# Patient Record
Sex: FEMALE | Race: WHITE | NOT HISPANIC OR LATINO | Employment: OTHER | URBAN - METROPOLITAN AREA
[De-identification: names, ages, dates, MRNs, and addresses within clinical notes are randomized per-mention and may not be internally consistent; named-entity substitution may affect disease eponyms.]

---

## 2017-06-15 ENCOUNTER — TRANSCRIBE ORDERS (OUTPATIENT)
Dept: LAB | Facility: CLINIC | Age: 62
End: 2017-06-15

## 2017-06-15 ENCOUNTER — APPOINTMENT (OUTPATIENT)
Dept: LAB | Facility: CLINIC | Age: 62
End: 2017-06-15
Payer: COMMERCIAL

## 2017-06-15 DIAGNOSIS — R73.03 PREDIABETES: ICD-10-CM

## 2017-06-15 DIAGNOSIS — E78.5 HYPERLIPIDEMIA: ICD-10-CM

## 2017-06-15 LAB
ALBUMIN SERPL BCP-MCNC: 3.8 G/DL (ref 3.5–5)
ALP SERPL-CCNC: 88 U/L (ref 46–116)
ALT SERPL W P-5'-P-CCNC: 22 U/L (ref 12–78)
ANION GAP SERPL CALCULATED.3IONS-SCNC: 4 MMOL/L (ref 4–13)
AST SERPL W P-5'-P-CCNC: 19 U/L (ref 5–45)
BILIRUB SERPL-MCNC: 0.49 MG/DL (ref 0.2–1)
BUN SERPL-MCNC: 11 MG/DL (ref 5–25)
CALCIUM SERPL-MCNC: 9.1 MG/DL (ref 8.3–10.1)
CHLORIDE SERPL-SCNC: 106 MMOL/L (ref 100–108)
CHOLEST SERPL-MCNC: 214 MG/DL (ref 50–200)
CO2 SERPL-SCNC: 27 MMOL/L (ref 21–32)
CREAT SERPL-MCNC: 0.66 MG/DL (ref 0.6–1.3)
EST. AVERAGE GLUCOSE BLD GHB EST-MCNC: 103 MG/DL
GFR SERPL CREATININE-BSD FRML MDRD: >60 ML/MIN/1.73SQ M
GLUCOSE P FAST SERPL-MCNC: 94 MG/DL (ref 65–99)
HBA1C MFR BLD: 5.2 % (ref 4.2–6.3)
HDLC SERPL-MCNC: 99 MG/DL (ref 40–60)
LDLC SERPL CALC-MCNC: 105 MG/DL (ref 0–100)
POTASSIUM SERPL-SCNC: 4.4 MMOL/L (ref 3.5–5.3)
PROT SERPL-MCNC: 6.7 G/DL (ref 6.4–8.2)
SODIUM SERPL-SCNC: 137 MMOL/L (ref 136–145)
TRIGL SERPL-MCNC: 50 MG/DL

## 2017-06-15 PROCEDURE — 80061 LIPID PANEL: CPT

## 2017-06-15 PROCEDURE — 83036 HEMOGLOBIN GLYCOSYLATED A1C: CPT

## 2017-06-15 PROCEDURE — 36415 COLL VENOUS BLD VENIPUNCTURE: CPT

## 2017-06-15 PROCEDURE — 80053 COMPREHEN METABOLIC PANEL: CPT

## 2017-06-21 ENCOUNTER — ALLSCRIPTS OFFICE VISIT (OUTPATIENT)
Dept: OTHER | Facility: OTHER | Age: 62
End: 2017-06-21

## 2017-12-01 DIAGNOSIS — I10 ESSENTIAL (PRIMARY) HYPERTENSION: ICD-10-CM

## 2017-12-01 DIAGNOSIS — Z11.59 ENCOUNTER FOR SCREENING FOR OTHER VIRAL DISEASES: ICD-10-CM

## 2017-12-01 DIAGNOSIS — E78.5 HYPERLIPIDEMIA: ICD-10-CM

## 2017-12-01 DIAGNOSIS — Z12.2 ENCOUNTER FOR SCREENING FOR MALIGNANT NEOPLASM OF RESPIRATORY ORGANS: ICD-10-CM

## 2017-12-01 DIAGNOSIS — R73.03 PREDIABETES: ICD-10-CM

## 2017-12-01 DIAGNOSIS — Z12.31 ENCOUNTER FOR SCREENING MAMMOGRAM FOR MALIGNANT NEOPLASM OF BREAST: ICD-10-CM

## 2017-12-11 ENCOUNTER — ALLSCRIPTS OFFICE VISIT (OUTPATIENT)
Dept: OTHER | Facility: OTHER | Age: 62
End: 2017-12-11

## 2017-12-12 NOTE — PROGRESS NOTES
Assessment    1  Dense breast tissue (793 82) (R92 2)   2  Atrophy of vagina (627 3) (N95 2)   3  Visit for screening mammogram (V76 12) (Z12 31)   4  Encounter for routine gynecological examination (V72 31) (Z01 419)    Plan  Encounter for routine gynecological examination    · Follow-up visit in 1 year Evaluation and Treatment  Follow-up  Status: Hold For -Scheduling  Requested for: 61TVJ4529   Ordered;Encounter for routine gynecological examination; Ordered By: Rosalino Kay Performed:  Due: 21IPY0927  Visit for screening mammogram    · MAMMO SCREENING BILATERAL W 3D & CAD; Status:Hold For - Scheduling; Requestedfor:84Knd3900;    Perform:[facility]; Due:66Ydk7040; Ordered;for screening mammogram; Ordered By:Elizabeth Welsh; Discussion/Summary    1  Yearly exam-Pap smear deferred, self breast awareness reviewed, calcium/vitamin D recommendations discussed, mammogram request given, colonoscopy followup as per specialist, done November 2016 with negative results with follow-up recommended in 5 years time  History of breast cysts/dense breast tissueâpatient had recent mammogram 10/31/16 with no focal findings but extremity dense breast tissue noted  She was counseled about the [facility]'s breast screening algorithm and she could consider automated breast ultrasound or 3-D mammogram  She agrees to 3D mammogram and request was givenHypertension-the patient is followed by Dr Dodie Barney for this issue  Her blood pressure was normal today  Slight vaginal atrophy-the patient does use lubrication without significant issues  she declines any vaginal estrogen or Osphena  She states that her partner also has ED with intermittent response to Viagra  She will call or return if interested in any treatment options  History ofDecreased libido/vulvar cysts-resolvedThe patient will followup in one year or as needed  The patient has the current Goals: Reviewed  The patent has the current Barriers: None     Patient is able to Self-Care  PATIENT EDUCATION RECORD She was given the following educational materials: Calcium/vitamin-D sheet   Chief Complaint    1  Visit For: Preventive General Multisystem Exam    History of Present Illness  HPI: Patient was seen today for yearly exam  Please see assessment plan for details  Review of Systems   Constitutional: No fever, no chills, feels well, no tiredness, no recent weight gain or loss  ENT: no ear ache, no loss of hearing, no nosebleeds or nasal discharge, no sore throat or hoarseness  Cardiovascular: no complaints of slow or fast heart rate, no chest pain, no palpitations, no leg claudication or lower extremity edema  Respiratory: no complaints of shortness of breath, no wheezing, no dyspnea on exertion, no orthopnea or PND  Breasts: no complaints of breast pain, breast lump or nipple discharge  Gastrointestinal: no complaints of abdominal pain, no constipation, no nausea or diarrhea, no vomiting, no bloody stools  Genitourinary: no complaints of dysuria, no incontinence, no pelvic pain, no dysmenorrhea, no vaginal discharge or abnormal vaginal bleeding  Musculoskeletal: no complaints of arthralgia, no myalgia, no joint swelling or stiffness, no limb pain or swelling  Integumentary: no complaints of skin rash or lesion, no itching or dry skin, no skin wounds  Neurological: no complaints of headache, no confusion, no numbness or tingling, no dizziness or fainting  ROS reviewed  Active Problems    1  Allergy to bee sting (V15 06) (Z91 038)   2  Benign essential hypertension (401 1) (I10)   3  Carotid artery plaque (433 10) (I65 29)   4  Cervical cancer screening (V76 2) (Z12 4)   5  Decreased libido (799 81) (R68 82)   6  Dense breast tissue (793 82) (R92 2)   7  Encounter for routine gynecological examination with Papanicolaou smear of cervix (V72 31,V76 2) (Z01 419)   8  Fibrocystic breast disease (610 1) (N60 19)   9  Gallstones (574 20) (K80 20)   10   History of self breast exam   11  Hyperlipidemia (272 4) (E78 5)   12  Need for hepatitis C screening test (V73 89) (Z11 59)   13  Screen for colon cancer (V76 51) (Z12 11)   14  Screening for HPV (human papillomavirus) (V73 81) (Z11 51)   15  Stiffness of shoulder joint, unspecified laterality (719 51) (M25 619)   16  Visit for screening mammogram (V76 12) (Z12 31)    Past Medical History     · History of Acute sinusitis (461 9) (J01 90)   · History of Encounter for screening for malignant neoplasm of cervix (V76 2) (Z12 4)   · History of  2 (V22 2) (Z33 1)   · History of abdominal pain (V13 89) (Y51 925)   · History of carotid artery stenosis (V12 59) (Z86 79)   · History of cyst of breast (V13 3) (Z87 2)   · History of dyspareunia (V13 29)   · History of self breast exam   · History of Left sided abdominal pain (789 09) (R10 9)   · History of Need for influenza vaccination (V04 81) (Z23)   · Personal history of endometriosis (V13 29) (Z87 42)   · History of Pre-diabetes (790 29) (R73 03)   · History of Right ankle pain (719 47) (M25 571)   · History of Upper respiratory infection (465 9) (J06 9)   · History of Visit for pre-operative examination (V72 84) (Q81 823)    The active problems and past medical history were reviewed and updated today  Surgical History     · History of Colposcopy Cervix With Biopsy(S) With Endocervical Curettage   · History of Complete Colonoscopy   · History of Endometrial Biopsy By Suction   · History of Exploratory Laparoscopy   · History of Oral Surgery   · History of Thyroglossal Duct Cyst Excision   · History of Tonsillectomy With Adenoidectomy   · History of Tubal Ligation    The surgical history was reviewed and updated today         Family History  Mother    · Family history of Benign Essential Hypertension  Father    · Family history of Benign Essential Hypertension   · Family history of Skin Cancer (V16 8)  Sister    · Family history of Spina Bifida  Brother    · Family history of Benign Essential Hypertension  Maternal Grandfather    · Family history of colon cancer (V16 0) (Z80 0)  Family History    · Family history of hypercholesterolemia (V18 19) (Z83 42)   · Family history of Hypertension (V17 49)    The family history was reviewed and updated today  Social History     · Always uses seat belt   · Being A Social Drinker   · Caffeine use (V49 89) (F15 90)   · Current Every Day Smoker (305 1)   ·    · No drug use   · [employer]   · Two children  The social history was reviewed and updated today  The social history was reviewed and is unchanged  Current Meds   1  EpiPen 2-Raymond 0 3 MG/0 3ML Injection Solution Auto-injector; INJECT 0 3ML INTRAMUSCULARLY AS DIRECTED; Therapy: 11RUQ3368 to (Last Miguel Diamond)  Requested for: 34Tla1216 Ordered   2  Lisinopril 20 MG Oral Tablet; TAKE 1 TABLET TWICE DAILY; Therapy: 13GMM3804 to (Evaluate:39Qzf8506)  Requested for: 11Xwa1692; Last Rx:29Jiq1122 Ordered   3  Simvastatin 20 MG Oral Tablet; take 1 tablet by mouth every day; Therapy: 36Spl2349 to (Evaluate:16Gew1881)  Requested for: 57VYT9012; Last Rx:72Lwh8081 Ordered    Allergies  1  Percocet TABS    Vitals   Recorded: 81EIE1664 29:21RU   Systolic 906, LUE, Sitting   Diastolic 80, LUE, Sitting   Height 5 ft 2 in   Weight 124 lb 4 oz   BMI Calculated 22 73   BSA Calculated 1 56   LMP POSTMENOPAUSAL       Physical Exam   Constitutional  General appearance: No acute distress, well appearing and well nourished  Neck  Neck: Normal, supple, trachea midline, no masses  Thyroid: Normal, no thyromegaly  Genitourinary  External genitalia: Normal and no lesions appreciated  Vagina: Normal, no lesions or dryness appreciated  -- Mildly atrophic, without erythema or lesions or discharge  Urethra: Normal    Urethral meatus: Normal    Bladder: Normal, soft, non-tender and no prolapse or masses appreciated  Cervix: Normal, no palpable masses   -- Mildly atrophic, without lesions or cervicitis  No Cervical motion tenderness  Uterus: Normal, non-tender, not enlarged, and no palpable masses  -- Mid position, normal size, nontender, without mass  Adnexa/parametria: Normal, non-tender and no fullness or masses appreciated  Anus, perineum, and rectum: Normal sphincter tone, no masses, and no prolapse  Chest  Breasts: Normal and no dimpling or skin changes noted  Abdomen  Abdomen: Normal, non-tender, and no organomegaly noted  Liver and spleen: No hepatomegaly or splenomegaly  Examination for hernias: No hernias appreciated  Lymphatic  Palpation of lymph nodes in neck, axillae, groin and/or other locations: No lymphadenopathy or masses noted  Skin  Skin and subcutaneous tissue: Normal skin turgor and no rashes     Palpation of skin and subcutaneous tissue: Normal    Psychiatric  Orientation to person, place, and time: Normal    Mood and affect: Normal        Future Appointments    Date/Time Provider Specialty Site   33/04/7838 18:50 AM Luana Lr DO Family Medicine [facility]       Signatures   Electronically signed by : CATRINA Veliz ; Dec 11 2017 10:24AM EST                       (Author)

## 2017-12-15 ENCOUNTER — APPOINTMENT (OUTPATIENT)
Dept: LAB | Facility: CLINIC | Age: 62
End: 2017-12-15
Payer: COMMERCIAL

## 2017-12-15 ENCOUNTER — TRANSCRIBE ORDERS (OUTPATIENT)
Dept: LAB | Facility: CLINIC | Age: 62
End: 2017-12-15

## 2017-12-15 DIAGNOSIS — Z11.59 ENCOUNTER FOR SCREENING FOR OTHER VIRAL DISEASES: ICD-10-CM

## 2017-12-15 DIAGNOSIS — I10 ESSENTIAL (PRIMARY) HYPERTENSION: ICD-10-CM

## 2017-12-15 DIAGNOSIS — E78.5 HYPERLIPIDEMIA: ICD-10-CM

## 2017-12-15 DIAGNOSIS — R73.03 PREDIABETES: ICD-10-CM

## 2017-12-15 LAB
25(OH)D3 SERPL-MCNC: 32.3 NG/ML (ref 30–100)
ALBUMIN SERPL BCP-MCNC: 4.3 G/DL (ref 3.5–5)
ALP SERPL-CCNC: 88 U/L (ref 46–116)
ALT SERPL W P-5'-P-CCNC: 25 U/L (ref 12–78)
ANION GAP SERPL CALCULATED.3IONS-SCNC: 10 MMOL/L (ref 4–13)
AST SERPL W P-5'-P-CCNC: 21 U/L (ref 5–45)
BILIRUB SERPL-MCNC: 0.5 MG/DL (ref 0.2–1)
BUN SERPL-MCNC: 12 MG/DL (ref 5–25)
CALCIUM SERPL-MCNC: 9.7 MG/DL (ref 8.3–10.1)
CHLORIDE SERPL-SCNC: 99 MMOL/L (ref 100–108)
CHOLEST SERPL-MCNC: 217 MG/DL (ref 50–200)
CO2 SERPL-SCNC: 23 MMOL/L (ref 21–32)
CREAT SERPL-MCNC: 0.72 MG/DL (ref 0.6–1.3)
GFR SERPL CREATININE-BSD FRML MDRD: 91 ML/MIN/1.73SQ M
GLUCOSE P FAST SERPL-MCNC: 97 MG/DL (ref 65–99)
HDLC SERPL-MCNC: 126 MG/DL (ref 40–60)
LDLC SERPL CALC-MCNC: 81 MG/DL (ref 0–100)
POTASSIUM SERPL-SCNC: 3.7 MMOL/L (ref 3.5–5.3)
PROT SERPL-MCNC: 7.6 G/DL (ref 6.4–8.2)
SODIUM SERPL-SCNC: 132 MMOL/L (ref 136–145)
TRIGL SERPL-MCNC: 49 MG/DL
TSH SERPL DL<=0.05 MIU/L-ACNC: 0.9 UIU/ML (ref 0.36–3.74)

## 2017-12-15 PROCEDURE — 86803 HEPATITIS C AB TEST: CPT

## 2017-12-15 PROCEDURE — 36415 COLL VENOUS BLD VENIPUNCTURE: CPT

## 2017-12-15 PROCEDURE — 80053 COMPREHEN METABOLIC PANEL: CPT

## 2017-12-15 PROCEDURE — 80061 LIPID PANEL: CPT

## 2017-12-15 PROCEDURE — 82306 VITAMIN D 25 HYDROXY: CPT

## 2017-12-15 PROCEDURE — 84443 ASSAY THYROID STIM HORMONE: CPT

## 2017-12-16 LAB — HCV AB SER QL: NORMAL

## 2017-12-19 ENCOUNTER — ALLSCRIPTS OFFICE VISIT (OUTPATIENT)
Dept: OTHER | Facility: OTHER | Age: 62
End: 2017-12-19

## 2017-12-20 NOTE — PROGRESS NOTES
Assessment  1  Benign essential hypertension (401 1) (I10)  2  Hyperlipidemia (272 4) (E78 5)  3  Carotid artery plaque (433 10) (I65 29)  4  Cigarette smoker (305 1) (F17 210)  5  Hyponatremia (276 1) (E87 1)    Plan  Benign essential hypertension, Carotid artery plaque, Hyperlipidemia    · (1) LIPID PANEL, FASTING; Status:Active - Retrospective By Protocol Authorization; Requested RENTERIA:[id number]; Benign essential hypertension, Hyperlipidemia    · (1) BASIC METABOLIC PROFILE; Status:Active - Retrospective By Protocol Authorization; Requested NUA:[id number];   SocHx: Cigarette smoker    · Start: BuPROPion HCl ER (XL) 150 MG Oral Tablet Extended Release 24 Hour; TAKE 1TABLET DAILY    Discussion/Summary    1  Hypertension-stable 2  Hyperlipidemia-LDL and HDL are excellent  3  Carotid artery plaquing-re check duplex December 2018 4  Hyponatremia-recheck BMP in 6 weeks  5  Tobacco cessation discussed  Patient will opt to try Wellbutrin  daily  She had previous side effects to Chantix  She will call in 2-3 weeks with an update and the dose could be raise that that time recheck otherwise 6 months with labs  Flu shot given today  Gyn up-to-date  Recommend she consider the [facility]'s study which involves low-dose CT the chest as a screening tool  The patient was counseled regarding diagnostic results,-- instructions for management,-- risk factor reductions,-- patient and family education,-- impressions,-- risks and benefits of treatment options,-- importance of compliance with treatment  total time of encounter was 25 minutes-- and-- Greater than 50% minutes was spent counseling  The treatment plan was reviewed with the patient/guardian  The patient/guardian understands and agrees with the treatment plan      Chief Complaint  F/U HTN and HL  ksd,cma      History of Present Illness  The patient states her hyperlipidemia has been under good control since the last visit   Comorbid Illnesses: carotid disease,-- tobacco use-- and-- hypertension  Symptoms: The patient is currently asymptomatic  The patient presents for follow-up of essential hypertension  The patient states she has been doing well with her blood pressure control since the last visit  Comorbid Illnesses: Carotid plaquing  Symptoms: The patient is currently asymptomatic  Six-month follow-up on blood pressure and labs  Overall doing well  Working roughly 7 days a week  Interested in trying to quit smoking again  Review of Systems   Constitutional: No fever, no chills, feels well, no tiredness, no recent weight gain or weight loss  Eyes: No complaints of eye pain, no red eyes, no eyesight problems, no discharge, no dry eyes, no itching of eyes  ENT: no complaints of earache, no loss of hearing, no nose bleeds, no nasal discharge, no sore throat, no hoarseness  Cardiovascular: No complaints of slow heart rate, no fast heart rate, no chest pain, no palpitations, no leg claudication, no lower extremity edema  Respiratory: No complaints of shortness of breath, no wheezing, no cough, no SOB on exertion, no orthopnea, no PND  Gastrointestinal: No complaints of abdominal pain, no constipation, no nausea or vomiting, no diarrhea, no bloody stools  Genitourinary: no dysuria  Musculoskeletal: No complaints of arthralgias, no myalgias, no joint swelling or stiffness, no limb pain or swelling  Integumentary: no rashes  Neurological: No complaints of headache, no confusion, no convulsions, no numbness, no dizziness or fainting, no tingling, no limb weakness, no difficulty walking  Psychiatric: as noted in HPI  Hematologic/Lymphatic: no tendency for easy bleeding  Active Problems  1  Allergy to bee sting (V15 06) (Z91 038)  2  Atrophy of vagina (627 3) (N95 2)  3  Benign essential hypertension (401 1) (I10)  4  Carotid artery plaque (433 10) (I65 29)  5  Cervical cancer screening (V76 2) (Z12 4)  6  Decreased libido (799 81) (R68 82)  7   Dense breast tissue (793 82) (R92 2)  8  Encounter for routine gynecological examination (V72 31) (Z01 419)  9  Fibrocystic breast disease (610 1) (N60 19)  10  Gallstones (574 20) (K80 20)  11  Hyperlipidemia (272 4) (E78 5)  12  Need for hepatitis C screening test (V73 89) (Z11 59)  13  Screen for colon cancer (V76 51) (Z12 11)  14  Screening for HPV (human papillomavirus) (V73 81) (Z11 51)  15  Visit for screening mammogram (V76 12) (Z12 31)    Past Medical History  1  History of Acute sinusitis (461 9) (J01 90)  2  History of Encounter for routine gynecological examination with Papanicolaou smear of cervix (V72 31,V76 2) (Z01 419)  3  History of Encounter for screening for malignant neoplasm of cervix (V76 2) (Z12 4)  4  History of  2 (V22 2) (Z33 1)  5  History of abdominal pain (V13 89) (Z87 898)  6  History of carotid artery stenosis (V12 59) (Z86 79)  7  History of cyst of breast (V13 3) (Z87 2)  8  History of dyspareunia (V13 29)  9  History of influenza vaccination (V49 89) (Z92 29)  10  History of self breast exam  11  History of self breast exam  12  History of Left sided abdominal pain (789 09) (R10 9)  13  History of Need for influenza vaccination (V04 81) (Z23)  14  Personal history of endometriosis (V13 29) (Z87 42)  15  History of Pre-diabetes (790 29) (R73 03)  16  History of Right ankle pain (719 47) (M25 571)  17  History of Stiffness of shoulder joint, unspecified laterality (719 51) (M25 619)  18  History of Upper respiratory infection (465 9) (J06 9)  19  History of Visit for pre-operative examination (V72 84) (W31 977)    Surgical History  1  History of Colposcopy Cervix With Biopsy(S) With Endocervical Curettage  2  History of Complete Colonoscopy  3  History of Endometrial Biopsy By Suction  4  History of Exploratory Laparoscopy  5  History of Oral Surgery  6  History of Thyroglossal Duct Cyst Excision  7  History of Tonsillectomy With Adenoidectomy  8   History of Tubal Ligation    Family History  Mother   1  Family history of Benign Essential Hypertension  Father   2  Family history of Benign Essential Hypertension  3  Family history of Skin Cancer (V16 8)  Sister   4  Family history of Spina Bifida  Brother   5  Family history of Benign Essential Hypertension  Maternal Grandfather   6  Family history of colon cancer (V16 0) (Z80 0)  Family History   7  Family history of hypercholesterolemia (V18 19) (Z83 42)  8  Family history of Hypertension (V17 49)    Social History   · Always uses seat belt   · Being A Social Drinker   · Caffeine use (V49 89) (F15 90)   · Cigarette smoker (305 1) (F17 210)   ·    · No drug use   · [employer]   · Two children    Current Meds  1  EpiPen 2-Raymond 0 3 MG/0 3ML Injection Solution Auto-injector; INJECT 0 3ML INTRAMUSCULARLY AS DIRECTED; Therapy: 59NZS5061 to (Last Kevon Alicia)  Requested for: 69Gvs2574 Ordered  2  Lisinopril 20 MG Oral Tablet; TAKE 1 TABLET TWICE DAILY; Therapy: 70TLU4748 to (Evaluate:69Jml7782)  Requested for: 58Ise5490; Last Rx:12Fcs9807 Ordered  3  Simvastatin 20 MG Oral Tablet; take 1 tablet by mouth every day; Therapy: 33Fqe7438 to (Evaluate:89Lty2890)  Requested for: 45DDY8321; Last Rx:61Cqo5980 Ordered    Allergies  1  Percocet TABS    Vitals  Vital Signs    Recorded: 51GIX0707 31:70IZ   Systolic 129   Diastolic 76   Height 5 ft 2 in   Weight 127 lb 2 oz   BMI Calculated 23 25   BSA Calculated 1 58     Physical Exam   Constitutional  General appearance: No acute distress, well appearing and well nourished  Eyes  Pupils and irises: Equal, round and reactive to light  Ears, Nose, Mouth, and Throat  Oropharynx: Normal with no erythema, edema, exudate or lesions  Pulmonary  Respiratory effort: No increased work of breathing or signs of respiratory distress  Auscultation of lungs: Clear to auscultation  Cardiovascular  Auscultation of heart: Normal rate and rhythm, normal S1 and S2, without murmurs     Examination of extremities for edema and/or varicosities: Normal    Carotid pulses: Normal    Abdomen  Abdomen: Non-tender, no masses  Lymphatic  Palpation of lymph nodes in neck: No lymphadenopathy  Musculoskeletal  Gait and station: Normal    Neurologic  Cranial nerves: Cranial nerves 2-12 intact  Psychiatric  Orientation to person, place, and time: Normal    Mood and affect: Normal          Health Management  Screen for colon cancer   COLONOSCOPY; every 5 years; Last 85UET3607; Next Due: 38JPO4141;  Active    Signatures   Electronically signed by : Serenity Clark DO; Dec 19 2730  8:44AM EST                       (Author)    Electronically signed by : Serenity Clark DO; Dec 19 0342  2:06PM EST                       (Author)    Electronically signed by : Serenity Clark DO; Dec 19 2175  2:06PM EST                       (Author)

## 2018-01-12 VITALS
DIASTOLIC BLOOD PRESSURE: 80 MMHG | HEIGHT: 63 IN | WEIGHT: 135.13 LBS | BODY MASS INDEX: 23.94 KG/M2 | SYSTOLIC BLOOD PRESSURE: 116 MMHG

## 2018-01-16 NOTE — PROGRESS NOTES
Chief Complaint  pt here today for the Zostavax vaccine  Active Problems    1  Allergy to bee sting (V15 06) (Z91 038)   2  Benign essential hypertension (401 1) (I10)   3  Decreased libido (799 81) (R68 82)   4  Fibrocystic breast disease (610 1) (N60 19)   5  Hyperlipidemia (272 4) (E78 5)   6  Need for influenza vaccination (V04 81) (Z23)   7  Need for pneumococcal vaccination (V03 82) (Z23)   8  Other screening mammogram (V76 12) (Z12 31)   9  Right ankle pain (719 47) (M25 571)   10  Screen for colon cancer (V76 51) (Z12 11)    Current Meds   1  EpiPen 2-Raymond 0 3 MG/0 3ML Injection Solution Auto-injector; INJECT 0 3ML   INTRAMUSCULARLY AS DIRECTED; Therapy: 85IOP9557 to (Last Reid Brand)  Requested for: 85Ubu1316 Ordered   2  Lisinopril 20 MG Oral Tablet; TAKE 1 TABLET TWICE DAILY; Therapy: 93PFS9000 to (Evaluate:99Tds0076)  Requested for: 27JOX7134; Last   Rx:93Icj0190 Ordered   3  Simvastatin 20 MG Oral Tablet; take 1 tablet by mouth every day; Therapy: 03Lii5051 to (Evaluate:18Nvg1711)  Requested for: 94LVK6871; Last   Rx:84Lhs7310 Ordered    Allergies    1   Percocet TABS    Plan  Need for shingles vaccine    · Zostavax 44993 UNT/0 65ML Subcutaneous Solution Reconstituted    Future Appointments    Date/Time Provider Specialty Site   37/13/1591 68:88 AM Pierre Nunez DO Family Medicine [facility]     Signatures   Electronically signed by : Estee Suh DO; Sep 30 3674 11:54AM EST                       (Author)

## 2018-01-17 NOTE — PROGRESS NOTES
Chief Complaint  pt here today for a flu vaccine  Active Problems    1  Abdominal pain (789 00) (R10 9)   2  Allergy to bee sting (V15 06) (Z91 038)   3  Benign essential hypertension (401 1) (I10)   4  Carotid artery plaque (433 10) (I65 29)   5  Cervical cancer screening (V76 2) (Z12 4)   6  Decreased libido (799 81) (R68 82)   7  Dense breast tissue (793 82) (R92 2)   8  Encounter for routine gynecological examination with Papanicolaou smear of cervix   (V72 31,V76 2) (Z01 419)   9  Fibrocystic breast disease (610 1) (N60 19)   10  Hyperlipidemia (272 4) (E78 5)   11  Left sided abdominal pain (789 09) (R10 9)   12  Pre-diabetes (790 29) (R73 03)   13  Right ankle pain (719 47) (M25 571)   14  Screen for colon cancer (V76 51) (Z12 11)   15  Screening for HPV (human papillomavirus) (V73 81) (Z11 51)   16  Visit for screening mammogram (V76 12) (Z12 31)    Current Meds   1  EpiPen 2-Raymond 0 3 MG/0 3ML Injection Solution Auto-injector; INJECT 0 3ML   INTRAMUSCULARLY AS DIRECTED; Therapy: 68RGU2825 to (Last Kurtis Eisenmenger)  Requested for: 48Rps6929 Ordered   2  Lisinopril 20 MG Oral Tablet; TAKE 1 TABLET TWICE DAILY; Therapy: 82AKX4584 to (Evaluate:15Raq7981)  Requested for: 38WIV2801; Last   Rx:50Err5881 Ordered   3  Simvastatin 20 MG Oral Tablet; take 1 tablet by mouth every day; Therapy: 27Uqz6928 to (Evaluate:31Kdz8719)  Requested for: 33VXI6665; Last   Rx:11Cus2301 Ordered    Allergies    1   Percocet TABS    Plan  Need for influenza vaccination    · Fluzone Quadrivalent Intramuscular Suspension    Future Appointments    Date/Time Provider Specialty Site   94/04/0791 40:38 AM Nabeel Sahni DO Family Medicine [facility]     Signatures   Electronically signed by : Melony De La Fuente DO; Dec 30 4923  3:05PM EST                       (Author)

## 2018-01-22 VITALS
WEIGHT: 127.13 LBS | DIASTOLIC BLOOD PRESSURE: 76 MMHG | BODY MASS INDEX: 23.4 KG/M2 | HEIGHT: 62 IN | SYSTOLIC BLOOD PRESSURE: 130 MMHG

## 2018-01-23 VITALS
BODY MASS INDEX: 22.87 KG/M2 | WEIGHT: 124.25 LBS | HEIGHT: 62 IN | DIASTOLIC BLOOD PRESSURE: 80 MMHG | SYSTOLIC BLOOD PRESSURE: 120 MMHG

## 2018-05-25 RX ORDER — BUPROPION HYDROCHLORIDE 150 MG/1
1 TABLET ORAL DAILY
COMMUNITY
Start: 2017-12-19 | End: 2020-11-12

## 2018-05-25 RX ORDER — LISINOPRIL 20 MG/1
20 TABLET ORAL 2 TIMES DAILY
Refills: 2 | COMMUNITY
Start: 2018-05-07 | End: 2018-12-19 | Stop reason: SDUPTHER

## 2018-05-25 RX ORDER — EPINEPHRINE 0.3 MG/.3ML
0.3 INJECTION SUBCUTANEOUS
COMMUNITY
Start: 2016-09-28

## 2018-05-25 RX ORDER — SIMVASTATIN 20 MG
20 TABLET ORAL DAILY
Refills: 1 | COMMUNITY
Start: 2018-05-12 | End: 2018-11-16 | Stop reason: SDUPTHER

## 2018-05-29 ENCOUNTER — TRANSCRIBE ORDERS (OUTPATIENT)
Dept: FAMILY MEDICINE CLINIC | Facility: CLINIC | Age: 63
End: 2018-05-29

## 2018-05-29 ENCOUNTER — OFFICE VISIT (OUTPATIENT)
Dept: FAMILY MEDICINE CLINIC | Facility: CLINIC | Age: 63
End: 2018-05-29
Payer: COMMERCIAL

## 2018-05-29 VITALS
WEIGHT: 121.4 LBS | SYSTOLIC BLOOD PRESSURE: 140 MMHG | DIASTOLIC BLOOD PRESSURE: 90 MMHG | BODY MASS INDEX: 22.34 KG/M2 | HEIGHT: 62 IN

## 2018-05-29 DIAGNOSIS — L72.9 SUBCUTANEOUS CYST: ICD-10-CM

## 2018-05-29 DIAGNOSIS — Z00.00 HEALTH MAINTENANCE EXAMINATION: Primary | ICD-10-CM

## 2018-05-29 DIAGNOSIS — I65.29 CAROTID ATHEROSCLEROSIS, UNSPECIFIED LATERALITY: ICD-10-CM

## 2018-05-29 DIAGNOSIS — I10 BENIGN ESSENTIAL HYPERTENSION: Primary | ICD-10-CM

## 2018-05-29 DIAGNOSIS — E78.00 PURE HYPERCHOLESTEROLEMIA: ICD-10-CM

## 2018-05-29 PROCEDURE — 3008F BODY MASS INDEX DOCD: CPT | Performed by: FAMILY MEDICINE

## 2018-05-29 PROCEDURE — 99213 OFFICE O/P EST LOW 20 MIN: CPT | Performed by: FAMILY MEDICINE

## 2018-05-29 NOTE — PROGRESS NOTES
Assessment/Plan:  Patient here for complaint of a lump on her back  Was due to come back in a week or 2 for her routine so we did today  Patient's blood pressure in the office is still borderline high issue mention she can get better blood pressures when she is at home  Reviewed that new goal would be 130/80 and below  Will order updated lipid profile for patient we will send her to General surgery for evaluation of a probable subcutaneous cyst   Carotids will be screened annually  Diagnoses and all orders for this visit:    Benign essential hypertension  -     Comprehensive metabolic panel; Future  -     TSH, 3rd generation; Future    Pure hypercholesterolemia  -     Lipid panel; Future    Subcutaneous cyst    Carotid atherosclerosis, unspecified laterality    Other orders  -     simvastatin (ZOCOR) 20 mg tablet; Take 20 mg by mouth daily  -     lisinopril (ZESTRIL) 20 mg tablet; Take 20 mg by mouth 2 (two) times a day  -     EPINEPHrine (EPIPEN 2-MAYCOL) 0 3 mg/0 3 mL SOAJ; Inject 0 3 mL as directed  -     buPROPion (WELLBUTRIN XL) 150 mg 24 hr tablet; Take 1 tablet by mouth daily        There are no Patient Instructions on file for this visit  Subjective:        Patient ID: Patricia Dalton is a 63 y o  female  Chief Complaint   Patient presents with    Mass     lump on back, noticed it last week, under the skin, pt states that it moves when she touches it, pt states there is no pain       Patient here for evaluation of lump under skin  Notes his on her right back area  Thinks has been there about a week but likely longer  It is nontender  Otherwise feeling well  The following portions of the patient's history were reviewed and updated as appropriate: past medical history, past surgical history and problem list       Review of Systems   Constitutional: Negative  Respiratory: Negative  Cardiovascular: Negative      Skin:        Lump right upper back         Objective:  /90 (BP Location: Left arm, Patient Position: Sitting, Cuff Size: Standard)   Ht 5' 2" (1 575 m)   Wt 55 1 kg (121 lb 6 4 oz)   BMI 22 20 kg/m²        Physical Exam   Constitutional: She is oriented to person, place, and time  She appears well-nourished  No distress  Cardiovascular: Normal rate, regular rhythm and normal heart sounds  Pulmonary/Chest: Breath sounds normal    Neurological: She is alert and oriented to person, place, and time  Skin:   Firm movable lump approximately 1 3 centimeters right back below the scapula  Consistent with subcutaneous cyst like lesion

## 2018-06-01 DIAGNOSIS — I10 ESSENTIAL (PRIMARY) HYPERTENSION: ICD-10-CM

## 2018-06-01 DIAGNOSIS — E78.5 HYPERLIPIDEMIA: ICD-10-CM

## 2018-06-01 DIAGNOSIS — I65.29 OCCLUSION AND STENOSIS OF UNSPECIFIED CAROTID ARTERY: ICD-10-CM

## 2018-09-17 ENCOUNTER — TELEPHONE (OUTPATIENT)
Dept: FAMILY MEDICINE CLINIC | Facility: CLINIC | Age: 63
End: 2018-09-17

## 2018-09-17 NOTE — TELEPHONE ENCOUNTER
Naty called in stating that she has a new grandson that was born 8/31/2018, the baby will be coming to meet her for the 1st time tomorrow evening 9/18/18, naty would like to know if she can come in tomorrow for a TDAP   # [phone]

## 2018-09-18 ENCOUNTER — CLINICAL SUPPORT (OUTPATIENT)
Dept: FAMILY MEDICINE CLINIC | Facility: CLINIC | Age: 63
End: 2018-09-18
Payer: COMMERCIAL

## 2018-09-18 DIAGNOSIS — Z23 NEED FOR TDAP VACCINATION: Primary | ICD-10-CM

## 2018-09-18 PROCEDURE — 90715 TDAP VACCINE 7 YRS/> IM: CPT | Performed by: FAMILY MEDICINE

## 2018-09-18 PROCEDURE — 90471 IMMUNIZATION ADMIN: CPT | Performed by: FAMILY MEDICINE

## 2018-11-16 DIAGNOSIS — E78.00 PURE HYPERCHOLESTEROLEMIA: ICD-10-CM

## 2018-11-16 DIAGNOSIS — I10 ESSENTIAL HYPERTENSION: Primary | ICD-10-CM

## 2018-11-16 RX ORDER — SIMVASTATIN 20 MG
TABLET ORAL
Qty: 90 TABLET | Refills: 1 | Status: SHIPPED | OUTPATIENT
Start: 2018-11-16 | End: 2019-11-04 | Stop reason: SDUPTHER

## 2018-11-29 ENCOUNTER — APPOINTMENT (OUTPATIENT)
Dept: LAB | Facility: CLINIC | Age: 63
End: 2018-11-29
Payer: COMMERCIAL

## 2018-11-29 DIAGNOSIS — E78.00 PURE HYPERCHOLESTEROLEMIA: ICD-10-CM

## 2018-11-29 DIAGNOSIS — I10 BENIGN ESSENTIAL HYPERTENSION: ICD-10-CM

## 2018-11-29 LAB
ALBUMIN SERPL BCP-MCNC: 4.2 G/DL (ref 3.5–5)
ALP SERPL-CCNC: 86 U/L (ref 46–116)
ALT SERPL W P-5'-P-CCNC: 24 U/L (ref 12–78)
ANION GAP SERPL CALCULATED.3IONS-SCNC: 7 MMOL/L (ref 4–13)
AST SERPL W P-5'-P-CCNC: 17 U/L (ref 5–45)
BILIRUB SERPL-MCNC: 0.76 MG/DL (ref 0.2–1)
BUN SERPL-MCNC: 10 MG/DL (ref 5–25)
CALCIUM SERPL-MCNC: 9.2 MG/DL (ref 8.3–10.1)
CHLORIDE SERPL-SCNC: 105 MMOL/L (ref 100–108)
CHOLEST SERPL-MCNC: 226 MG/DL (ref 50–200)
CO2 SERPL-SCNC: 26 MMOL/L (ref 21–32)
CREAT SERPL-MCNC: 0.69 MG/DL (ref 0.6–1.3)
GFR SERPL CREATININE-BSD FRML MDRD: 94 ML/MIN/1.73SQ M
GLUCOSE P FAST SERPL-MCNC: 104 MG/DL (ref 65–99)
HDLC SERPL-MCNC: 109 MG/DL (ref 40–60)
LDLC SERPL CALC-MCNC: 99 MG/DL (ref 0–100)
NONHDLC SERPL-MCNC: 117 MG/DL
POTASSIUM SERPL-SCNC: 4.2 MMOL/L (ref 3.5–5.3)
PROT SERPL-MCNC: 7.3 G/DL (ref 6.4–8.2)
SODIUM SERPL-SCNC: 138 MMOL/L (ref 136–145)
TRIGL SERPL-MCNC: 88 MG/DL
TSH SERPL DL<=0.05 MIU/L-ACNC: 1.02 UIU/ML (ref 0.36–3.74)

## 2018-11-29 PROCEDURE — 36415 COLL VENOUS BLD VENIPUNCTURE: CPT

## 2018-11-29 PROCEDURE — 80053 COMPREHEN METABOLIC PANEL: CPT

## 2018-11-29 PROCEDURE — 80061 LIPID PANEL: CPT

## 2018-11-29 PROCEDURE — 84443 ASSAY THYROID STIM HORMONE: CPT

## 2018-11-30 ENCOUNTER — OFFICE VISIT (OUTPATIENT)
Dept: FAMILY MEDICINE CLINIC | Facility: CLINIC | Age: 63
End: 2018-11-30
Payer: COMMERCIAL

## 2018-11-30 VITALS
HEIGHT: 63 IN | DIASTOLIC BLOOD PRESSURE: 82 MMHG | BODY MASS INDEX: 21.23 KG/M2 | WEIGHT: 119.8 LBS | SYSTOLIC BLOOD PRESSURE: 132 MMHG

## 2018-11-30 DIAGNOSIS — R73.01 ELEVATED FASTING BLOOD SUGAR: ICD-10-CM

## 2018-11-30 DIAGNOSIS — E78.00 PURE HYPERCHOLESTEROLEMIA: ICD-10-CM

## 2018-11-30 DIAGNOSIS — I10 BENIGN ESSENTIAL HYPERTENSION: Primary | ICD-10-CM

## 2018-11-30 DIAGNOSIS — Z23 NEED FOR INFLUENZA VACCINATION: ICD-10-CM

## 2018-11-30 DIAGNOSIS — Z72.0 TOBACCO ABUSE: ICD-10-CM

## 2018-11-30 DIAGNOSIS — I65.23 ATHEROSCLEROSIS OF BOTH CAROTID ARTERIES: ICD-10-CM

## 2018-11-30 PROCEDURE — 3079F DIAST BP 80-89 MM HG: CPT | Performed by: FAMILY MEDICINE

## 2018-11-30 PROCEDURE — 3008F BODY MASS INDEX DOCD: CPT | Performed by: FAMILY MEDICINE

## 2018-11-30 PROCEDURE — 99214 OFFICE O/P EST MOD 30 MIN: CPT | Performed by: FAMILY MEDICINE

## 2018-11-30 PROCEDURE — 90471 IMMUNIZATION ADMIN: CPT

## 2018-11-30 PROCEDURE — 90682 RIV4 VACC RECOMBINANT DNA IM: CPT

## 2018-11-30 PROCEDURE — 3075F SYST BP GE 130 - 139MM HG: CPT | Performed by: FAMILY MEDICINE

## 2018-11-30 RX ORDER — NICOTINE 21 MG/24HR
1 PATCH, TRANSDERMAL 24 HOURS TRANSDERMAL EVERY 24 HOURS
Qty: 28 PATCH | Refills: 0 | Status: SHIPPED | OUTPATIENT
Start: 2018-11-30 | End: 2020-11-12

## 2018-12-02 NOTE — PROGRESS NOTES
Assessment/Plan:    Patient's blood pressure stable  Despite patient's total cholesterol being slightly elevated her HDL and LDL goal   A1c is been reviewed  Risk factor modification  We continued patches written for smoking cessation  Under lot of stress regarding 's health issues  Recheck 6 months with labs  Patient will be due for updated carotid duplex  Flu shot today  Diagnoses and all orders for this visit:    Benign essential hypertension    Pure hypercholesterolemia  -     Comprehensive metabolic panel; Future  -     Lipid panel; Future    Elevated fasting blood sugar  -     Hemoglobin A1C; Future    Tobacco abuse  -     nicotine (NICODERM CQ) 14 mg/24hr TD 24 hr patch; Place 1 patch on the skin every 24 hours  -     nicotine (NICODERM CQ) 7 mg/24hr TD 24 hr patch; Place 1 patch on the skin every 24 hours    Atherosclerosis of both carotid arteries  -     VAS carotid complete study; Future    Need for influenza vaccination  -     PREFERRED: influenza vaccine, 5522-3546, quadrivalent, recombinant, PF, 0 5 mL, for patients 18 yr+ (FLUBL[facility])        1  Benign essential hypertension     2  Pure hypercholesterolemia  Comprehensive metabolic panel    Lipid panel   3  Elevated fasting blood sugar  Hemoglobin A1C   4  Tobacco abuse  nicotine (NICODERM CQ) 14 mg/24hr TD 24 hr patch    nicotine (NICODERM CQ) 7 mg/24hr TD 24 hr patch   5  Atherosclerosis of both carotid arteries  VAS carotid complete study   6  Need for influenza vaccination  PREFERRED: influenza vaccine, 1377-2370, quadrivalent, recombinant, PF, 0 5 mL, for patients 18 yr+ (FLUBLOK)       Subjective:        Patient ID: Daly Man is a 66 y o  female  Chief Complaint   Patient presents with    Follow-up     6 months, HTN HL;    Flu Vaccine     discuss pneumo vaccine       Patient for 6 month check regarding blood pressure and labs  Under lot of stress   recently ill  Family members also undergoing issues    Work is going very well  The following portions of the patient's history were reviewed and updated as appropriate: past medical history, past surgical history and problem list       Review of Systems   Constitutional: Negative for appetite change, fatigue, fever and unexpected weight change  HENT: Negative for congestion, ear pain, postnasal drip, rhinorrhea, sinus pain, sinus pressure and sore throat  Eyes: Negative for redness and visual disturbance  Respiratory: Negative for chest tightness and shortness of breath  Cardiovascular: Negative for chest pain, palpitations and leg swelling  Gastrointestinal: Negative for abdominal distention, abdominal pain, diarrhea and nausea  Endocrine: Negative for cold intolerance and heat intolerance  Genitourinary: Negative for dysuria and hematuria  Musculoskeletal: Negative for arthralgias, gait problem and myalgias  Skin: Negative for pallor and rash  Neurological: Negative for dizziness and headaches  Psychiatric/Behavioral: Negative for behavioral problems  The patient is not nervous/anxious  Objective:  /82 (BP Location: Left arm, Patient Position: Sitting, Cuff Size: Standard)   Ht 5' 3" (1 6 m)   Wt 54 3 kg (119 lb 12 8 oz)   BMI 21 22 kg/m²        Physical Exam   Constitutional: She is oriented to person, place, and time  She appears well-nourished  No distress  HENT:   Head: Normocephalic and atraumatic  Right Ear: Tympanic membrane and external ear normal    Left Ear: Tympanic membrane and external ear normal    Nose: Nose normal    Mouth/Throat: Oropharynx is clear and moist    Eyes: Pupils are equal, round, and reactive to light  Conjunctivae and EOM are normal  No scleral icterus  Neck: Neck supple  No thyromegaly present  Cardiovascular: Normal rate, regular rhythm and intact distal pulses  No murmur heard  Pulses:       Carotid pulses are 0 on the right side, and 0 on the left side    Pulmonary/Chest: Effort normal and breath sounds normal  She has no wheezes  Abdominal: Soft  Bowel sounds are normal  She exhibits no distension and no mass  There is no tenderness  Musculoskeletal: Normal range of motion  She exhibits no edema  Lymphadenopathy:     She has no cervical adenopathy  Neurological: She is alert and oriented to person, place, and time  No cranial nerve deficit  Coordination normal    Skin: Skin is warm  No pallor  Psychiatric: She has a normal mood and affect  Her behavior is normal  Thought content normal    Nursing note and vitals reviewed

## 2018-12-19 DIAGNOSIS — I10 ESSENTIAL HYPERTENSION: Primary | ICD-10-CM

## 2018-12-20 RX ORDER — LISINOPRIL 20 MG/1
20 TABLET ORAL 2 TIMES DAILY
Qty: 180 TABLET | Refills: 1 | Status: SHIPPED | OUTPATIENT
Start: 2018-12-20 | End: 2020-02-07 | Stop reason: SDUPTHER

## 2019-11-04 DIAGNOSIS — E78.00 PURE HYPERCHOLESTEROLEMIA: ICD-10-CM

## 2019-11-04 RX ORDER — SIMVASTATIN 20 MG
TABLET ORAL
Qty: 90 TABLET | Refills: 0 | Status: SHIPPED | OUTPATIENT
Start: 2019-11-04 | End: 2020-02-19 | Stop reason: SDUPTHER

## 2020-02-07 DIAGNOSIS — I10 ESSENTIAL HYPERTENSION: ICD-10-CM

## 2020-02-07 RX ORDER — LISINOPRIL 20 MG/1
20 TABLET ORAL 2 TIMES DAILY
Qty: 180 TABLET | Refills: 0 | Status: SHIPPED | OUTPATIENT
Start: 2020-02-07 | End: 2020-06-20 | Stop reason: SDUPTHER

## 2020-02-19 DIAGNOSIS — E78.00 PURE HYPERCHOLESTEROLEMIA: ICD-10-CM

## 2020-02-20 RX ORDER — SIMVASTATIN 20 MG
20 TABLET ORAL DAILY
Qty: 90 TABLET | Refills: 0 | Status: SHIPPED | OUTPATIENT
Start: 2020-02-20 | End: 2020-06-20 | Stop reason: SDUPTHER

## 2020-06-20 DIAGNOSIS — E78.00 PURE HYPERCHOLESTEROLEMIA: ICD-10-CM

## 2020-06-20 DIAGNOSIS — I10 ESSENTIAL HYPERTENSION: ICD-10-CM

## 2020-06-22 RX ORDER — SIMVASTATIN 20 MG
20 TABLET ORAL DAILY
Qty: 90 TABLET | Refills: 0 | Status: SHIPPED | OUTPATIENT
Start: 2020-06-22 | End: 2020-11-09 | Stop reason: SDUPTHER

## 2020-06-22 RX ORDER — LISINOPRIL 20 MG/1
20 TABLET ORAL 2 TIMES DAILY
Qty: 180 TABLET | Refills: 0 | Status: SHIPPED | OUTPATIENT
Start: 2020-06-22 | End: 2020-11-09 | Stop reason: SDUPTHER

## 2020-11-09 DIAGNOSIS — E78.00 PURE HYPERCHOLESTEROLEMIA: ICD-10-CM

## 2020-11-09 DIAGNOSIS — I10 ESSENTIAL HYPERTENSION: ICD-10-CM

## 2020-11-09 RX ORDER — SIMVASTATIN 20 MG
20 TABLET ORAL DAILY
Qty: 90 TABLET | Refills: 0 | Status: SHIPPED | OUTPATIENT
Start: 2020-11-09 | End: 2020-11-12

## 2020-11-09 RX ORDER — LISINOPRIL 20 MG/1
20 TABLET ORAL 2 TIMES DAILY
Qty: 180 TABLET | Refills: 0 | Status: SHIPPED | OUTPATIENT
Start: 2020-11-09 | End: 2021-05-16 | Stop reason: SDUPTHER

## 2020-11-12 ENCOUNTER — OFFICE VISIT (OUTPATIENT)
Dept: FAMILY MEDICINE CLINIC | Facility: CLINIC | Age: 65
End: 2020-11-12

## 2020-11-12 VITALS
BODY MASS INDEX: 22.32 KG/M2 | TEMPERATURE: 97.4 F | SYSTOLIC BLOOD PRESSURE: 160 MMHG | WEIGHT: 126 LBS | HEART RATE: 89 BPM | HEIGHT: 63 IN | DIASTOLIC BLOOD PRESSURE: 98 MMHG | OXYGEN SATURATION: 99 %

## 2020-11-12 DIAGNOSIS — I10 ESSENTIAL HYPERTENSION: ICD-10-CM

## 2020-11-12 DIAGNOSIS — E78.00 PURE HYPERCHOLESTEROLEMIA: ICD-10-CM

## 2020-11-12 DIAGNOSIS — Z23 ENCOUNTER FOR IMMUNIZATION: Primary | ICD-10-CM

## 2020-11-12 DIAGNOSIS — Z12.31 ENCOUNTER FOR SCREENING MAMMOGRAM FOR MALIGNANT NEOPLASM OF BREAST: ICD-10-CM

## 2020-11-12 DIAGNOSIS — F17.200 TOBACCO DEPENDENCE: ICD-10-CM

## 2020-11-12 DIAGNOSIS — I65.23 ATHEROSCLEROSIS OF BOTH CAROTID ARTERIES: ICD-10-CM

## 2020-11-12 PROCEDURE — 99213 OFFICE O/P EST LOW 20 MIN: CPT | Performed by: FAMILY MEDICINE

## 2020-11-12 RX ORDER — AMLODIPINE BESYLATE 5 MG/1
5 TABLET ORAL DAILY
Qty: 90 TABLET | Refills: 1 | Status: SHIPPED | OUTPATIENT
Start: 2020-11-12 | End: 2021-05-13

## 2020-11-18 PROCEDURE — 90471 IMMUNIZATION ADMIN: CPT

## 2020-11-18 PROCEDURE — 90682 RIV4 VACC RECOMBINANT DNA IM: CPT

## 2020-12-19 ENCOUNTER — TELEPHONE (OUTPATIENT)
Dept: OTHER | Facility: OTHER | Age: 65
End: 2020-12-19

## 2020-12-19 DIAGNOSIS — Z20.822 EXPOSURE TO COVID-19 VIRUS: Primary | ICD-10-CM

## 2020-12-22 DIAGNOSIS — Z20.822 EXPOSURE TO COVID-19 VIRUS: ICD-10-CM

## 2020-12-22 PROCEDURE — U0003 INFECTIOUS AGENT DETECTION BY NUCLEIC ACID (DNA OR RNA); SEVERE ACUTE RESPIRATORY SYNDROME CORONAVIRUS 2 (SARS-COV-2) (CORONAVIRUS DISEASE [COVID-19]), AMPLIFIED PROBE TECHNIQUE, MAKING USE OF HIGH THROUGHPUT TECHNOLOGIES AS DESCRIBED BY CMS-2020-01-R: HCPCS | Performed by: INTERNAL MEDICINE

## 2020-12-23 LAB — SARS-COV-2 RNA SPEC QL NAA+PROBE: NOT DETECTED

## 2021-03-10 DIAGNOSIS — Z23 ENCOUNTER FOR IMMUNIZATION: ICD-10-CM

## 2021-03-17 ENCOUNTER — IMMUNIZATIONS (OUTPATIENT)
Dept: FAMILY MEDICINE CLINIC | Facility: HOSPITAL | Age: 66
End: 2021-03-17

## 2021-03-17 DIAGNOSIS — Z23 ENCOUNTER FOR IMMUNIZATION: Primary | ICD-10-CM

## 2021-03-17 PROCEDURE — 0001A SARS-COV-2 / COVID-19 MRNA VACCINE (PFIZER-BIONTECH) 30 MCG: CPT

## 2021-03-17 PROCEDURE — 91300 SARS-COV-2 / COVID-19 MRNA VACCINE (PFIZER-BIONTECH) 30 MCG: CPT

## 2021-04-07 ENCOUNTER — IMMUNIZATIONS (OUTPATIENT)
Dept: FAMILY MEDICINE CLINIC | Facility: HOSPITAL | Age: 66
End: 2021-04-07

## 2021-04-07 DIAGNOSIS — Z23 ENCOUNTER FOR IMMUNIZATION: Primary | ICD-10-CM

## 2021-04-07 PROCEDURE — 91300 SARS-COV-2 / COVID-19 MRNA VACCINE (PFIZER-BIONTECH) 30 MCG: CPT

## 2021-04-07 PROCEDURE — 0002A SARS-COV-2 / COVID-19 MRNA VACCINE (PFIZER-BIONTECH) 30 MCG: CPT

## 2021-05-13 ENCOUNTER — OFFICE VISIT (OUTPATIENT)
Dept: FAMILY MEDICINE CLINIC | Facility: CLINIC | Age: 66
End: 2021-05-13

## 2021-05-13 VITALS
RESPIRATION RATE: 16 BRPM | HEART RATE: 60 BPM | BODY MASS INDEX: 23.14 KG/M2 | DIASTOLIC BLOOD PRESSURE: 80 MMHG | OXYGEN SATURATION: 98 % | HEIGHT: 63 IN | WEIGHT: 130.6 LBS | TEMPERATURE: 97.6 F | SYSTOLIC BLOOD PRESSURE: 138 MMHG

## 2021-05-13 DIAGNOSIS — I65.23 ATHEROSCLEROSIS OF BOTH CAROTID ARTERIES: ICD-10-CM

## 2021-05-13 DIAGNOSIS — E78.00 PURE HYPERCHOLESTEROLEMIA: ICD-10-CM

## 2021-05-13 DIAGNOSIS — Z72.0 TOBACCO USE: ICD-10-CM

## 2021-05-13 DIAGNOSIS — I10 BENIGN ESSENTIAL HYPERTENSION: Primary | ICD-10-CM

## 2021-05-13 DIAGNOSIS — R01.1 SYSTOLIC EJECTION MURMUR: ICD-10-CM

## 2021-05-13 PROCEDURE — 99213 OFFICE O/P EST LOW 20 MIN: CPT | Performed by: FAMILY MEDICINE

## 2021-05-13 RX ORDER — MELATONIN
1000 DAILY
COMMUNITY

## 2021-05-13 RX ORDER — MULTIVIT WITH MINERALS/LUTEIN
1000 TABLET ORAL DAILY
COMMUNITY

## 2021-05-13 RX ORDER — ZINC GLUCONATE 50 MG
50 TABLET ORAL DAILY
COMMUNITY

## 2021-05-13 NOTE — PROGRESS NOTES
Assessment/Plan:      Blood pressure fair  Patient will continue just on lisinopril and monitor home blood pressures  Amlodipine taken off of list   Unfortunately patient is a  and has no health care insurance  Is trying to get her labs through [facility] price check her  Ideally patient is due for :  1   Mammography  2  Updated carotid duplex  3   Echocardiogram re NEAL  4    Start of 5 mg rosuvastatin  5    Colonoscopy  6     Due for Pneumovax 23    Will continue to work with patient as best as possible  Recheck 6 months  She did get her COVID vaccine  Diagnoses and all orders for this visit:    Benign essential hypertension    Atherosclerosis of both carotid arteries    Pure hypercholesterolemia    Systolic ejection murmur    Tobacco use    Other orders  -     cholecalciferol (VITAMIN D3) 1,000 units tablet; Take 1,000 Units by mouth daily  -     Ascorbic Acid (vitamin C) 1000 MG tablet; Take 1,000 mg by mouth daily  -     zinc gluconate 50 mg tablet; Take 50 mg by mouth daily        1  Benign essential hypertension     2  Atherosclerosis of both carotid arteries     3  Pure hypercholesterolemia     4  Systolic ejection murmur     5  Tobacco use         Subjective:        Patient ID: Michelle Rutledge is a 63 y o  female  Chief Complaint   Patient presents with    Follow-up    Hypertension         Routine recheck for blood pressure regarding this 63year-old patient  Unfortunately no healthcare insurance  For got to get lab work done through price check her  Due for number of studies  States she otherwise feels okay  Did get her COVID vaccine  Unfortunately still smoking  The following portions of the patient's history were reviewed and updated as appropriate: past medical history, past surgical history and problem list       Review of Systems   Constitutional: Negative for fatigue  Eyes: Negative for visual disturbance     Respiratory: Negative for cough and shortness of breath  Cardiovascular: Negative for chest pain, palpitations and leg swelling  Gastrointestinal: Negative for abdominal pain  Neurological: Negative for dizziness and headaches  Psychiatric/Behavioral: The patient is not nervous/anxious  Objective:  /80 (BP Location: Left arm, Patient Position: Sitting, Cuff Size: Standard)   Pulse 60   Temp 97 6 °F (36 4 °C) (Tympanic)   Resp 16   Ht 5' 3" (1 6 m)   Wt 59 2 kg (130 lb 9 6 oz)   SpO2 98%   BMI 23 13 kg/m²        Physical Exam  Vitals signs and nursing note reviewed  Constitutional:       General: She is not in acute distress  HENT:      Head: Normocephalic  Eyes:      General: No scleral icterus  Pupils: Pupils are equal, round, and reactive to light  Cardiovascular:      Heart sounds: Normal heart sounds  No murmur  Pulmonary:      Breath sounds: Normal breath sounds  Musculoskeletal:      Right lower leg: No edema  Left lower leg: No edema  Lymphadenopathy:      Cervical: No cervical adenopathy  Skin:     Coloration: Skin is not pale  Neurological:      Mental Status: She is alert and oriented to person, place, and time  Psychiatric:         Behavior: Behavior normal          Thought Content:  Thought content normal

## 2021-05-16 DIAGNOSIS — I10 ESSENTIAL HYPERTENSION: ICD-10-CM

## 2021-05-17 RX ORDER — LISINOPRIL 20 MG/1
20 TABLET ORAL 2 TIMES DAILY
Qty: 180 TABLET | Refills: 1 | Status: SHIPPED | OUTPATIENT
Start: 2021-05-17 | End: 2021-11-16 | Stop reason: SDUPTHER

## 2021-11-16 ENCOUNTER — OFFICE VISIT (OUTPATIENT)
Dept: FAMILY MEDICINE CLINIC | Facility: CLINIC | Age: 66
End: 2021-11-16

## 2021-11-16 VITALS
SYSTOLIC BLOOD PRESSURE: 152 MMHG | WEIGHT: 124.4 LBS | DIASTOLIC BLOOD PRESSURE: 90 MMHG | BODY MASS INDEX: 22.89 KG/M2 | TEMPERATURE: 97.7 F | HEART RATE: 97 BPM | OXYGEN SATURATION: 97 % | RESPIRATION RATE: 16 BRPM | HEIGHT: 62 IN

## 2021-11-16 DIAGNOSIS — Z12.12 SCREENING FOR COLORECTAL CANCER: ICD-10-CM

## 2021-11-16 DIAGNOSIS — I10 ESSENTIAL HYPERTENSION: Primary | ICD-10-CM

## 2021-11-16 DIAGNOSIS — Z12.31 BREAST CANCER SCREENING BY MAMMOGRAM: ICD-10-CM

## 2021-11-16 DIAGNOSIS — Z12.11 SCREENING FOR COLORECTAL CANCER: ICD-10-CM

## 2021-11-16 DIAGNOSIS — E78.00 PURE HYPERCHOLESTEROLEMIA: ICD-10-CM

## 2021-11-16 PROCEDURE — 99213 OFFICE O/P EST LOW 20 MIN: CPT | Performed by: FAMILY MEDICINE

## 2021-11-16 RX ORDER — LISINOPRIL 20 MG/1
20 TABLET ORAL 2 TIMES DAILY
Qty: 180 TABLET | Refills: 1 | Status: SHIPPED | OUTPATIENT
Start: 2021-11-16

## 2022-05-23 ENCOUNTER — APPOINTMENT (OUTPATIENT)
Dept: LAB | Facility: HOSPITAL | Age: 67
End: 2022-05-23
Payer: COMMERCIAL

## 2022-05-23 DIAGNOSIS — E78.00 PURE HYPERCHOLESTEROLEMIA: ICD-10-CM

## 2022-05-23 LAB
ALBUMIN SERPL BCP-MCNC: 4 G/DL (ref 3.5–5)
ALP SERPL-CCNC: 106 U/L (ref 46–116)
ALT SERPL W P-5'-P-CCNC: 24 U/L (ref 12–78)
ANION GAP SERPL CALCULATED.3IONS-SCNC: 8 MMOL/L (ref 4–13)
AST SERPL W P-5'-P-CCNC: 18 U/L (ref 5–45)
BILIRUB SERPL-MCNC: 0.4 MG/DL (ref 0.2–1)
BUN SERPL-MCNC: 11 MG/DL (ref 5–25)
CALCIUM SERPL-MCNC: 9 MG/DL (ref 8.3–10.1)
CHLORIDE SERPL-SCNC: 101 MMOL/L (ref 100–108)
CHOLEST SERPL-MCNC: 296 MG/DL
CO2 SERPL-SCNC: 26 MMOL/L (ref 21–32)
CREAT SERPL-MCNC: 0.67 MG/DL (ref 0.6–1.3)
GFR SERPL CREATININE-BSD FRML MDRD: 93 ML/MIN/1.73SQ M
GLUCOSE SERPL-MCNC: 96 MG/DL (ref 65–140)
HDLC SERPL-MCNC: 100 MG/DL
LDLC SERPL CALC-MCNC: 189 MG/DL (ref 0–100)
NONHDLC SERPL-MCNC: 196 MG/DL
POTASSIUM SERPL-SCNC: 4.7 MMOL/L (ref 3.5–5.3)
PROT SERPL-MCNC: 7.1 G/DL (ref 6.4–8.2)
SODIUM SERPL-SCNC: 135 MMOL/L (ref 136–145)
TRIGL SERPL-MCNC: 37 MG/DL

## 2022-05-23 PROCEDURE — 36415 COLL VENOUS BLD VENIPUNCTURE: CPT

## 2022-05-23 PROCEDURE — 80053 COMPREHEN METABOLIC PANEL: CPT

## 2022-05-23 PROCEDURE — 80061 LIPID PANEL: CPT

## 2022-05-25 ENCOUNTER — OFFICE VISIT (OUTPATIENT)
Dept: FAMILY MEDICINE CLINIC | Facility: CLINIC | Age: 67
End: 2022-05-25

## 2022-05-25 VITALS
DIASTOLIC BLOOD PRESSURE: 82 MMHG | HEART RATE: 96 BPM | BODY MASS INDEX: 24.25 KG/M2 | SYSTOLIC BLOOD PRESSURE: 150 MMHG | TEMPERATURE: 97.6 F | WEIGHT: 131.8 LBS | OXYGEN SATURATION: 96 % | RESPIRATION RATE: 16 BRPM | HEIGHT: 62 IN

## 2022-05-25 DIAGNOSIS — I10 BENIGN ESSENTIAL HYPERTENSION: Primary | ICD-10-CM

## 2022-05-25 DIAGNOSIS — E78.00 PURE HYPERCHOLESTEROLEMIA: ICD-10-CM

## 2022-05-25 PROCEDURE — 99213 OFFICE O/P EST LOW 20 MIN: CPT | Performed by: FAMILY MEDICINE

## 2022-05-25 RX ORDER — ROSUVASTATIN CALCIUM 10 MG/1
10 TABLET, COATED ORAL DAILY
Qty: 30 TABLET | Refills: 5 | Status: SHIPPED | OUTPATIENT
Start: 2022-05-25

## 2022-05-27 NOTE — PROGRESS NOTES
Assessment/Plan:    Blood pressure stable  Agrees to have coronary calcium score due to ASCVD risk of 15 9%  Unfortunately still self pay therefore if necessary will push statin to keep LDL below 100  Would hold off on nuclear stress testing due to expense  Await test results  Otherwise recheck 6 months pending results of coronary calcium score     Diagnoses and all orders for this visit:    Pure hypercholesterolemia  -     CT coronary calcium score; Future  -     rosuvastatin (CRESTOR) 10 MG tablet; Take 1 tablet (10 mg total) by mouth daily  -     Lipid panel; Future  -     Comprehensive metabolic panel; Future  -     CK; Future    Encounter for immunization    Encounter for screening mammogram for breast cancer    Screening for cervical cancer        1  Pure hypercholesterolemia  CT coronary calcium score    rosuvastatin (CRESTOR) 10 MG tablet    Lipid panel    Comprehensive metabolic panel    CK   2  Encounter for immunization     3  Encounter for screening mammogram for breast cancer     4  Screening for cervical cancer         Subjective:        Patient ID: Hi Berkowitz is a 61 y o  female  Chief Complaint   Patient presents with    Follow-up     6 month follow up       BP ck  The following portions of the patient's history were reviewed and updated as appropriate: past medical history, past surgical history and problem list       Review of Systems   Constitutional: Negative for fatigue  Eyes: Negative for visual disturbance  Respiratory: Negative for cough and shortness of breath  Cardiovascular: Negative for chest pain, palpitations and leg swelling  Gastrointestinal: Negative for abdominal pain  Neurological: Negative for dizziness and headaches  Psychiatric/Behavioral: The patient is not nervous/anxious            Objective:  /82   Pulse 96   Temp 97 6 °F (36 4 °C) (Temporal)   Resp 16   Ht 5' 2" (1 575 m)   Wt 59 8 kg (131 lb 12 8 oz)   SpO2 96%   BMI 24 11 kg/m² Physical Exam  Vitals and nursing note reviewed  Constitutional:       General: She is not in acute distress  HENT:      Head: Normocephalic  Eyes:      General: No scleral icterus  Pupils: Pupils are equal, round, and reactive to light  Cardiovascular:      Rate and Rhythm: Normal rate and regular rhythm  Heart sounds: Normal heart sounds  No murmur heard  Pulmonary:      Breath sounds: Normal breath sounds  Musculoskeletal:      Right lower leg: No edema  Left lower leg: No edema  Lymphadenopathy:      Cervical: No cervical adenopathy  Skin:     Coloration: Skin is not pale  Neurological:      General: No focal deficit present  Mental Status: She is alert and oriented to person, place, and time  Psychiatric:         Behavior: Behavior normal          Thought Content:  Thought content normal

## 2022-09-01 ENCOUNTER — APPOINTMENT (OUTPATIENT)
Dept: LAB | Facility: HOSPITAL | Age: 67
End: 2022-09-01
Payer: COMMERCIAL

## 2022-09-01 DIAGNOSIS — E78.00 PURE HYPERCHOLESTEROLEMIA: ICD-10-CM

## 2022-09-01 LAB
ALBUMIN SERPL BCP-MCNC: 4 G/DL (ref 3.5–5)
ALP SERPL-CCNC: 86 U/L (ref 46–116)
ALT SERPL W P-5'-P-CCNC: 43 U/L (ref 12–78)
ANION GAP SERPL CALCULATED.3IONS-SCNC: 10 MMOL/L (ref 4–13)
AST SERPL W P-5'-P-CCNC: 23 U/L (ref 5–45)
BILIRUB SERPL-MCNC: 0.45 MG/DL (ref 0.2–1)
BUN SERPL-MCNC: 9 MG/DL (ref 5–25)
CALCIUM SERPL-MCNC: 9.4 MG/DL (ref 8.3–10.1)
CHLORIDE SERPL-SCNC: 101 MMOL/L (ref 96–108)
CHOLEST SERPL-MCNC: 213 MG/DL
CK SERPL-CCNC: 94 U/L (ref 26–192)
CO2 SERPL-SCNC: 26 MMOL/L (ref 21–32)
CREAT SERPL-MCNC: 0.75 MG/DL (ref 0.6–1.3)
GFR SERPL CREATININE-BSD FRML MDRD: 84 ML/MIN/1.73SQ M
GLUCOSE P FAST SERPL-MCNC: 96 MG/DL (ref 65–99)
HDLC SERPL-MCNC: 120 MG/DL
LDLC SERPL CALC-MCNC: 88 MG/DL (ref 0–100)
NONHDLC SERPL-MCNC: 93 MG/DL
POTASSIUM SERPL-SCNC: 4.5 MMOL/L (ref 3.5–5.3)
PROT SERPL-MCNC: 7.5 G/DL (ref 6.4–8.4)
SODIUM SERPL-SCNC: 137 MMOL/L (ref 135–147)
TRIGL SERPL-MCNC: 27 MG/DL

## 2022-09-01 PROCEDURE — 80061 LIPID PANEL: CPT

## 2022-09-01 PROCEDURE — 36415 COLL VENOUS BLD VENIPUNCTURE: CPT

## 2022-09-01 PROCEDURE — 82550 ASSAY OF CK (CPK): CPT

## 2022-09-01 PROCEDURE — 80053 COMPREHEN METABOLIC PANEL: CPT

## 2022-09-02 ENCOUNTER — OFFICE VISIT (OUTPATIENT)
Dept: FAMILY MEDICINE CLINIC | Facility: CLINIC | Age: 67
End: 2022-09-02

## 2022-09-02 VITALS
OXYGEN SATURATION: 99 % | HEART RATE: 98 BPM | WEIGHT: 128.4 LBS | SYSTOLIC BLOOD PRESSURE: 158 MMHG | DIASTOLIC BLOOD PRESSURE: 84 MMHG | BODY MASS INDEX: 23.63 KG/M2 | TEMPERATURE: 97.5 F | RESPIRATION RATE: 16 BRPM | HEIGHT: 62 IN

## 2022-09-02 DIAGNOSIS — I10 BENIGN ESSENTIAL HYPERTENSION: ICD-10-CM

## 2022-09-02 DIAGNOSIS — Z12.31 ENCOUNTER FOR SCREENING MAMMOGRAM FOR BREAST CANCER: Primary | ICD-10-CM

## 2022-09-02 DIAGNOSIS — E78.00 PURE HYPERCHOLESTEROLEMIA: ICD-10-CM

## 2022-09-02 PROCEDURE — 99213 OFFICE O/P EST LOW 20 MIN: CPT | Performed by: FAMILY MEDICINE

## 2022-09-02 RX ORDER — HYDROCHLOROTHIAZIDE 12.5 MG/1
12.5 TABLET ORAL DAILY
Qty: 30 TABLET | Refills: 5 | Status: SHIPPED | OUTPATIENT
Start: 2022-09-02

## 2022-09-03 NOTE — PROGRESS NOTES
Assessment/Plan:  Blood pressure not at goal   Restart hydrochlorothiazide 12 5 daily  Repeat BMP 1 month  Patient called blood pressures over the next 2-3 weeks and we will continue to follow  LDL cholesterol now lower than 100 since starting rosuvastatin  No side effects Will have Medicare towards the end of the year and a secondary insurance or we can get a number of her studies that her overdue completed  Otherwise recheck 6 months with labs as ordered  Mammography ordered  Told her to contact the local hospitals as October many times offers free mammography is  No problem-specific Assessment & Plan notes found for this encounter  Diagnoses and all orders for this visit:    Encounter for screening mammogram for breast cancer  -     Mammo screening bilateral w 3d & cad; Future    Benign essential hypertension  -     hydrochlorothiazide (HYDRODIURIL) 12 5 mg tablet; Take 1 tablet (12 5 mg total) by mouth daily  -     Basic metabolic panel; Future    Pure hypercholesterolemia  -     Lipid panel; Future  -     Comprehensive metabolic panel; Future        1  Encounter for screening mammogram for breast cancer  Mammo screening bilateral w 3d & cad   2  Benign essential hypertension  hydrochlorothiazide (HYDRODIURIL) 12 5 mg tablet    Basic metabolic panel   3  Pure hypercholesterolemia  Lipid panel    Comprehensive metabolic panel       Subjective:        Patient ID: Rod Quiles is a 61 y o  female  Chief Complaint   Patient presents with    Follow-up       Blood pressure check  Still without insurance until November  Overall okay  No complaint      The following portions of the patient's history were reviewed and updated as appropriate: past medical history, past surgical history and problem list       Review of Systems   Constitutional: Negative for fatigue  Eyes: Negative for visual disturbance  Respiratory: Negative for cough and shortness of breath      Cardiovascular: Negative for chest pain, palpitations and leg swelling  Gastrointestinal: Negative for abdominal pain  Neurological: Negative for dizziness and headaches  Psychiatric/Behavioral: The patient is not nervous/anxious  Objective:  /84 (BP Location: Left arm, Patient Position: Sitting, Cuff Size: Adult)   Pulse 98   Temp 97 5 °F (36 4 °C) (Temporal)   Resp 16   Ht 5' 2" (1 575 m)   Wt 58 2 kg (128 lb 6 4 oz)   SpO2 99%   BMI 23 48 kg/m²        Physical Exam  Vitals and nursing note reviewed  Constitutional:       General: She is not in acute distress  HENT:      Head: Normocephalic  Eyes:      General: No scleral icterus  Pupils: Pupils are equal, round, and reactive to light  Cardiovascular:      Rate and Rhythm: Normal rate and regular rhythm  Heart sounds: Normal heart sounds  No murmur heard  Pulmonary:      Breath sounds: Normal breath sounds  Musculoskeletal:      Right lower leg: No edema  Left lower leg: No edema  Lymphadenopathy:      Cervical: No cervical adenopathy  Skin:     Coloration: Skin is not pale  Neurological:      General: No focal deficit present  Mental Status: She is alert and oriented to person, place, and time  Psychiatric:         Behavior: Behavior normal          Thought Content:  Thought content normal

## 2022-11-28 ENCOUNTER — IMMUNIZATIONS (OUTPATIENT)
Dept: FAMILY MEDICINE CLINIC | Facility: CLINIC | Age: 67
End: 2022-11-28

## 2022-11-28 DIAGNOSIS — Z23 ENCOUNTER FOR IMMUNIZATION: Primary | ICD-10-CM

## 2022-12-16 ENCOUNTER — TELEMEDICINE (OUTPATIENT)
Dept: FAMILY MEDICINE CLINIC | Facility: CLINIC | Age: 67
End: 2022-12-16

## 2022-12-16 DIAGNOSIS — R05.1 ACUTE COUGH: ICD-10-CM

## 2022-12-16 DIAGNOSIS — U07.1 COVID-19: Primary | ICD-10-CM

## 2022-12-16 RX ORDER — PREDNISONE 10 MG/1
TABLET ORAL
Qty: 15 TABLET | Refills: 0 | Status: SHIPPED | OUTPATIENT
Start: 2022-12-16 | End: 2022-12-21

## 2022-12-16 NOTE — PROGRESS NOTES
COVID-19 Outpatient Progress Note    Assessment/Plan:    Problem List Items Addressed This Visit        Other    COVID-19 - Primary   Other Visit Diagnoses     Acute cough        Relevant Medications    predniSONE 10 mg tablet         Disposition:     Patient has asymptomatic or mild COVID-19 infection  Based off CDC guidelines, they were recommended to isolate for 5 days  If they are asymptomatic or symptoms are improving with no fevers in the past 24 hours, isolation may be ended followed by 5 days of wearing a mask when around othes to minimize risk of infecting others  If still have a fever or other symptoms have not improved, continue to isolate until they improve  Regardless of when they end isolation, avoid being around people who are more likely to get very sick from COVID-19 until at least day 11  Mild symptoms  Patients  has COPD but currently asymptomatic  Daughter tested positive for covid recently as well    - Order prednisone taper  - Increase PO hydration  - Dayquil/Nyquil as needed  - ED precautions if SOB  - Isolation discussed    RTC PRN    I have spent 8 minutes directly with the patient  Greater than 50% of this time was spent in counseling/coordination of care regarding: instructions for management and importance of treatment compliance  Encounter provider: Lo Ortiz MD     Provider located at: [address] [place] [address] PRIMARY CARE  [address] [facility] [address]  [facility] [address]  [facility] [address] [phone]     Recent Visits  No visits were found meeting these conditions  Showing recent visits within past 7 days and meeting all other requirements  Today's Visits  Date Type Provider Dept   12/16/22 Telemedicine Lo Ortiz MD [address] [place] [address] Primary Care   Showing today's visits and meeting all other requirements  Future Appointments  No visits were found meeting these conditions    Showing future appointments within next 150 days and meeting all other requirements     This virtual check-in was done via telephone and she agrees to proceed  Patient agrees to participate in a virtual check in via telephone or video visit instead of presenting to the office to address urgent/immediate medical needs  Patient is aware this is a billable service  She acknowledged consent and understanding of privacy and security of the video platform  The patient has agreed to participate and understands they can discontinue the visit at any time  After connecting through Telephone, the patient was identified by name and date of birth  Shobha Nieto was informed that this was a telemedicine visit and that the exam was being conducted confidentially over secure lines  My office door was closed  No one else was in the room  Hyun Browne acknowledged consent and understanding of privacy and security of the telemedicine visit  I informed the patient that I have reviewed her record in Epic and presented the opportunity for her to ask any questions regarding the visit today  The patient agreed to participate  It was my intent to perform this visit via video technology but the patient was not able to do a video connection so the visit was completed via audio telephone only  Verification of patient location:  Patient is located in the following state in which I hold an active license: [place]    Subjective:   Shobha Nieto is a 74 y o  female who has been screened for COVID-19  Symptom change since last report: unchanged  Patient's symptoms include fatigue, nasal congestion, rhinorrhea, cough, diarrhea, myalgias and headache  Patient denies fever and shortness of breath  - Date of symptom onset: 12/14/2022  - Date of positive COVID-19 test: 12/16/2022  Type of test: Home antigen  COVID-19 vaccination status: Fully vaccinated with booster    Hyun has been staying home and has isolated themselves in her home   She is taking care to not share personal items and is cleaning all surfaces that are touched often, like counters, tabletops, and doorknobs using household cleaning sprays or wipes  She is wearing a mask when she leaves her room  Lab Results   Component Value Date    SARSCOV2 Not Detected 12/22/2020       Review of Systems   Constitutional: Positive for fatigue  Negative for fever  HENT: Positive for congestion and rhinorrhea  Respiratory: Positive for cough  Negative for shortness of breath  Gastrointestinal: Positive for diarrhea  Musculoskeletal: Positive for myalgias  Neurological: Positive for headaches  Current Outpatient Medications on File Prior to Visit   Medication Sig   • Ascorbic Acid (vitamin C) 1000 MG tablet Take 1,000 mg by mouth daily   • cholecalciferol (VITAMIN D3) 1,000 units tablet Take 1,000 Units by mouth daily   • EPINEPHrine (EPIPEN) 0 3 mg/0 3 mL SOAJ Inject 0 3 mL as directed   • hydrochlorothiazide (HYDRODIURIL) 12 5 mg tablet Take 1 tablet (12 5 mg total) by mouth daily   • lisinopril (ZESTRIL) 20 mg tablet Take 1 tablet (20 mg total) by mouth 2 (two) times a day   • rosuvastatin (CRESTOR) 10 MG tablet Take 1 tablet (10 mg total) by mouth daily   • zinc gluconate 50 mg tablet Take 50 mg by mouth daily       Objective: There were no vitals taken for this visit  Physical Exam  Pulmonary:      Effort: No respiratory distress         Bud Mckeon MD

## 2023-01-31 DIAGNOSIS — E78.00 PURE HYPERCHOLESTEROLEMIA: ICD-10-CM

## 2023-01-31 DIAGNOSIS — I10 ESSENTIAL HYPERTENSION: ICD-10-CM

## 2023-01-31 RX ORDER — ROSUVASTATIN CALCIUM 10 MG/1
TABLET, COATED ORAL
Qty: 90 TABLET | Refills: 0 | Status: SHIPPED | OUTPATIENT
Start: 2023-01-31

## 2023-01-31 RX ORDER — LISINOPRIL 20 MG/1
TABLET ORAL
Qty: 180 TABLET | Refills: 0 | Status: SHIPPED | OUTPATIENT
Start: 2023-01-31

## 2023-02-27 ENCOUNTER — RA CDI HCC (OUTPATIENT)
Dept: OTHER | Facility: HOSPITAL | Age: 68
End: 2023-02-27

## 2023-02-27 NOTE — PROGRESS NOTES
[facility]  coding opportunities       Chart reviewed, no opportunity found:   [address]        Patients Insurance     Medicare Insurance: [facility]

## 2023-03-06 ENCOUNTER — OFFICE VISIT (OUTPATIENT)
Dept: FAMILY MEDICINE CLINIC | Facility: CLINIC | Age: 68
End: 2023-03-06

## 2023-03-06 DIAGNOSIS — Z80.0 FAMILY HISTORY OF COLON CANCER: ICD-10-CM

## 2023-03-06 DIAGNOSIS — E78.00 PURE HYPERCHOLESTEROLEMIA: ICD-10-CM

## 2023-03-06 DIAGNOSIS — Z13.820 ENCOUNTER FOR OSTEOPOROSIS SCREENING IN ASYMPTOMATIC POSTMENOPAUSAL PATIENT: ICD-10-CM

## 2023-03-06 DIAGNOSIS — Z23 ENCOUNTER FOR IMMUNIZATION: ICD-10-CM

## 2023-03-06 DIAGNOSIS — E55.9 VITAMIN D DEFICIENCY: ICD-10-CM

## 2023-03-06 DIAGNOSIS — Z12.11 COLON CANCER SCREENING: ICD-10-CM

## 2023-03-06 DIAGNOSIS — Z12.31 ENCOUNTER FOR SCREENING MAMMOGRAM FOR BREAST CANCER: ICD-10-CM

## 2023-03-06 DIAGNOSIS — Z72.0 TOBACCO ABUSE: ICD-10-CM

## 2023-03-06 DIAGNOSIS — Z78.0 ENCOUNTER FOR OSTEOPOROSIS SCREENING IN ASYMPTOMATIC POSTMENOPAUSAL PATIENT: ICD-10-CM

## 2023-03-06 DIAGNOSIS — I10 BENIGN ESSENTIAL HYPERTENSION: Primary | ICD-10-CM

## 2023-03-06 RX ORDER — NICOTINE 21 MG/24HR
1 PATCH, TRANSDERMAL 24 HOURS TRANSDERMAL EVERY 24 HOURS
Qty: 28 PATCH | Refills: 0 | Status: SHIPPED | OUTPATIENT
Start: 2023-03-06

## 2023-03-06 NOTE — PROGRESS NOTES
Assessment and Plan:   Medicare wellness completed  Living will and power of  discussed  Blood pressure fair  Now has insurance  Needs full updated labs  Pneumo 20 given today  DEXA scan ordered  Refer to gastroenterology for overdue colonoscopy  TSH and vitamin D ordered  Still has labs pending from September  We will get  Otherwise recheck every 6 months  Problem List Items Addressed This Visit    None  Visit Diagnoses     Encounter for immunization        Encounter for screening mammogram for breast cancer              Depression Screening and Follow-up Plan: Patient was screened for depression during today's encounter  They screened negative with a PHQ-2 score of 0  Preventive health issues were discussed with patient, and age appropriate screening tests were ordered as noted in patient's After Visit Summary  Personalized health advice and appropriate referrals for health education or preventive services given if needed, as noted in patient's After Visit Summary  History of Present Illness:     Patient presents for a Medicare Wellness Visit    Patient now with Medicare Medicare wellness  Blood pressure check  Feels well  Work stressful     Patient Care Team:  Angela Powell DO as PCP - Allyson Carrillo MD     Review of Systems:     Review of Systems   Constitutional: Negative for appetite change, fatigue, fever and unexpected weight change  HENT: Negative for congestion, ear pain, postnasal drip, rhinorrhea, sinus pressure, sinus pain and sore throat  Eyes: Negative for redness and visual disturbance  Respiratory: Negative for chest tightness and shortness of breath  Cardiovascular: Negative for chest pain, palpitations and leg swelling  Gastrointestinal: Negative for abdominal distention, abdominal pain, diarrhea and nausea  Endocrine: Negative for cold intolerance and heat intolerance  Genitourinary: Negative for dysuria and hematuria     Musculoskeletal: Negative for arthralgias, gait problem and myalgias  Skin: Negative for pallor and rash  Neurological: Negative for dizziness, tremors, weakness, light-headedness and headaches  Psychiatric/Behavioral: Negative for behavioral problems  The patient is not nervous/anxious           Problem List:     Patient Active Problem List   Diagnosis   • Benign essential hypertension   • Carotid artery plaque   • Fibrocystic breast disease   • Hyperlipidemia   • Systolic ejection murmur   • Tobacco use   • COVID-19      Past Medical and Surgical History:     Past Medical History:   Diagnosis Date   • Carotid artery stenosis    • Cyst of breast    • Dyspareunia in female    • Personal history of endometriosis     RESOLVED: 12/14/16   • Pre-diabetes     LAST ASSESSED: 12/14/16     Past Surgical History:   Procedure Laterality Date   • COLONOSCOPY      COMPLETE; 2009-2016-2021 Dipolitto   • COLPOSCOPY W/ BIOPSY / CURETTAGE      COLPOSCOPY CERVIX WITH BIOPSY(S) WITH ENDOCERVICAL CURETTAGE; JANUARY 1981, JANUARY 02   • ENDOMETRIAL BIOPSY      BY SUCTION; A/24/99 WITH FOCAL CROWDING POSSIBLE SIMPLE HYPERPLASIA    • LAPAROSCOPIC TUBAL LIGATION Bilateral 11/1990 NOVEMBER 1990 WITH BILATERAL TUBAL ALONG WITH ELECTROCOAGULATION OF ENDOMETRIAL IMPLANT IN THE RIGHT UTEROSACRAL LIGAMENT AND RESECTION OF ANTERIOR ABDOMINAL WALL NEVUS    • MOUTH SURGERY     • THYROGLOSSAL DUCT EXCISION      CYST   • TONSILLECTOMY AND ADENOIDECTOMY     • TUBAL LIGATION        Family History:     Family History   Problem Relation Age of Onset   • Hypertension Mother         BENIGN ESSENTIAL   • Stroke Mother         March 26, 2018   • Hypertension Father         BENIGN ESSENTIAL   • Skin cancer Father    • Diverticulosis Father    • Diverticulitis Father    • Lung cancer Father    • Spina bifida Sister    • Hypertension Brother         BENIGN ESSENTIAL   • Colon cancer Maternal Grandfather    • Hyperlipidemia Family    • Hypertension Family    • Heart attack Family       Social History:     Social History     Socioeconomic History   • Marital status: /Civil Union     Spouse name: Not on file   • Number of children: 2   • Years of education: Not on file   • Highest education level: Not on file   Occupational History   • Not on file   Tobacco Use   • Smoking status: Every Day     Packs/day: 1 00     Types: Cigarettes   • Smokeless tobacco: Never   • Tobacco comments:     TOBACCO USE   Vaping Use   • Vaping Use: Never used   Substance and Sexual Activity   • Alcohol use: Yes     Comment: BEING A SOCIAL DRINKER   • Drug use: No   • Sexual activity: Yes   Other Topics Concern   • Not on file   Social History Narrative    ALWAYS USES SEAT BELT    CAFFEINE USE    [employer]     Social Determinants of Health     Financial Resource Strain: Not on file   Food Insecurity: Not on file   Transportation Needs: Not on file   Physical Activity: Not on file   Stress: Not on file   Social Connections: Not on file   Intimate Partner Violence: Not on file   Housing Stability: Not on file      Medications and Allergies:     Current Outpatient Medications   Medication Sig Dispense Refill   • Ascorbic Acid (vitamin C) 1000 MG tablet Take 1,000 mg by mouth daily     • cholecalciferol (VITAMIN D3) 1,000 units tablet Take 1,000 Units by mouth daily     • EPINEPHrine (EPIPEN) 0 3 mg/0 3 mL SOAJ Inject 0 3 mL as directed     • hydrochlorothiazide (HYDRODIURIL) 12 5 mg tablet Take 1 tablet (12 5 mg total) by mouth daily 30 tablet 5   • lisinopril (ZESTRIL) 20 mg tablet TAKE 1 TABLET BY MOUTH TWICE DAILY 180 tablet 0   • rosuvastatin (CRESTOR) 10 MG tablet TAKE ONE TABLET BY MOUTH ONCE DAILY 90 tablet 0   • zinc gluconate 50 mg tablet Take 50 mg by mouth daily       No current facility-administered medications for this visit       Allergies   Allergen Reactions   • Bee Venom Anaphylaxis, Anxiety, Hives, Itching, Palpitations, Shortness Of Breath, Swelling and Throat Swelling   • Amlodipine Myalgia   • Percocet  [Oxycodone-Acetaminophen]      Other reaction(s): GI Upset      Immunizations:     Immunization History   Administered Date(s) Administered   • COVID-19 PFIZER VACCINE 0 3 ML IM 03/17/2021, 04/07/2021, 11/27/2021, 04/27/2022   • COVID-19 Pfizer vac (Danyel-sucrose, gray cap) 12 yr+ IM 11/15/2022   • INFLUENZA 11/30/2018, 12/28/2021   • Influenza Quadrivalent Preservative Free 3 years and older IM 10/30/2014, 12/04/2015   • Influenza Quadrivalent, 6-35 Months IM 12/30/2016, 12/19/2017   • Influenza, high dose seasonal 0 7 mL 11/28/2022   • Influenza, recombinant, quadrivalent,injectable, preservative free 11/30/2018, 11/18/2020   • Influenza, seasonal, injectable 12/17/2012, 12/20/2013   • Pneumococcal Conjugate 13-Valent 06/06/2016   • Tdap 09/18/2018   • Zoster 09/30/2016      Health Maintenance:         Topic Date Due   • Breast Cancer Screening: Mammogram  10/31/2017   • Colorectal Cancer Screening  05/27/2023 (Originally 11/17/2002)   • HIV Screening  06/13/2025 (Originally 11/17/1970)   • Hepatitis C Screening  Completed         Topic Date Due   • Pneumococcal Vaccine: 65+ Years (2 - PPSV23 if available, else PCV20) 06/06/2017      Medicare Screening Tests and Risk Assessments:     Roel Arredondo is here for her Initial Wellness visit  Health Risk Assessment:   Patient rates overall health as very good  Patient feels that their physical health rating is same  Patient is satisfied with their life  Eyesight was rated as same  Hearing was rated as same  Patient feels that their emotional and mental health rating is much better  Patients states they are never, rarely angry  Patient states they are sometimes unusually tired/fatigued  Pain experienced in the last 7 days has been none  Patient states that she has experienced no weight loss or gain in last 6 months  Depression Screening:   PHQ-2 Score: 0      Fall Risk Screening:    In the past year, patient has experienced: no history of falling in past year      Urinary Incontinence Screening:   Patient has not leaked urine accidently in the last six months  Home Safety:  Patient does not have trouble with stairs inside or outside of their home  Patient has working smoke alarms and has working carbon monoxide detector  Home safety hazards include: none  Nutrition:   Current diet is Regular  Medications:   Patient is currently taking over-the-counter supplements  OTC medications include: see medication list  Patient is able to manage medications  Activities of Daily Living (ADLs)/Instrumental Activities of Daily Living (IADLs):   Walk and transfer into and out of bed and chair?: Yes  Dress and groom yourself?: Yes    Bathe or shower yourself?: Yes    Feed yourself? Yes  Do your laundry/housekeeping?: Yes  Manage your money, pay your bills and track your expenses?: Yes  Make your own meals?: Yes    Do your own shopping?: Yes    Previous Hospitalizations:   Any hospitalizations or ED visits within the last 12 months?: No      Advance Care Planning:   Living will: No    Durable POA for healthcare:  Yes    Advanced directive: No      Cognitive Screening:   Provider or family/friend/caregiver concerned regarding cognition?: No    PREVENTIVE SCREENINGS      Cardiovascular Screening:    General: Screening Not Indicated and History Lipid Disorder      Diabetes Screening:     General: Screening Current      Colorectal Cancer Screening:     General: Screening Current      Breast Cancer Screening:       Due for: Mammogram        Cervical Cancer Screening:    General: Screening Not Indicated      Osteoporosis Screening:      Due for: DXA Axial and DXA Appendicular      Abdominal Aortic Aneurysm (AAA) Screening:        General: Risks and Benefits Discussed      Lung Cancer Screening:     General: Risks and Benefits Discussed      Hepatitis C Screening:    General: Screening Current    Screening, Brief Intervention, and Referral to Treatment (SBIRT)    Screening  Typical number of drinks in a day: 0  Typical number of drinks in a week: 0  Interpretation: Low risk drinking behavior  Single Item Drug Screening:  How often have you used an illegal drug (including marijuana) or a prescription medication for non-medical reasons in the past year? never    Single Item Drug Screen Score: 0  Interpretation: Negative screen for possible drug use disorder    No results found  Physical Exam:     There were no vitals taken for this visit  Physical Exam  Vitals and nursing note reviewed  Constitutional:       General: She is not in acute distress  Appearance: Normal appearance  She is well-developed  She is not ill-appearing  HENT:      Head: Normocephalic and atraumatic  Eyes:      Conjunctiva/sclera: Conjunctivae normal    Cardiovascular:      Rate and Rhythm: Normal rate and regular rhythm  Heart sounds: No murmur heard  Pulmonary:      Effort: Pulmonary effort is normal  No respiratory distress  Breath sounds: Normal breath sounds  Abdominal:      Palpations: Abdomen is soft  Tenderness: There is no abdominal tenderness  Musculoskeletal:         General: No swelling  Cervical back: Neck supple  Skin:     General: Skin is warm and dry  Capillary Refill: Capillary refill takes less than 2 seconds  Neurological:      General: No focal deficit present  Mental Status: She is alert  Mental status is at baseline  Psychiatric:         Mood and Affect: Mood normal          Thought Content:  Thought content normal          Judgment: Judgment normal           Ava Every, DO

## 2023-03-11 VITALS
HEIGHT: 62 IN | DIASTOLIC BLOOD PRESSURE: 82 MMHG | TEMPERATURE: 97.6 F | RESPIRATION RATE: 16 BRPM | HEART RATE: 94 BPM | OXYGEN SATURATION: 99 % | SYSTOLIC BLOOD PRESSURE: 140 MMHG | BODY MASS INDEX: 24.25 KG/M2 | WEIGHT: 131.8 LBS

## 2023-03-17 ENCOUNTER — HOSPITAL ENCOUNTER (OUTPATIENT)
Dept: MAMMOGRAPHY | Facility: CLINIC | Age: 68
Discharge: HOME | End: 2023-03-17

## 2023-03-17 VITALS — WEIGHT: 131 LBS | HEIGHT: 62 IN | BODY MASS INDEX: 24.11 KG/M2

## 2023-03-17 DIAGNOSIS — Z12.31 ENCOUNTER FOR SCREENING MAMMOGRAM FOR BREAST CANCER: ICD-10-CM

## 2023-03-24 ENCOUNTER — APPOINTMENT (OUTPATIENT)
Dept: LAB | Facility: CLINIC | Age: 68
End: 2023-03-24

## 2023-03-24 DIAGNOSIS — E78.00 PURE HYPERCHOLESTEROLEMIA: ICD-10-CM

## 2023-03-24 DIAGNOSIS — E55.9 VITAMIN D DEFICIENCY: ICD-10-CM

## 2023-03-24 DIAGNOSIS — I10 BENIGN ESSENTIAL HYPERTENSION: ICD-10-CM

## 2023-03-24 LAB
25(OH)D3 SERPL-MCNC: 17.4 NG/ML (ref 30–100)
ALBUMIN SERPL BCP-MCNC: 4 G/DL (ref 3.5–5)
ALP SERPL-CCNC: 82 U/L (ref 46–116)
ALT SERPL W P-5'-P-CCNC: 22 U/L (ref 12–78)
ANION GAP SERPL CALCULATED.3IONS-SCNC: 4 MMOL/L (ref 4–13)
AST SERPL W P-5'-P-CCNC: 15 U/L (ref 5–45)
BILIRUB SERPL-MCNC: 0.38 MG/DL (ref 0.2–1)
BUN SERPL-MCNC: 11 MG/DL (ref 5–25)
CALCIUM SERPL-MCNC: 9.7 MG/DL (ref 8.3–10.1)
CHLORIDE SERPL-SCNC: 102 MMOL/L (ref 96–108)
CHOLEST SERPL-MCNC: 218 MG/DL
CO2 SERPL-SCNC: 24 MMOL/L (ref 21–32)
CREAT SERPL-MCNC: 0.67 MG/DL (ref 0.6–1.3)
GFR SERPL CREATININE-BSD FRML MDRD: 92 ML/MIN/1.73SQ M
GLUCOSE P FAST SERPL-MCNC: 98 MG/DL (ref 65–99)
HDLC SERPL-MCNC: 95 MG/DL
LDLC SERPL CALC-MCNC: 113 MG/DL (ref 0–100)
NONHDLC SERPL-MCNC: 123 MG/DL
POTASSIUM SERPL-SCNC: 4.2 MMOL/L (ref 3.5–5.3)
PROT SERPL-MCNC: 7.3 G/DL (ref 6.4–8.4)
SODIUM SERPL-SCNC: 130 MMOL/L (ref 135–147)
TRIGL SERPL-MCNC: 48 MG/DL
TSH SERPL DL<=0.05 MIU/L-ACNC: 0.62 UIU/ML (ref 0.45–4.5)

## 2023-03-24 RX ORDER — HYDROCHLOROTHIAZIDE 12.5 MG/1
12.5 TABLET ORAL DAILY
Qty: 30 TABLET | Refills: 0 | Status: SHIPPED | OUTPATIENT
Start: 2023-03-24

## 2023-03-28 DIAGNOSIS — E87.1 HYPONATREMIA: Primary | ICD-10-CM

## 2023-04-29 DIAGNOSIS — I10 BENIGN ESSENTIAL HYPERTENSION: ICD-10-CM

## 2023-04-29 DIAGNOSIS — I10 ESSENTIAL HYPERTENSION: ICD-10-CM

## 2023-04-29 DIAGNOSIS — E78.00 PURE HYPERCHOLESTEROLEMIA: ICD-10-CM

## 2023-05-01 RX ORDER — LISINOPRIL 20 MG/1
TABLET ORAL
Qty: 180 TABLET | Refills: 0 | Status: SHIPPED | OUTPATIENT
Start: 2023-05-01

## 2023-05-01 RX ORDER — HYDROCHLOROTHIAZIDE 12.5 MG/1
12.5 TABLET ORAL DAILY
Qty: 90 TABLET | Refills: 0 | Status: SHIPPED | OUTPATIENT
Start: 2023-05-01

## 2023-05-01 RX ORDER — ROSUVASTATIN CALCIUM 10 MG/1
TABLET, COATED ORAL
Qty: 90 TABLET | Refills: 0 | Status: SHIPPED | OUTPATIENT
Start: 2023-05-01

## 2023-05-04 ENCOUNTER — APPOINTMENT (OUTPATIENT)
Dept: LAB | Facility: CLINIC | Age: 68
End: 2023-05-04

## 2023-05-04 DIAGNOSIS — E87.1 HYPONATREMIA: ICD-10-CM

## 2023-05-04 LAB
CREAT UR-MCNC: 51.6 MG/DL
MICROALBUMIN UR-MCNC: <5 MG/L (ref 0–20)
MICROALBUMIN/CREAT 24H UR: <10 MG/G CREATININE (ref 0–30)
OSMOLALITY UR/SERPL-RTO: 281 MMOL/KG (ref 282–298)
OSMOLALITY UR: 384 MMOL/KG
PTH-INTACT SERPL-MCNC: 60.3 PG/ML (ref 18.4–80.1)
SODIUM 24H UR-SCNC: 47 MOL/L

## 2023-05-05 LAB
ANION GAP SERPL CALCULATED.3IONS-SCNC: 4 MMOL/L (ref 4–13)
BUN SERPL-MCNC: 10 MG/DL (ref 5–25)
CALCIUM SERPL-MCNC: 9.6 MG/DL (ref 8.3–10.1)
CHLORIDE SERPL-SCNC: 102 MMOL/L (ref 96–108)
CO2 SERPL-SCNC: 25 MMOL/L (ref 21–32)
CREAT SERPL-MCNC: 0.7 MG/DL (ref 0.6–1.3)
GFR SERPL CREATININE-BSD FRML MDRD: 91 ML/MIN/1.73SQ M
GLUCOSE P FAST SERPL-MCNC: 97 MG/DL (ref 65–99)
POTASSIUM SERPL-SCNC: 4.6 MMOL/L (ref 3.5–5.3)
SODIUM SERPL-SCNC: 131 MMOL/L (ref 135–147)

## 2023-05-14 DIAGNOSIS — R77.8 ABNORMAL SPEP: Primary | ICD-10-CM

## 2023-05-23 ENCOUNTER — APPOINTMENT (OUTPATIENT)
Dept: LAB | Facility: CLINIC | Age: 68
End: 2023-05-23

## 2023-05-23 DIAGNOSIS — R77.8 ABNORMAL SPEP: ICD-10-CM

## 2023-05-24 LAB
KAPPA LC FREE SER-MCNC: 11.7 MG/L (ref 3.3–19.4)
KAPPA LC FREE/LAMBDA FREE SER: 0.83 {RATIO} (ref 0.26–1.65)
LAMBDA LC FREE SERPL-MCNC: 14.1 MG/L (ref 5.7–26.3)

## 2023-07-27 DIAGNOSIS — E78.00 PURE HYPERCHOLESTEROLEMIA: ICD-10-CM

## 2023-07-27 DIAGNOSIS — I10 BENIGN ESSENTIAL HYPERTENSION: ICD-10-CM

## 2023-07-27 DIAGNOSIS — I10 ESSENTIAL HYPERTENSION: ICD-10-CM

## 2023-07-27 RX ORDER — LISINOPRIL 20 MG/1
TABLET ORAL
Qty: 180 TABLET | Refills: 0 | Status: SHIPPED | OUTPATIENT
Start: 2023-07-27

## 2023-07-27 RX ORDER — HYDROCHLOROTHIAZIDE 12.5 MG/1
12.5 TABLET ORAL DAILY
Qty: 90 TABLET | Refills: 0 | Status: SHIPPED | OUTPATIENT
Start: 2023-07-27

## 2023-07-27 RX ORDER — ROSUVASTATIN CALCIUM 10 MG/1
10 TABLET, COATED ORAL DAILY
Qty: 90 TABLET | Refills: 0 | Status: SHIPPED | OUTPATIENT
Start: 2023-07-27

## 2023-09-13 ENCOUNTER — TELEMEDICINE (OUTPATIENT)
Dept: FAMILY MEDICINE CLINIC | Facility: CLINIC | Age: 68
End: 2023-09-13
Payer: COMMERCIAL

## 2023-09-13 DIAGNOSIS — I10 ESSENTIAL HYPERTENSION: Primary | ICD-10-CM

## 2023-09-13 DIAGNOSIS — I65.23 ATHEROSCLEROSIS OF BOTH CAROTID ARTERIES: ICD-10-CM

## 2023-09-13 DIAGNOSIS — R01.1 SYSTOLIC EJECTION MURMUR: ICD-10-CM

## 2023-09-13 DIAGNOSIS — Z12.11 COLON CANCER SCREENING: ICD-10-CM

## 2023-09-13 DIAGNOSIS — Z91.030 BEE STING ALLERGY: ICD-10-CM

## 2023-09-13 DIAGNOSIS — E55.9 VITAMIN D DEFICIENCY: ICD-10-CM

## 2023-09-13 DIAGNOSIS — E78.00 PURE HYPERCHOLESTEROLEMIA: ICD-10-CM

## 2023-09-13 DIAGNOSIS — D36.9 PAPILLOMA: ICD-10-CM

## 2023-09-13 PROCEDURE — 99214 OFFICE O/P EST MOD 30 MIN: CPT | Performed by: FAMILY MEDICINE

## 2023-09-13 RX ORDER — EPINEPHRINE 0.3 MG/.3ML
0.3 INJECTION SUBCUTANEOUS ONCE
Qty: 0.6 ML | Refills: 0 | Status: SHIPPED | OUTPATIENT
Start: 2023-09-13 | End: 2023-09-13

## 2023-09-14 ENCOUNTER — HOSPITAL ENCOUNTER (OUTPATIENT)
Dept: BONE DENSITY | Facility: MEDICAL CENTER | Age: 68
Discharge: HOME | End: 2023-09-14
Payer: COMMERCIAL

## 2023-09-14 DIAGNOSIS — Z78.0 ENCOUNTER FOR OSTEOPOROSIS SCREENING IN ASYMPTOMATIC POSTMENOPAUSAL PATIENT: ICD-10-CM

## 2023-09-14 DIAGNOSIS — Z13.820 ENCOUNTER FOR OSTEOPOROSIS SCREENING IN ASYMPTOMATIC POSTMENOPAUSAL PATIENT: ICD-10-CM

## 2023-09-14 PROCEDURE — 77080 DXA BONE DENSITY AXIAL: CPT

## 2023-09-17 VITALS
BODY MASS INDEX: 23.39 KG/M2 | HEART RATE: 87 BPM | DIASTOLIC BLOOD PRESSURE: 84 MMHG | SYSTOLIC BLOOD PRESSURE: 140 MMHG | WEIGHT: 127.9 LBS

## 2023-09-17 PROBLEM — U07.1 COVID-19: Status: RESOLVED | Noted: 2022-12-16 | Resolved: 2023-09-17

## 2023-09-17 NOTE — PROGRESS NOTES
Virtual Brief Visit    This Visit is being completed by telephone. The Patient is located at Home and in the following state in which I hold an active license [place]    The patient was identified by name and date of birth. Torito Funez was informed that this is a telemedicine visit and that the visit is being conducted through Telephone. My office door was closed. No one else was in the room. She acknowledged consent and understanding of privacy and security of the video platform. The patient has agreed to participate and understands they can discontinue the visit at any time. Patient is aware this is a billable service. Routine recheck. Feels fine. No complaint. Review of Systems   Constitutional: Negative for activity change and fatigue. Eyes: Negative for visual disturbance. Respiratory: Negative for cough, chest tightness and shortness of breath. Cardiovascular: Negative for chest pain, palpitations and leg swelling. Gastrointestinal: Negative for abdominal pain. Skin:        And shin what sounds like a papilloma type growth left thoracic area. Neurological: Negative for dizziness and headaches. Psychiatric/Behavioral: The patient is not nervous/anxious. Assessment/Plan: Blood pressure fair. Recommend continue to check 1-2 times daily call with any significant systolic blood pressure over 135 on any regular basis. Smoking reduction reviewed. Had some minor hyponatremia. Work-up completed. Pending recheck labs in 6 months May need referral to nephrology. Patient aware of this. No symptoms. Symptoms of hyponatremia reviewed. Has what sounds like a papilloma on the left thoracic area. Refer to dermatology for potential excision. Overdue for colonoscopy. Refer to gastroenterology. Labs ordered for 6 months. Overdue for carotid duplex. She will follow-up with our nurse practitioner in 6 months as I am retiring. Recommend yearly mammography.   Wish her good health and happiness. Problem List Items Addressed This Visit        Cardiovascular and Mediastinum    Carotid artery plaque    Relevant Orders    VAS carotid complete study       Other    Hyperlipidemia    Relevant Orders    Lipid panel    Systolic ejection murmur   Other Visit Diagnoses     Essential hypertension    -  Primary    Relevant Orders    Lipid panel    Comprehensive metabolic panel    Papilloma        Relevant Orders    Ambulatory Referral to Dermatology    Bee sting allergy        Vitamin D deficiency        Relevant Orders    Vitamin D 25 hydroxy    Colon cancer screening        Relevant Orders    Ambulatory Referral to Gastroenterology          Recent Visits  Date Type Provider Dept   09/13/23 [facility], [address] [place] [address],[facility]   Showing recent visits within past 7 days and meeting all other requirements  Future Appointments  No visits were found meeting these conditions.   Showing future appointments within next 150 days and meeting all other requirements         Visit Time  Total Visit Duration: 15

## 2023-10-25 DIAGNOSIS — I10 BENIGN ESSENTIAL HYPERTENSION: ICD-10-CM

## 2023-10-25 DIAGNOSIS — I10 ESSENTIAL HYPERTENSION: ICD-10-CM

## 2023-10-25 DIAGNOSIS — E78.00 PURE HYPERCHOLESTEROLEMIA: ICD-10-CM

## 2023-10-25 RX ORDER — LISINOPRIL 20 MG/1
TABLET ORAL
Qty: 180 TABLET | Refills: 0 | Status: SHIPPED | OUTPATIENT
Start: 2023-10-25

## 2023-10-25 RX ORDER — HYDROCHLOROTHIAZIDE 12.5 MG/1
12.5 TABLET ORAL DAILY
Qty: 90 TABLET | Refills: 0 | Status: SHIPPED | OUTPATIENT
Start: 2023-10-25

## 2023-10-25 RX ORDER — ROSUVASTATIN CALCIUM 10 MG/1
10 TABLET, COATED ORAL DAILY
Qty: 90 TABLET | Refills: 0 | Status: SHIPPED | OUTPATIENT
Start: 2023-10-25

## 2023-12-01 ENCOUNTER — PREP FOR PROCEDURE (OUTPATIENT)
Age: 68
End: 2023-12-01

## 2023-12-01 ENCOUNTER — TELEPHONE (OUTPATIENT)
Age: 68
End: 2023-12-01

## 2023-12-01 DIAGNOSIS — Z12.11 SCREENING FOR COLON CANCER: Primary | ICD-10-CM

## 2023-12-01 NOTE — TELEPHONE ENCOUNTER
Scheduled date of colonoscopy (as of today): 2/15/24  Physician performing colonoscopy: Maico oCrrea  Location of colonoscopy: WEST ROOM   Bowel prep reviewed with patient: Yes  Instructions sent to [facility]  Clearances:  Cardiac? NO CANNABIS USE     12/01/23  Screened by: Carol Knight    Referring Provider     Pre- Screening: There is no height or weight on file to calculate BMI. BMI 23.39  Has patient been referred for a routine screening Colonoscopy? yes  Is the patient between 43-73 years old? yes      Previous Colonoscopy yes   If yes:    Date:  2016    Facility:     Reason:       SCHEDULING STAFF: If the patient is between 39yrs-51yrs, please advise patient to confirm benefits/coverage with their insurance company for a routine screening colonoscopy, some insurance carriers will only cover at [address] [place] [address] or older. If the patient is over 66years old, please schedule an office visit. Does the patient want to see a Gastroenterologist prior to their procedure OR are they having any GI symptoms? no    Has the patient been hospitalized or had abdominal surgery in the past 6 months? no    Does the patient use supplemental oxygen? no    Does the patient take Coumadin, Lovenox, Plavix, Elliquis, Xarelto, or other blood thinning medication? no    Has the patient had a stroke, cardiac event, or stent placed in the past year? no    SCHEDULING STAFF: If patient answers NO to above questions, then schedule procedure. If patient answers YES to above questions, then schedule office appointment. If patient is between 45yrs - 49yrs, please advise patient that we will have to confirm benefits & coverage with their insurance company for a routine screening colonoscopy.

## 2023-12-07 ENCOUNTER — TELEPHONE (OUTPATIENT)
Dept: GASTROENTEROLOGY | Facility: MEDICAL CENTER | Age: 68
End: 2023-12-07

## 2023-12-07 NOTE — TELEPHONE ENCOUNTER
----- Message from Americo Sage sent at 12/5/2023 11:16 AM EST -----  Regarding: FW: Review for ASC    ----- Message -----  From: Melia Gooden  Sent: 12/1/2023   9:52 AM EST  To: [facility] [place] [facility]  Subject: Review for ASC                                   During [address] [facility],[address] questions - The heart valve disease question gave me a yellow warning . Pt has hx of: Carotid Artery Plaque, and Systolic Ejection Murmur,conditions pt says are not new ,  has had a few  years.

## 2023-12-07 NOTE — TELEPHONE ENCOUNTER
Send a message to soc team about this patient please see below I copy answer for soc team     MD Arin Carrero; P Soc Md/Np  I can't find any documentation but it seems benign so I would so ok for ASC          Previous Messages       ----- Message -----  From: Arin Salinas  Sent: 12/5/2023   1:44 PM EST  To: Soc Md/Np  Subject: FW: Review for ASC                              Please review chart, thanks  ----- Message -----  From: Med Redd  Sent: 12/5/2023  11:16 AM EST  To: [facility]  Subject: FW: Review for [address] [employer],[address]                                ----- Message -----  From: Oleg Lacy  Sent: 12/1/2023   9:52 AM EST  To: [facility] [place] [facility]  Subject: Review for ASC                                  During [address] [facility],[address] questions - The heart valve disease question gave me a yellow warning . Pt has hx of: Carotid Artery Plaque, and Systolic Ejection Murmur,conditions pt says are not new ,  has had a few  years.

## 2024-01-21 DIAGNOSIS — I10 BENIGN ESSENTIAL HYPERTENSION: ICD-10-CM

## 2024-01-21 DIAGNOSIS — I10 ESSENTIAL HYPERTENSION: ICD-10-CM

## 2024-01-21 DIAGNOSIS — E78.00 PURE HYPERCHOLESTEROLEMIA: ICD-10-CM

## 2024-01-22 RX ORDER — ROSUVASTATIN CALCIUM 10 MG/1
10 TABLET, COATED ORAL DAILY
Qty: 90 TABLET | Refills: 0 | Status: SHIPPED | OUTPATIENT
Start: 2024-01-22

## 2024-01-22 RX ORDER — LISINOPRIL 20 MG/1
TABLET ORAL
Qty: 180 TABLET | Refills: 0 | Status: SHIPPED | OUTPATIENT
Start: 2024-01-22

## 2024-01-22 RX ORDER — HYDROCHLOROTHIAZIDE 12.5 MG/1
12.5 TABLET ORAL DAILY
Qty: 90 TABLET | Refills: 0 | Status: SHIPPED | OUTPATIENT
Start: 2024-01-22

## 2024-01-31 ENCOUNTER — ANESTHESIA (OUTPATIENT)
Dept: ANESTHESIOLOGY | Facility: HOSPITAL | Age: 69
End: 2024-01-31

## 2024-01-31 ENCOUNTER — ANESTHESIA EVENT (OUTPATIENT)
Dept: ANESTHESIOLOGY | Facility: HOSPITAL | Age: 69
End: 2024-01-31

## 2024-02-01 ENCOUNTER — TELEPHONE (OUTPATIENT)
Dept: GASTROENTEROLOGY | Facility: MEDICAL CENTER | Age: 69
End: 2024-02-01

## 2024-02-01 NOTE — TELEPHONE ENCOUNTER
Did you remind:    You will receive a call a day prior to let you know when to arrive.  Yes    Do you have a copy of your instructions? Yes    Did you remind them of special diets if applicable? Yes    Did you remind patient to start clear liquid diet if applicable? Yes    Did you remind patient to hold:  NA      Do you have any other questions or concerns? No/LVM

## 2024-02-13 RX ORDER — SODIUM CHLORIDE 9 MG/ML
125 INJECTION, SOLUTION INTRAVENOUS CONTINUOUS
Status: CANCELLED | OUTPATIENT
Start: 2024-02-13

## 2024-02-15 ENCOUNTER — ANESTHESIA EVENT (OUTPATIENT)
Dept: GASTROENTEROLOGY | Facility: MEDICAL CENTER | Age: 69
End: 2024-02-15

## 2024-02-15 ENCOUNTER — HOSPITAL ENCOUNTER (OUTPATIENT)
Dept: GASTROENTEROLOGY | Facility: MEDICAL CENTER | Age: 69
Setting detail: OUTPATIENT SURGERY
End: 2024-02-15
Payer: COMMERCIAL

## 2024-02-15 ENCOUNTER — ANESTHESIA (OUTPATIENT)
Dept: GASTROENTEROLOGY | Facility: MEDICAL CENTER | Age: 69
End: 2024-02-15

## 2024-02-15 VITALS
WEIGHT: 120 LBS | TEMPERATURE: 97.8 F | HEART RATE: 68 BPM | OXYGEN SATURATION: 99 % | RESPIRATION RATE: 18 BRPM | DIASTOLIC BLOOD PRESSURE: 85 MMHG | SYSTOLIC BLOOD PRESSURE: 155 MMHG | BODY MASS INDEX: 22.08 KG/M2 | HEIGHT: 62 IN

## 2024-02-15 DIAGNOSIS — Z12.11 SCREENING FOR COLON CANCER: ICD-10-CM

## 2024-02-15 PROBLEM — IMO0001 SMOKING: Status: ACTIVE | Noted: 2024-02-15

## 2024-02-15 PROBLEM — F17.200 SMOKING: Status: ACTIVE | Noted: 2024-02-15

## 2024-02-15 PROCEDURE — 88305 TISSUE EXAM BY PATHOLOGIST: CPT | Performed by: PATHOLOGY

## 2024-02-15 PROCEDURE — 45385 COLONOSCOPY W/LESION REMOVAL: CPT | Performed by: INTERNAL MEDICINE

## 2024-02-15 RX ORDER — PROPOFOL 10 MG/ML
INJECTION, EMULSION INTRAVENOUS AS NEEDED
Status: DISCONTINUED | OUTPATIENT
Start: 2024-02-15 | End: 2024-02-15

## 2024-02-15 RX ORDER — SODIUM CHLORIDE 9 MG/ML
125 INJECTION, SOLUTION INTRAVENOUS CONTINUOUS
Status: DISCONTINUED | OUTPATIENT
Start: 2024-02-15 | End: 2024-02-19 | Stop reason: HOSPADM

## 2024-02-15 RX ADMIN — PROPOFOL 120 MCG/KG/MIN: 10 INJECTION, EMULSION INTRAVENOUS at 08:39

## 2024-02-15 RX ADMIN — SODIUM CHLORIDE 125 ML/HR: 0.9 INJECTION, SOLUTION INTRAVENOUS at 08:27

## 2024-02-15 RX ADMIN — Medication 40 MG: at 08:44

## 2024-02-15 RX ADMIN — PROPOFOL 20 MG: 10 INJECTION, EMULSION INTRAVENOUS at 08:38

## 2024-02-15 RX ADMIN — PROPOFOL 50 MG: 10 INJECTION, EMULSION INTRAVENOUS at 08:37

## 2024-02-15 NOTE — H&P
H&P EXAM - Outpatient Endoscopy   Hyun Anthony 68 y.o. female MRN: [id number]    [facility] ENDOSCOPY   Encounter: [id number]        History and Physical - [facility]  Hyun Anthony 68 y.o. female MRN: [id number]                  HPI: Hyun Anthony is a 68 y.o. year old female who presents for colon cancer screening      REVIEW OF SYSTEMS: Per the HPI, and otherwise unremarkable.    Historical Information   Past Medical History:   Diagnosis Date    Carotid artery stenosis     Cyst of breast     Dyspareunia in female     Personal history of endometriosis     RESOLVED: 12/14/16    Pre-diabetes     LAST ASSESSED: 12/14/16     Past Surgical History:   Procedure Laterality Date    COLONOSCOPY      COMPLETE; [id number] Dipolitto    COLPOSCOPY W/ BIOPSY / CURETTAGE      COLPOSCOPY CERVIX WITH BIOPSY(S) WITH ENDOCERVICAL CURETTAGE; JANUARY 1981, JANUARY 02    ENDOMETRIAL BIOPSY      BY SUCTION; A/24/99 WITH FOCAL CROWDING POSSIBLE SIMPLE HYPERPLASIA     LAPAROSCOPIC TUBAL LIGATION Bilateral 11/1990 NOVEMBER 1990 WITH BILATERAL TUBAL ALONG WITH ELECTROCOAGULATION OF ENDOMETRIAL IMPLANT IN THE RIGHT UTEROSACRAL LIGAMENT AND RESECTION OF ANTERIOR ABDOMINAL WALL NEVUS     MOUTH SURGERY      THYROGLOSSAL DUCT EXCISION      CYST    TONSILLECTOMY AND ADENOIDECTOMY      TUBAL LIGATION       Social History   Social History     Substance and Sexual Activity   Alcohol Use Yes    Comment: BEING A SOCIAL DRINKER     Social History     Substance and Sexual Activity   Drug Use No     Social History     Tobacco Use   Smoking Status Every Day    Current packs/day: 1.00    Types: Cigarettes   Smokeless Tobacco Never   Tobacco Comments    TOBACCO USE     Family History   Problem Relation Age of Onset    Hypertension Mother         BENIGN ESSENTIAL    Stroke Mother         March 26, 2018    Hypertension Father         BENIGN ESSENTIAL    Skin cancer Father     Diverticulosis Father      Diverticulitis Father     Lung cancer Father     Spina bifida Sister     Colon cancer Maternal Grandfather     Hypertension Brother         BENIGN ESSENTIAL    Hyperlipidemia Family     Hypertension Family     Heart attack Family     No Known Problems Paternal Aunt     No Known Problems Paternal Aunt     Breast cancer Neg Hx        Meds/Allergies     (Not in a hospital admission)      Allergies   Allergen Reactions    Bee Venom Anaphylaxis, Anxiety, Hives, Itching, Palpitations, Shortness Of Breath, Swelling and Throat Swelling    Amlodipine Myalgia    Percocet  [Oxycodone-Acetaminophen]      Other reaction(s): GI Upset       Objective     There were no vitals taken for this visit.      PHYSICAL EXAM    Gen: NAD  CV: RRR  CHEST: Clear  ABD: soft, NT/ND  EXT: no edema      ASSESSMENT/PLAN:  This is a 68 y.o. year old female here for colonoscopy, and she is stable and optimized for her procedure.

## 2024-02-15 NOTE — ANESTHESIA PREPROCEDURE EVALUATION
Procedure:  COLONOSCOPY    Relevant Problems   ANESTHESIA (within normal limits)      CARDIO   (+) Essential hypertension   (+) Hyperlipidemia   (+) Systolic ejection murmur      PULMONARY   (+) Smoking        Physical Exam    Airway    Mallampati score: II  TM Distance: >3 FB  Neck ROM: full     Dental   No notable dental hx     Cardiovascular  Rhythm: regular, Rate: normal, Cardiovascular exam normal    Pulmonary  Pulmonary exam normal Breath sounds clear to auscultation    Other Findings  post-pubertal.      Anesthesia Plan  ASA Score- 2     Anesthesia Type- IV sedation with anesthesia with ASA Monitors.         Additional Monitors:     Airway Plan:            Plan Factors-Exercise tolerance (METS): >4 METS.    Chart reviewed.   Existing labs reviewed. Patient summary reviewed.    Patient is a current smoker.  Patient instructed to abstain from smoking on day of procedure. Patient did not smoke on day of surgery.            Induction- intravenous.    Postoperative Plan-     Informed Consent- Anesthetic plan and risks discussed with patient.

## 2024-02-15 NOTE — ANESTHESIA POSTPROCEDURE EVALUATION
"Post-Op Assessment Note    CV Status:  Stable    Pain management: adequate       Mental Status:  Alert and awake   Hydration Status:  Euvolemic   PONV Controlled:  Controlled   Airway Patency:  Patent  Two or more mitigation strategies used for obstructive sleep apnea   Post Op Vitals Reviewed: Yes    No anethesia notable event occurred.    Staff: Anesthesiologist               BP      Temp      Pulse     Resp      SpO2      /85   Pulse 68   Temp 97.8 °F (36.6 °C) (Temporal)   Resp 18   Ht 5' 2\" (1.575 m)   Wt 54.4 kg (120 lb)   SpO2 99%   BMI 21.95 kg/m²     "

## 2024-02-19 PROCEDURE — 88305 TISSUE EXAM BY PATHOLOGIST: CPT | Performed by: PATHOLOGY

## 2024-02-19 NOTE — RESULT ENCOUNTER NOTE
Results relayed via Mychart  2 subcentimeter sessile serrated lesions.  Repeat colonoscopy in 5 years

## 2024-03-14 ENCOUNTER — OFFICE VISIT (OUTPATIENT)
Dept: FAMILY MEDICINE CLINIC | Facility: CLINIC | Age: 69
End: 2024-03-14
Payer: COMMERCIAL

## 2024-03-14 VITALS
DIASTOLIC BLOOD PRESSURE: 94 MMHG | WEIGHT: 122.6 LBS | RESPIRATION RATE: 16 BRPM | TEMPERATURE: 97 F | SYSTOLIC BLOOD PRESSURE: 150 MMHG | OXYGEN SATURATION: 99 % | HEIGHT: 62 IN | HEART RATE: 88 BPM | BODY MASS INDEX: 22.56 KG/M2

## 2024-03-14 DIAGNOSIS — I10 ESSENTIAL HYPERTENSION: ICD-10-CM

## 2024-03-14 DIAGNOSIS — R01.1 SYSTOLIC EJECTION MURMUR: ICD-10-CM

## 2024-03-14 DIAGNOSIS — Z72.0 TOBACCO USE: ICD-10-CM

## 2024-03-14 DIAGNOSIS — L98.9 SKIN LESION: ICD-10-CM

## 2024-03-14 DIAGNOSIS — Z00.00 MEDICARE ANNUAL WELLNESS VISIT, SUBSEQUENT: Primary | ICD-10-CM

## 2024-03-14 DIAGNOSIS — M85.80 OSTEOPENIA, UNSPECIFIED LOCATION: ICD-10-CM

## 2024-03-14 DIAGNOSIS — I65.23 ATHEROSCLEROSIS OF BOTH CAROTID ARTERIES: ICD-10-CM

## 2024-03-14 DIAGNOSIS — Z87.891 PERSONAL HISTORY OF NICOTINE DEPENDENCE: ICD-10-CM

## 2024-03-14 DIAGNOSIS — E78.00 PURE HYPERCHOLESTEROLEMIA: ICD-10-CM

## 2024-03-14 PROCEDURE — G0439 PPPS, SUBSEQ VISIT: HCPCS | Performed by: NURSE PRACTITIONER

## 2024-03-14 PROCEDURE — 99214 OFFICE O/P EST MOD 30 MIN: CPT | Performed by: NURSE PRACTITIONER

## 2024-03-14 RX ORDER — GINGER ROOT/GINGER ROOT EXT 262.5 MG
CAPSULE ORAL
COMMUNITY
Start: 2023-09-15

## 2024-03-14 NOTE — PATIENT INSTRUCTIONS
Update carotid artery ultrasound.     Complete echocardiogram.     Can complete coronary CT as discussed.     Complete low dose CT for lung screening.     Complete labs- these are fasting.     Follow up with dermatology.     Return for skin lesion removal.     Please call the office if you are experiencing any worsening of symptoms or no symptom improvement.     Medicare Preventive Visit Patient Instructions  Thank you for completing your Welcome to Medicare Visit or Medicare Annual Wellness Visit today. Your next wellness visit will be due in one year (3/15/2025).  The screening/preventive services that you may require over the next 5-10 years are detailed below. Some tests may not apply to you based off risk factors and/or age. Screening tests ordered at today's visit but not completed yet may show as past due. Also, please note that scanned in results may not display below.  Preventive Screenings:  Service Recommendations Previous Testing/Comments   Colorectal Cancer Screening  * Colonoscopy    * Fecal Occult Blood Test (FOBT)/Fecal Immunochemical Test (FIT)  * Fecal DNA/Cologuard Test  * Flexible Sigmoidoscopy Age: 45-75 years old   Colonoscopy: every 10 years (may be performed more frequently if at higher risk)  OR  FOBT/FIT: every 1 year  OR  Cologuard: every 3 years  OR  Sigmoidoscopy: every 5 years  Screening may be recommended earlier than age 45 if at higher risk for colorectal cancer. Also, an individualized decision between you and your healthcare provider will decide whether screening between the ages of 76-85 would be appropriate. Colonoscopy: 02/15/2024  FOBT/FIT: Not on file  Cologuard: Not on file  Sigmoidoscopy: Not on file    Screening Current     Breast Cancer Screening Age: 40+ years old  Frequency: every 1-2 years  Not required if history of left and right mastectomy Mammogram: 03/17/2023    Screening Current   Cervical Cancer Screening Between the ages of 21-29, pap smear recommended once  every 3 years.   Between the ages of 30-65, can perform pap smear with HPV co-testing every 5 years.   Recommendations may differ for women with a history of total hysterectomy, cervical cancer, or abnormal pap smears in past. Pap Smear: Not on file    Screening Not Indicated   Hepatitis C Screening Once for adults born between 1945 and 1965  More frequently in patients at high risk for Hepatitis C Hep C Antibody: 12/15/2017    Screening Current   Diabetes Screening 1-2 times per year if you're at risk for diabetes or have pre-diabetes Fasting glucose: 97 mg/dL (5/4/2023)  A1C: No results in last 5 years (No results in last 5 years)  Screening Current   Cholesterol Screening Once every 5 years if you don't have a lipid disorder. May order more often based on risk factors. Lipid panel: 03/24/2023    Screening Not Indicated  History Lipid Disorder     Other Preventive Screenings Covered by Medicare:  Abdominal Aortic Aneurysm (AAA) Screening: covered once if your at risk. You're considered to be at risk if you have a family history of AAA.  Lung Cancer Screening: covers low dose CT scan once per year if you meet all of the following conditions: (1) Age 55-77; (2) No signs or symptoms of lung cancer; (3) Current smoker or have quit smoking within the last 15 years; (4) You have a tobacco smoking history of at least 20 pack years (packs per day multiplied by number of years you smoked); (5) You get a written order from a healthcare provider.  Glaucoma Screening: covered annually if you're considered high risk: (1) You have diabetes OR (2) Family history of glaucoma OR (3)  aged 50 and older OR (4)  American aged 65 and older  Osteoporosis Screening: covered every 2 years if you meet one of the following conditions: (1) You're estrogen deficient and at risk for osteoporosis based off medical history and other findings; (2) Have a vertebral abnormality; (3) On glucocorticoid therapy for more than 3  months; (4) Have primary hyperparathyroidism; (5) On osteoporosis medications and need to assess response to drug therapy.   Last bone density test (DXA Scan): 09/18/2023.  HIV Screening: covered annually if you're between the age of 15-65. Also covered annually if you are younger than 15 and older than 65 with risk factors for HIV infection. For pregnant patients, it is covered up to 3 times per pregnancy.    Immunizations:  Immunization Recommendations   Influenza Vaccine Annual influenza vaccination during flu season is recommended for all persons aged >= 6 months who do not have contraindications   Pneumococcal Vaccine   * Pneumococcal conjugate vaccine = PCV13 (Prevnar 13), PCV15 (Vaxneuvance), PCV20 (Prevnar 20)  * Pneumococcal polysaccharide vaccine = PPSV23 (Pneumovax) Adults 19-65 yo with certain risk factors or if 65+ yo  If never received any pneumonia vaccine: recommend Prevnar 20 (PCV20)  Give PCV20 if previously received 1 dose of PCV13 or PPSV23   Hepatitis B Vaccine 3 dose series if at intermediate or high risk (ex: diabetes, end stage renal disease, liver disease)   Respiratory syncytial virus (RSV) Vaccine - COVERED BY MEDICARE PART D  * RSVPreF3 (Arexvy) CDC recommends that adults 60 years of age and older may receive a single dose of RSV vaccine using shared clinical decision-making (SCDM)   Tetanus (Td) Vaccine - COST NOT COVERED BY MEDICARE PART B Following completion of primary series, a booster dose should be given every 10 years to maintain immunity against tetanus. Td may also be given as tetanus wound prophylaxis.   Tdap Vaccine - COST NOT COVERED BY MEDICARE PART B Recommended at least once for all adults. For pregnant patients, recommended with each pregnancy.   Shingles Vaccine (Shingrix) - COST NOT COVERED BY MEDICARE PART B  2 shot series recommended in those 19 years and older who have or will have weakened immune systems or those 50 years and older     Health Maintenance Due:       Topic Date Due   • Breast Cancer Screening: Mammogram  03/17/2024   • Colorectal Cancer Screening  02/13/2029   • Hepatitis C Screening  Completed     Immunizations Due:      Topic Date Due   • COVID-19 Vaccine (7 - 2023-24 season) 01/20/2024     Advance Directives   What are advance directives?  Advance directives are legal documents that state your wishes and plans for medical care. These plans are made ahead of time in case you lose your ability to make decisions for yourself. Advance directives can apply to any medical decision, such as the treatments you want, and if you want to donate organs.   What are the types of advance directives?  There are many types of advance directives, and each state has rules about how to use them. You may choose a combination of any of the following:  Living will:  This is a written record of the treatment you want. You can also choose which treatments you do not want, which to limit, and which to stop at a certain time. This includes surgery, medicine, IV fluid, and tube feedings.   Durable power of  for healthcare (DPAHC):  This is a written record that states who you want to make healthcare choices for you when you are unable to make them for yourself. This person, called a proxy, is usually a family member or a friend. You may choose more than 1 proxy.  Do not resuscitate (DNR) order:  A DNR order is used in case your heart stops beating or you stop breathing. It is a request not to have certain forms of treatment, such as CPR. A DNR order may be included in other types of advance directives.  Medical directive:  This covers the care that you want if you are in a coma, near death, or unable to make decisions for yourself. You can list the treatments you want for each condition. Treatment may include pain medicine, surgery, blood transfusions, dialysis, IV or tube feedings, and a ventilator (breathing machine).  Values history:  This document has questions about your  views, beliefs, and how you feel and think about life. This information can help others choose the care that you would choose.  Why are advance directives important?  An advance directive helps you control your care. Although spoken wishes may be used, it is better to have your wishes written down. Spoken wishes can be misunderstood, or not followed. Treatments may be given even if you do not want them. An advance directive may make it easier for your family to make difficult choices about your care.   Cigarette Smoking and Your Health   Risks to your health if you smoke:  Nicotine and other chemicals found in tobacco damage every cell in your body. Even if you are a light smoker, you have an increased risk for cancer, heart disease, and lung disease. If you are pregnant or have diabetes, smoking increases your risk for complications.   Benefits to your health if you stop smoking:   You decrease respiratory symptoms such as coughing, wheezing, and shortness of breath.   You reduce your risk for cancers of the lung, mouth, throat, kidney, bladder, pancreas, stomach, and cervix. If you already have cancer, you increase the benefits of chemotherapy. You also reduce your risk for cancer returning or a second cancer from developing.   You reduce your risk for heart disease, blood clots, heart attack, and stroke.   You reduce your risk for lung infections, and diseases such as pneumonia, asthma, chronic bronchitis, and emphysema.  Your circulation improves. More oxygen can be delivered to your body. If you have diabetes, you lower your risk for complications, such as kidney, artery, and eye diseases. You also lower your risk for nerve damage. Nerve damage can lead to amputations, poor vision, and blindness.  You improve your body's ability to heal and to fight infections.  For more information and support to stop smoking:   Smokefree.gov  Phone: 5- 983 - 146-9643  Web Address: www.smokefree.gov     © Copyright [employer]  [employer] 2018 Information is for End User's use only and may not be sold, redistributed or otherwise used for commercial purposes. All illustrations and images included in CareNotes® are the copyrighted property of [employer] Inc[employer] or [employer]

## 2024-03-14 NOTE — PROGRESS NOTES
Name: Hyun Anthony      : 1955      MRN: [id number]  Encounter Provider: REDD Wu  Encounter Date: 3/14/2024   Encounter department: [facility]    Assessment & Plan     1. Medicare annual wellness visit, subsequent    2. Pure hypercholesterolemia  Assessment & Plan:  Lab Results   Component Value Date    LDLCALC 113 (H) 2023   On statin. Crestor 10 mg daily.   The 10-year ASCVD risk score (Ariadna BARRETT, et al., 2019) is: 16.7%    Values used to calculate the score:      Age: 68 years      Sex: Female      Is Non- : No      Diabetic: No      Tobacco smoker: Yes      Systolic Blood Pressure: 150 mmHg      Is BP treated: Yes      HDL Cholesterol: 95 mg/dL      Total Cholesterol: 218 mg/dL      Orders:  -     Lipid panel; Future    3. Tobacco use  Assessment & Plan:  Smokes less than 1 ppd. Not tried quitting recently. Is going to start working on cutting down.     Orders:  -     CT lung screening program; Future; Expected date: 2024    4. Essential hypertension  Assessment & Plan:  Monitor blood pressure at home- goal <130/80. If not at goal- will need to change medication.     Orders:  -     TSH, 3rd generation with Free T4 reflex; Future  -     CBC and differential; Future  -     Comprehensive metabolic panel; Future    5. Atherosclerosis of both carotid arteries  Assessment & Plan:  Last checked .   RIGHT:  There is <50% stenosis noted in the internal carotid artery. Plaque is  calcified and smooth.  Vertebral artery flow is antegrade. There is no significant subclavian artery  disease.  LEFT:  There is <50% stenosis noted in the internal carotid artery. Plaque is  calcified and smooth.  Vertebral artery flow is antegrade. There is no significant subclavian artery  disease  Compared to previous study on 11, there is no significant change.    Order to be updated is in the system- has to schedule to complete. On  statin      6. Osteopenia, unspecified location  Assessment & Plan:  Last dxa done 9/2023.   Low bone mass.   A daily intake of calcium of at least 1200 mg and vitamin D, 800-1000 IU, as well as weight bearing and muscle strengthening exercise, fall prevention and avoidance of tobacco and excessive alcohol intake as basic preventive measures are recommended.  3. Repeat DXA scan on the same equipment in 18-24 months as clinically indicated.    Due around September 2025    Orders:  -     Vitamin D 25 hydroxy; Future    7. Systolic ejection murmur  Assessment & Plan:  Complete echo    Orders:  -     Echo complete w/ contrast if indicated; Future; Expected date: 03/14/2024    8. Personal history of nicotine dependence  -     CT lung screening program; Future; Expected date: 03/14/2024    9. Skin lesion      Skin lesion has suspicious findings and should be removed- will return for shave biopsy here or f/u with derm. Regardless does need to have derm full body screening. Update carotid artery ultrasound.   Complete echocardiogram.   Can complete coronary CT as discussed.   Complete low dose CT for lung screening.   Complete labs- these are fasting.   Follow up with dermatology.   Return in 6 moths or sooner if needed.        Subjective      Patient presents with:  Follow-up: 6 month follow up  Labs due.   Still current smoker. Never had lung screening.   Coronary CT screening ordered previously not yet done- patient understands this would be just additional information.   Has history of heart murmur- just recently noted per patient. No history of echo being done.   Monitors BP at home at times- sometimes 150s/90s (like today) and other times 120s/80s.   Sobriety from ETOH 12 months.       Review of Systems   Constitutional:  Negative for chills and fever.   Eyes:  Negative for discharge.   Respiratory:  Negative for shortness of breath.    Cardiovascular:  Negative for chest pain.   Gastrointestinal:  Negative for  "constipation and diarrhea.   Genitourinary:  Negative for difficulty urinating.   Musculoskeletal:  Negative for joint swelling.   Skin:  Negative for rash.   Neurological:  Negative for headaches.   Hematological:  Negative for adenopathy.   Psychiatric/Behavioral:  The patient is not nervous/anxious.        Current Outpatient Medications on File Prior to Visit   Medication Sig    Calcium Carb-Cholecalciferol 600-20 MG-MCG TABS     hydrochlorothiazide (HYDRODIURIL) 12.5 mg tablet TAKE ONE TABLET BY MOUTH ONCE DAILY    lisinopril (ZESTRIL) 20 mg tablet TAKE ONE TABLET BY MOUTH TWICE DAILY    rosuvastatin (CRESTOR) 10 MG tablet TAKE ONE TABLET BY MOUTH ONCE DAILY    EPINEPHrine (EPIPEN) 0.3 mg/0.3 mL SOAJ Inject 0.3 mL (0.3 mg total) into a muscle once for 1 dose    [DISCONTINUED] cholecalciferol (VITAMIN D3) 1,000 units tablet Take 1,000 Units by mouth daily       Objective     /94   Pulse 88   Temp (!) 97 °F (36.1 °C) (Temporal)   Resp 16   Ht 5' 2\" (1.575 m)   Wt 55.6 kg (122 lb 9.6 oz)   SpO2 99%   BMI 22.42 kg/m²     Physical Exam  Vitals and nursing note reviewed.   Constitutional:       General: She is not in acute distress.     Appearance: Normal appearance. She is well-developed. She is not diaphoretic.   HENT:      Head: Normocephalic and atraumatic.      Right Ear: Tympanic membrane, ear canal and external ear normal. There is no impacted cerumen.      Left Ear: Tympanic membrane, ear canal and external ear normal. There is no impacted cerumen.      Nose: Nose normal.      Mouth/Throat:      Mouth: Mucous membranes are moist.      Pharynx: Oropharynx is clear. No oropharyngeal exudate or posterior oropharyngeal erythema.   Eyes:      General: Lids are normal.         Right eye: No discharge.         Left eye: No discharge.      Conjunctiva/sclera: Conjunctivae normal.   Cardiovascular:      Rate and Rhythm: Normal rate and regular rhythm.      Heart sounds: Murmur (I/VI) heard.   Pulmonary:    "   Effort: Pulmonary effort is normal. No respiratory distress.      Breath sounds: Normal breath sounds. No wheezing.   Abdominal:      General: Bowel sounds are normal.      Palpations: Abdomen is soft.   Musculoskeletal:         General: No deformity.   Skin:     General: Skin is warm and dry.      Comments: Skin lesion with atypical appearance left flank- see media. Photos taken   Neurological:      General: No focal deficit present.      Mental Status: She is alert and oriented to person, place, and time. Mental status is at baseline.      Cranial Nerves: No cranial nerve deficit.      Sensory: No sensory deficit.      Motor: No weakness.      Coordination: Coordination normal.      Gait: Gait normal.   Psychiatric:         Speech: Speech normal.         Behavior: Behavior normal.         Thought Content: Thought content normal.         Judgment: Judgment normal.       REDD Wu

## 2024-03-14 NOTE — ASSESSMENT & PLAN NOTE
Lab Results   Component Value Date    LDLCALC 113 (H) 03/24/2023   On statin. Crestor 10 mg daily.   The 10-year ASCVD risk score (Ariadna BARRETT, et al., 2019) is: 16.7%    Values used to calculate the score:      Age: 68 years      Sex: Female      Is Non- : No      Diabetic: No      Tobacco smoker: Yes      Systolic Blood Pressure: 150 mmHg      Is BP treated: Yes      HDL Cholesterol: 95 mg/dL      Total Cholesterol: 218 mg/dL

## 2024-03-14 NOTE — ASSESSMENT & PLAN NOTE
Last dxa done 9/2023.   Low bone mass.   A daily intake of calcium of at least 1200 mg and vitamin D, 800-1000 IU, as well as weight bearing and muscle strengthening exercise, fall prevention and avoidance of tobacco and excessive alcohol intake as basic preventive measures are recommended.  3. Repeat DXA scan on the same equipment in 18-24 months as clinically indicated.    Due around September 2025

## 2024-03-14 NOTE — PROGRESS NOTES
Assessment and Plan:     Problem List Items Addressed This Visit          Cardiovascular and Mediastinum    Essential hypertension     Monitor blood pressure at home- goal <130/80. If not at goal- will need to change medication.          Relevant Orders    TSH, 3rd generation with Free T4 reflex    CBC and differential    Comprehensive metabolic panel    Carotid artery plaque     Last checked 2016.   RIGHT:  There is <50% stenosis noted in the internal carotid artery. Plaque is  calcified and smooth.  Vertebral artery flow is antegrade. There is no significant subclavian artery  disease.  LEFT:  There is <50% stenosis noted in the internal carotid artery. Plaque is  calcified and smooth.  Vertebral artery flow is antegrade. There is no significant subclavian artery  disease  Compared to previous study on 5/11/11, there is no significant change.    Order to be updated is in the system- has to schedule to complete. On statin            Musculoskeletal and Integument    Osteopenia     Last dxa done 9/2023.   Low bone mass.   A daily intake of calcium of at least 1200 mg and vitamin D, 800-1000 IU, as well as weight bearing and muscle strengthening exercise, fall prevention and avoidance of tobacco and excessive alcohol intake as basic preventive measures are recommended.  3. Repeat DXA scan on the same equipment in 18-24 months as clinically indicated.    Due around September 2025         Relevant Orders    Vitamin D 25 hydroxy       Behavioral Health    Tobacco use     Smokes less than 1 ppd. Not tried quitting recently. Is going to start working on cutting down.          Relevant Orders    CT lung screening program       Other    Hyperlipidemia     Lab Results   Component Value Date    LDLCALC 113 (H) 03/24/2023   On statin. Crestor 10 mg daily.   The 10-year ASCVD risk score (Ariadna BARRETT, et al., 2019) is: 16.7%    Values used to calculate the score:      Age: 68 years      Sex: Female      Is Non-   American: No      Diabetic: No      Tobacco smoker: Yes      Systolic Blood Pressure: 150 mmHg      Is BP treated: Yes      HDL Cholesterol: 95 mg/dL      Total Cholesterol: 218 mg/dL           Relevant Orders    Lipid panel    Systolic ejection murmur     Complete echo         Relevant Orders    Echo complete w/ contrast if indicated     Other Visit Diagnoses       Medicare annual wellness visit, subsequent    -  Primary    Personal history of nicotine dependence        Relevant Orders    CT lung screening program    Skin lesion                 Preventive health issues were discussed with patient, and age appropriate screening tests were ordered as noted in patient's After Visit Summary.  Personalized health advice and appropriate referrals for health education or preventive services given if needed, as noted in patient's After Visit Summary.     History of Present Illness:     Patient presents for a Medicare Wellness Visit    HPI   Patient Care Team:  REDD Wu as PCP - General (Nurse Practitioner)  Arnulfo Welsh MD     Review of Systems:     Review of Systems     Problem List:     Patient Active Problem List   Diagnosis    Essential hypertension    Carotid artery plaque    Fibrocystic breast disease    Hyperlipidemia    Systolic ejection murmur    Tobacco use    Smoking    Osteopenia      Past Medical and Surgical History:     Past Medical History:   Diagnosis Date    Carotid artery stenosis     Cyst of breast     Dyspareunia in female     Hyperlipidemia     Hypertension     Personal history of endometriosis     RESOLVED: 12/14/16    Pre-diabetes     LAST ASSESSED: 12/14/16     Past Surgical History:   Procedure Laterality Date    COLONOSCOPY      COMPLETE; [id number] [place]    COLPOSCOPY W/ BIOPSY / CURETTAGE      COLPOSCOPY CERVIX WITH BIOPSY(S) WITH ENDOCERVICAL CURETTAGE; JANUARY 1981, JANUARY 02    ENDOMETRIAL BIOPSY      BY SUCTION; A/24/99 WITH FOCAL CROWDING POSSIBLE SIMPLE  HYPERPLASIA     LAPAROSCOPIC TUBAL LIGATION Bilateral 11/1990 NOVEMBER 1990 WITH BILATERAL TUBAL ALONG WITH ELECTROCOAGULATION OF ENDOMETRIAL IMPLANT IN THE RIGHT UTEROSACRAL LIGAMENT AND RESECTION OF ANTERIOR ABDOMINAL WALL NEVUS     MOUTH SURGERY      THYROGLOSSAL DUCT EXCISION      CYST    TONSILLECTOMY AND ADENOIDECTOMY      TUBAL LIGATION        Family History:     Family History   Problem Relation Age of Onset    Hypertension Mother         BENIGN ESSENTIAL    Stroke Mother         March 26, 2018    Hypertension Father         BENIGN ESSENTIAL    Skin cancer Father     Diverticulosis Father     Diverticulitis Father     Lung cancer Father     Spina bifida Sister     Colon cancer Maternal Grandfather     Hypertension Brother         BENIGN ESSENTIAL    Hyperlipidemia Family     Hypertension Family     Heart attack Family     No Known Problems Paternal Aunt     No Known Problems Paternal Aunt     Breast cancer Neg Hx       Social History:     Social History     Socioeconomic History    Marital status: /Civil Union     Spouse name: None    Number of children: 2    Years of education: None    Highest education level: None   Occupational History    None   Tobacco Use    Smoking status: Every Day     Current packs/day: 1.00     Types: Cigarettes    Smokeless tobacco: Never    Tobacco comments:     TOBACCO USE   Vaping Use    Vaping status: Never Used   Substance and Sexual Activity    Alcohol use: Not Currently     Comment: BEING A SOCIAL DRINKER    Drug use: No    Sexual activity: Yes   Other Topics Concern    None   Social History Narrative    ALWAYS USES SEAT BELT    CAFFEINE USE    Adventism     Social Determinants of Health     Financial Resource Strain: Medium Risk (3/6/2023)    Overall Financial Resource Strain (CARDIA)     Difficulty of Paying Living Expenses: Somewhat hard   Food Insecurity: No Food Insecurity (3/14/2024)    Hunger Vital Sign     Worried About Running Out of Food in the Last  Year: Never true     Ran Out of Food in the Last Year: Never true   Transportation Needs: No Transportation Needs (3/14/2024)    PRAPARE - Transportation     Lack of Transportation (Medical): No     Lack of Transportation (Non-Medical): No   Physical Activity: Not on file   Stress: Not on file   Social Connections: Not on file   Intimate Partner Violence: Not on file   Housing Stability: Unknown (3/14/2024)    Housing Stability Vital Sign     Unable to Pay for Housing in the Last Year: No     Number of Places Lived in the Last Year: Not on file     Unstable Housing in the Last Year: No      Medications and Allergies:     Current Outpatient Medications   Medication Sig Dispense Refill    Calcium Carb-Cholecalciferol 600-20 MG-MCG TABS       hydrochlorothiazide (HYDRODIURIL) 12.5 mg tablet TAKE ONE TABLET BY MOUTH ONCE DAILY 90 tablet 0    lisinopril (ZESTRIL) 20 mg tablet TAKE ONE TABLET BY MOUTH TWICE DAILY 180 tablet 0    rosuvastatin (CRESTOR) 10 MG tablet TAKE ONE TABLET BY MOUTH ONCE DAILY 90 tablet 0    EPINEPHrine (EPIPEN) 0.3 mg/0.3 mL SOAJ Inject 0.3 mL (0.3 mg total) into a muscle once for 1 dose 0.6 mL 0     No current facility-administered medications for this visit.     Allergies   Allergen Reactions    Bee Venom Anaphylaxis, Anxiety, Hives, Itching, Palpitations, Shortness Of Breath, Swelling and Throat Swelling    Amlodipine Myalgia    Percocet  [Oxycodone-Acetaminophen]      Other reaction(s): GI Upset      Immunizations:     Immunization History   Administered Date(s) Administered    COVID-19 PFIZER VACCINE 0.3 ML IM 03/17/2021, 04/07/2021, 11/27/2021, 04/27/2022    COVID-19 Pfizer mRNA vacc PF danyel-sucrose 12 yr and older (Comirnaty) 11/25/2023    COVID-19 Pfizer vac (Danyel-sucrose, gray cap) 12 yr+ IM 11/15/2022    INFLUENZA 11/30/2018, 12/28/2021, 11/01/2023    Influenza Quadrivalent Preservative Free 3 years and older IM 10/30/2014, 12/04/2015    Influenza Quadrivalent, 6-35 Months IM 12/30/2016,  12/19/2017    Influenza, high dose seasonal 0.7 mL 11/28/2022    Influenza, recombinant, quadrivalent,injectable, preservative free 11/30/2018, 11/18/2020    Influenza, seasonal, injectable 12/17/2012, 12/20/2013    Pneumococcal Conjugate 13-Valent 06/06/2016    Pneumococcal Conjugate Vaccine 20-valent (Pcv20), Polysace 03/06/2023    Respiratory Syncytial Virus Vaccine (Recombinant, Adjuvanted) 01/30/2024    Tdap 09/18/2018    Zoster 09/30/2016    Zoster Vaccine Recombinant 04/17/2023, 07/18/2023      Health Maintenance:         Topic Date Due    Breast Cancer Screening: Mammogram  03/17/2024    Colorectal Cancer Screening  02/13/2029    Hepatitis C Screening  Completed         Topic Date Due    COVID-19 Vaccine (7 - 2023-24 season) 01/20/2024      Medicare Screening Tests and Risk Assessments:     Hyun is here for her Subsequent Wellness visit. Last Medicare Wellness visit information reviewed, patient interviewed and updates made to the record today.      Health Risk Assessment:   Patient rates overall health as very good. Patient feels that their physical health rating is same. Patient is very satisfied with their life. Eyesight was rated as same. Hearing was rated as same. Patient feels that their emotional and mental health rating is same. Patients states they are never, rarely angry. Patient states they are never, rarely unusually tired/fatigued. Pain experienced in the last 7 days has been none. Patient states that she has experienced no weight loss or gain in last 6 months.     Depression Screening:   PHQ-2 Score: 0      Fall Risk Screening:   In the past year, patient has experienced: no history of falling in past year      Urinary Incontinence Screening:   Patient has not leaked urine accidently in the last six months.     Home Safety:  Patient does not have trouble with stairs inside or outside of their home. Patient has working smoke alarms and has working carbon monoxide detector. Home safety hazards  include: none.     Nutrition:   Current diet is Regular.     Medications:   Patient is currently taking over-the-counter supplements. OTC medications include: see medication list. Patient is able to manage medications.     Activities of Daily Living (ADLs)/Instrumental Activities of Daily Living (IADLs):   Walk and transfer into and out of bed and chair?: Yes  Dress and groom yourself?: Yes    Bathe or shower yourself?: Yes    Feed yourself? Yes  Do your laundry/housekeeping?: Yes  Manage your money, pay your bills and track your expenses?: Yes  Make your own meals?: Yes    Do your own shopping?: Yes    Previous Hospitalizations:   Any hospitalizations or ED visits within the last 12 months?: No      Advance Care Planning:   Living will: No    Durable POA for healthcare: No    Advanced directive: No    ACP document given: Yes      PREVENTIVE SCREENINGS      Cardiovascular Screening:    General: Screening Not Indicated and History Lipid Disorder      Diabetes Screening:     General: Screening Current and Risks and Benefits Discussed    Due for: Blood Glucose      Colorectal Cancer Screening:     General: Screening Current      Breast Cancer Screening:     General: Screening Current      Cervical Cancer Screening:    General: Screening Not Indicated      Abdominal Aortic Aneurysm (AAA) Screening:        General: Screening Not Indicated      Lung Cancer Screening:     General: Risks and Benefits Discussed    Due for: Low Dose CT (LDCT)      Hepatitis C Screening:    General: Screening Current    Screening, Brief Intervention, and Referral to Treatment (SBIRT)    Screening  Typical number of drinks in a day: 0  Typical number of drinks in a week: 0  Interpretation: Low risk drinking behavior.    Single Item Drug Screening:  How often have you used an illegal drug (including marijuana) or a prescription medication for non-medical reasons in the past year? never    Single Item Drug Screen Score: 0  Interpretation:  "Negative screen for possible drug use disorder    No results found.     Physical Exam:     /94   Pulse 88   Temp (!) 97 °F (36.1 °C) (Temporal)   Resp 16   Ht 5' 2\" (1.575 m)   Wt 55.6 kg (122 lb 9.6 oz)   SpO2 99%   BMI 22.42 kg/m²     Physical Exam     REDD Wu  "

## 2024-03-14 NOTE — ASSESSMENT & PLAN NOTE
Last checked 2016.   RIGHT:  There is <50% stenosis noted in the internal carotid artery. Plaque is  calcified and smooth.  Vertebral artery flow is antegrade. There is no significant subclavian artery  disease.  LEFT:  There is <50% stenosis noted in the internal carotid artery. Plaque is  calcified and smooth.  Vertebral artery flow is antegrade. There is no significant subclavian artery  disease  Compared to previous study on 5/11/11, there is no significant change.    Order to be updated is in the system- has to schedule to complete. On statin

## 2024-04-03 ENCOUNTER — OFFICE VISIT (OUTPATIENT)
Dept: FAMILY MEDICINE CLINIC | Facility: CLINIC | Age: 69
End: 2024-04-03
Payer: COMMERCIAL

## 2024-04-03 VITALS
OXYGEN SATURATION: 98 % | WEIGHT: 121 LBS | BODY MASS INDEX: 22.26 KG/M2 | DIASTOLIC BLOOD PRESSURE: 88 MMHG | TEMPERATURE: 96.5 F | SYSTOLIC BLOOD PRESSURE: 164 MMHG | HEIGHT: 62 IN | HEART RATE: 86 BPM

## 2024-04-03 DIAGNOSIS — J06.9 ACUTE URI: Primary | ICD-10-CM

## 2024-04-03 PROCEDURE — 99214 OFFICE O/P EST MOD 30 MIN: CPT | Performed by: NURSE PRACTITIONER

## 2024-04-03 RX ORDER — BENZONATATE 100 MG/1
100 CAPSULE ORAL 3 TIMES DAILY PRN
Qty: 20 CAPSULE | Refills: 0 | Status: SHIPPED | OUTPATIENT
Start: 2024-04-03

## 2024-04-03 RX ORDER — AMOXICILLIN AND CLAVULANATE POTASSIUM 875; 125 MG/1; MG/1
1 TABLET, FILM COATED ORAL EVERY 12 HOURS SCHEDULED
Qty: 14 TABLET | Refills: 0 | Status: SHIPPED | OUTPATIENT
Start: 2024-04-03 | End: 2024-04-10

## 2024-04-03 RX ORDER — PREDNISONE 20 MG/1
40 TABLET ORAL DAILY
Qty: 6 TABLET | Refills: 0 | Status: SHIPPED | OUTPATIENT
Start: 2024-04-03 | End: 2024-04-06

## 2024-04-03 NOTE — PROGRESS NOTES
Name: Hyun Anthony      : 1955      MRN: [id number]  Encounter Provider: REDD Wu  Encounter Date: 4/3/2024   Encounter department: [facility] PRIMARY CARE    Assessment & Plan     1. Acute URI  -     amoxicillin-clavulanate (AUGMENTIN) 875-125 mg per tablet; Take 1 tablet by mouth every 12 (twelve) hours for 7 days  -     benzonatate (TESSALON PERLES) 100 mg capsule; Take 1 capsule (100 mg total) by mouth 3 (three) times a day as needed for cough  -     predniSONE 20 mg tablet; Take 2 tablets (40 mg total) by mouth daily for 3 days       Start antibiotic, this is Augmentin, this is one pill twice a day for 7 days. Please take this medication with food as it may upset your stomach. Please review hand out on medication from pharmacy and call if you experience any side effects or have any questions.   Start cough medication, this is the Tessalon perles. One pill every 8 hours as needed for cough.   Start prednisone, this is the steroid. 40mg daily for 3 days. Take with food. It's better to take it earlier in the day than later as it could keep you up at night. Do not mix with NSAIDs such as aleve, ibuprofen, advil, motrin.   Stay well hydrated. Monitor blood pressure. Continue with zyrtec.  Please call the office if you are experiencing any worsening of symptoms or no symptom improvement.   Subjective      Patient presents with:  Cold Like Symptoms: Started last Wednesday, having continuous post nasal drip, cough   Negative home COVID on Saturday. Has been using mucinex for symptoms, which doesn't help much. She started zyrtec without much benefit.           Review of Systems   Constitutional:  Positive for fatigue. Negative for chills and fever.   HENT:  Positive for congestion, postnasal drip, rhinorrhea and sore throat.    Eyes:  Negative for discharge.   Respiratory:  Positive for cough. Negative for shortness of breath.    Cardiovascular:  Negative for chest pain.  "  Gastrointestinal:  Negative for constipation and diarrhea.   Genitourinary:  Negative for difficulty urinating.   Musculoskeletal:  Positive for arthralgias (from coughing). Negative for joint swelling.   Skin:  Negative for rash.   Neurological:  Negative for headaches.   Hematological:  Negative for adenopathy.   Psychiatric/Behavioral:  The patient is not nervous/anxious.        Current Outpatient Medications on File Prior to Visit   Medication Sig   • Calcium Carb-Cholecalciferol 600-20 MG-MCG TABS    • hydrochlorothiazide (HYDRODIURIL) 12.5 mg tablet TAKE ONE TABLET BY MOUTH ONCE DAILY   • lisinopril (ZESTRIL) 20 mg tablet TAKE ONE TABLET BY MOUTH TWICE DAILY   • rosuvastatin (CRESTOR) 10 MG tablet TAKE ONE TABLET BY MOUTH ONCE DAILY   • EPINEPHrine (EPIPEN) 0.3 mg/0.3 mL SOAJ Inject 0.3 mL (0.3 mg total) into a muscle once for 1 dose       Objective     /88 (BP Location: Left arm, Patient Position: Sitting, Cuff Size: Adult)   Pulse 86   Temp (!) 96.5 °F (35.8 °C) (Temporal)   Ht 5' 2\" (1.575 m)   Wt 54.9 kg (121 lb)   SpO2 98%   BMI 22.13 kg/m²     Physical Exam  Vitals and nursing note reviewed.   Constitutional:       General: She is not in acute distress.     Appearance: Normal appearance. She is well-developed. She is not diaphoretic.   HENT:      Head: Normocephalic and atraumatic.      Right Ear: Ear canal and external ear normal. A middle ear effusion is present. There is no impacted cerumen. Tympanic membrane is not erythematous, retracted or bulging.      Left Ear: Ear canal and external ear normal. A middle ear effusion is present. There is no impacted cerumen. Tympanic membrane is not erythematous, retracted or bulging.      Nose: Congestion and rhinorrhea present.      Mouth/Throat:      Pharynx: Posterior oropharyngeal erythema present. No oropharyngeal exudate.   Eyes:      General: Lids are normal.         Right eye: No discharge.         Left eye: No discharge.      " Conjunctiva/sclera: Conjunctivae normal.   Cardiovascular:      Rate and Rhythm: Normal rate and regular rhythm.      Heart sounds: No murmur heard.  Pulmonary:      Effort: Pulmonary effort is normal. No respiratory distress.      Breath sounds: Normal breath sounds. No wheezing.   Musculoskeletal:         General: No deformity.   Skin:     General: Skin is warm and dry.   Neurological:      General: No focal deficit present.      Mental Status: She is alert and oriented to person, place, and time.   Psychiatric:         Speech: Speech normal.         Behavior: Behavior normal.         Thought Content: Thought content normal.         Judgment: Judgment normal.       REDD Wu

## 2024-04-03 NOTE — PATIENT INSTRUCTIONS
Start antibiotic, this is Augmentin, this is one pill twice a day for 7 days. Please take this medication with food as it may upset your stomach. Please review hand out on medication from pharmacy and call if you experience any side effects or have any questions.     Start cough medication, this is the Tessalon perles. One pill every 8 hours as needed for cough.     Start prednisone, this is the steroid. 40mg daily for 3 days. Take with food. It's better to take it earlier in the day than later as it could keep you up at night. Do not mix with NSAIDs such as aleve, ibuprofen, advil, motrin.     Stay well hydrated. Monitor blood pressure.     Please call the office if you are experiencing any worsening of symptoms or no symptom improvement.

## 2024-04-05 ENCOUNTER — RA CDI HCC (OUTPATIENT)
Dept: OTHER | Facility: HOSPITAL | Age: 69
End: 2024-04-05

## 2024-04-22 DIAGNOSIS — I10 ESSENTIAL HYPERTENSION: ICD-10-CM

## 2024-04-22 DIAGNOSIS — E78.00 PURE HYPERCHOLESTEROLEMIA: ICD-10-CM

## 2024-04-23 RX ORDER — ROSUVASTATIN CALCIUM 10 MG/1
10 TABLET, COATED ORAL DAILY
Qty: 30 TABLET | Refills: 0 | Status: SHIPPED | OUTPATIENT
Start: 2024-04-23

## 2024-04-23 RX ORDER — LISINOPRIL 20 MG/1
TABLET ORAL
Qty: 60 TABLET | Refills: 0 | Status: SHIPPED | OUTPATIENT
Start: 2024-04-23

## 2024-04-23 NOTE — TELEPHONE ENCOUNTER
Patient needs updated blood work and has previously placed orders. Please contact patient to go for labs. Courtesy refill provided.

## 2024-04-30 DIAGNOSIS — I10 BENIGN ESSENTIAL HYPERTENSION: ICD-10-CM

## 2024-05-01 RX ORDER — HYDROCHLOROTHIAZIDE 12.5 MG/1
12.5 TABLET ORAL DAILY
Qty: 90 TABLET | Refills: 0 | Status: SHIPPED | OUTPATIENT
Start: 2024-05-01

## 2024-09-09 ENCOUNTER — VBI (OUTPATIENT)
Dept: ADMINISTRATIVE | Facility: OTHER | Age: 69
End: 2024-09-09

## 2024-09-09 NOTE — TELEPHONE ENCOUNTER
09/09/24 3:47 PM    Patient was flagged by a Third Party Payer report as being due for a refill on the following medications, Rosuvastatin 10 mg. per patient she was not taken med everyday as directed whenever she had office meeting, therefore she still has medication left. She will request refills at her upcoming appt.    Thank you.  Arin Rodriguez  [facility]

## 2024-09-09 NOTE — TELEPHONE ENCOUNTER
09/09/24 3:11 PM    Patient was flagged by a Third Party Payer report as being due for a refill on the following medications, Rosuvastatin 10 mg. Patient was contacted to review these medications. There was no answer and a message was left.     Thank you.  Arin Rodriguez  [facility]

## 2024-09-27 ENCOUNTER — APPOINTMENT (OUTPATIENT)
Dept: LAB | Facility: CLINIC | Age: 69
End: 2024-09-27
Payer: COMMERCIAL

## 2024-09-27 DIAGNOSIS — E78.00 PURE HYPERCHOLESTEROLEMIA: ICD-10-CM

## 2024-09-27 DIAGNOSIS — I10 ESSENTIAL HYPERTENSION: ICD-10-CM

## 2024-09-27 DIAGNOSIS — M85.80 OSTEOPENIA, UNSPECIFIED LOCATION: ICD-10-CM

## 2024-09-27 PROCEDURE — 36415 COLL VENOUS BLD VENIPUNCTURE: CPT

## 2024-09-27 PROCEDURE — 84443 ASSAY THYROID STIM HORMONE: CPT

## 2024-09-27 PROCEDURE — 82306 VITAMIN D 25 HYDROXY: CPT

## 2024-09-27 PROCEDURE — 80053 COMPREHEN METABOLIC PANEL: CPT

## 2024-09-27 PROCEDURE — 80061 LIPID PANEL: CPT

## 2024-09-27 PROCEDURE — 85025 COMPLETE CBC W/AUTO DIFF WBC: CPT

## 2024-09-28 LAB
25(OH)D3 SERPL-MCNC: 28.1 NG/ML (ref 30–100)
ALBUMIN SERPL BCG-MCNC: 4.5 G/DL (ref 3.5–5)
ALP SERPL-CCNC: 85 U/L (ref 34–104)
ALT SERPL W P-5'-P-CCNC: 13 U/L (ref 7–52)
ANION GAP SERPL CALCULATED.3IONS-SCNC: 6 MMOL/L (ref 4–13)
AST SERPL W P-5'-P-CCNC: 19 U/L (ref 13–39)
BASOPHILS # BLD AUTO: 0.05 THOUSANDS/ΜL (ref 0–0.1)
BASOPHILS NFR BLD AUTO: 1 % (ref 0–1)
BILIRUB SERPL-MCNC: 0.62 MG/DL (ref 0.2–1)
BUN SERPL-MCNC: 12 MG/DL (ref 5–25)
CALCIUM SERPL-MCNC: 9.6 MG/DL (ref 8.4–10.2)
CHLORIDE SERPL-SCNC: 98 MMOL/L (ref 96–108)
CHOLEST SERPL-MCNC: 226 MG/DL
CO2 SERPL-SCNC: 27 MMOL/L (ref 21–32)
CREAT SERPL-MCNC: 0.6 MG/DL (ref 0.6–1.3)
EOSINOPHIL # BLD AUTO: 0.07 THOUSAND/ΜL (ref 0–0.61)
EOSINOPHIL NFR BLD AUTO: 1 % (ref 0–6)
ERYTHROCYTE [DISTWIDTH] IN BLOOD BY AUTOMATED COUNT: 12.9 % (ref 11.6–15.1)
GFR SERPL CREATININE-BSD FRML MDRD: 95 ML/MIN/1.73SQ M
GLUCOSE P FAST SERPL-MCNC: 91 MG/DL (ref 65–99)
HCT VFR BLD AUTO: 40.2 % (ref 34.8–46.1)
HDLC SERPL-MCNC: 99 MG/DL
HGB BLD-MCNC: 13.5 G/DL (ref 11.5–15.4)
IMM GRANULOCYTES # BLD AUTO: 0.02 THOUSAND/UL (ref 0–0.2)
IMM GRANULOCYTES NFR BLD AUTO: 0 % (ref 0–2)
LDLC SERPL CALC-MCNC: 118 MG/DL (ref 0–100)
LYMPHOCYTES # BLD AUTO: 3.12 THOUSANDS/ΜL (ref 0.6–4.47)
LYMPHOCYTES NFR BLD AUTO: 38 % (ref 14–44)
MCH RBC QN AUTO: 32.3 PG (ref 26.8–34.3)
MCHC RBC AUTO-ENTMCNC: 33.6 G/DL (ref 31.4–37.4)
MCV RBC AUTO: 96 FL (ref 82–98)
MONOCYTES # BLD AUTO: 0.55 THOUSAND/ΜL (ref 0.17–1.22)
MONOCYTES NFR BLD AUTO: 7 % (ref 4–12)
NEUTROPHILS # BLD AUTO: 4.44 THOUSANDS/ΜL (ref 1.85–7.62)
NEUTS SEG NFR BLD AUTO: 53 % (ref 43–75)
NONHDLC SERPL-MCNC: 127 MG/DL
NRBC BLD AUTO-RTO: 0 /100 WBCS
PLATELET # BLD AUTO: 225 THOUSANDS/UL (ref 149–390)
PMV BLD AUTO: 11.1 FL (ref 8.9–12.7)
POTASSIUM SERPL-SCNC: 4.4 MMOL/L (ref 3.5–5.3)
PROT SERPL-MCNC: 6.7 G/DL (ref 6.4–8.4)
RBC # BLD AUTO: 4.18 MILLION/UL (ref 3.81–5.12)
SODIUM SERPL-SCNC: 131 MMOL/L (ref 135–147)
TRIGL SERPL-MCNC: 46 MG/DL
TSH SERPL DL<=0.05 MIU/L-ACNC: 0.82 UIU/ML (ref 0.45–4.5)
WBC # BLD AUTO: 8.25 THOUSAND/UL (ref 4.31–10.16)

## 2024-10-01 ENCOUNTER — OFFICE VISIT (OUTPATIENT)
Dept: FAMILY MEDICINE CLINIC | Facility: CLINIC | Age: 69
End: 2024-10-01
Payer: COMMERCIAL

## 2024-10-01 VITALS
DIASTOLIC BLOOD PRESSURE: 82 MMHG | WEIGHT: 121 LBS | OXYGEN SATURATION: 99 % | SYSTOLIC BLOOD PRESSURE: 124 MMHG | HEIGHT: 62 IN | RESPIRATION RATE: 16 BRPM | TEMPERATURE: 98.1 F | BODY MASS INDEX: 22.26 KG/M2 | HEART RATE: 82 BPM

## 2024-10-01 DIAGNOSIS — E87.1 HYPONATREMIA: ICD-10-CM

## 2024-10-01 DIAGNOSIS — Z12.31 ENCOUNTER FOR SCREENING MAMMOGRAM FOR MALIGNANT NEOPLASM OF BREAST: ICD-10-CM

## 2024-10-01 DIAGNOSIS — I10 ESSENTIAL HYPERTENSION: ICD-10-CM

## 2024-10-01 DIAGNOSIS — E78.00 PURE HYPERCHOLESTEROLEMIA: Primary | ICD-10-CM

## 2024-10-01 DIAGNOSIS — E55.9 VITAMIN D DEFICIENCY: ICD-10-CM

## 2024-10-01 DIAGNOSIS — R01.1 SYSTOLIC EJECTION MURMUR: ICD-10-CM

## 2024-10-01 PROCEDURE — 99214 OFFICE O/P EST MOD 30 MIN: CPT | Performed by: NURSE PRACTITIONER

## 2024-10-01 NOTE — ASSESSMENT & PLAN NOTE
Lab Results   Component Value Date    LDLCALC 118 (H) 09/27/2024   On statin. Crestor 10 mg daily.   The 10-year ASCVD risk score (Ariadna BARRETT, et al., 2019) is: 11.7%    Values used to calculate the score:      Age: 68 years      Sex: Female      Is Non- : No      Diabetic: No      Tobacco smoker: Yes      Systolic Blood Pressure: 124 mmHg      Is BP treated: Yes      HDL Cholesterol: 99 mg/dL      Total Cholesterol: 226 mg/dL      Orders:    Lipid panel; Future

## 2024-10-01 NOTE — PROGRESS NOTES
Ambulatory Visit  Name: Hyun Anthony      : 1955      MRN: [id number]  Encounter Provider: REDD uW  Encounter Date: 10/1/2024   Encounter department: [facility] PRIMARY CARE    Assessment & Plan  Vitamin D deficiency  Will add on additional supplementation.   Orders:    Vitamin D 25 hydroxy; Future    Pure hypercholesterolemia  Lab Results   Component Value Date    LDLCALC 118 (H) 2024   On statin. Crestor 10 mg daily.   The 10-year ASCVD risk score (Ariadna BARRETT, et al., 2019) is: 11.7%    Values used to calculate the score:      Age: 68 years      Sex: Female      Is Non- : No      Diabetic: No      Tobacco smoker: Yes      Systolic Blood Pressure: 124 mmHg      Is BP treated: Yes      HDL Cholesterol: 99 mg/dL      Total Cholesterol: 226 mg/dL      Orders:    Lipid panel; Future    Systolic ejection murmur  Complete echo.        Essential hypertension  Monitor blood pressure at home- goal <130/80.  Average at home 130s/80s.       Orders:    Comprehensive metabolic panel; Future    Hyponatremia  Likely related to HCTZ. If no improving with more hydration effort (has been lacking per patient) then will change medication.     Orders:    Comprehensive metabolic panel; Future    Encounter for screening mammogram for malignant neoplasm of breast    Orders:    Mammo screening bilateral w 3d and cad; Future       History of Present Illness     Patient presents the office today for 6-month follow-up.    At last visit echo monitor to investigate systolic murmur.  This was not yet completed  Lung screening ordered was also not yet completed.  Also has carotid artery ultrasound to be completed    She did have her blood work done that showed vitamin D insufficiency.  Hyponatremia which has been ongoing for patient.  Patient is on hydrochlorothiazide and lisinopril daily for blood pressure.  Chronic hyponatremia may be related to hydrochlorothiazide.  "    Lipid panel shows LDL of 118.  Managed on Crestor 10 mg daily.  Thyroid was normal.    Quit drinking 18 months ago. No ETOH use.           Review of Systems   Constitutional:  Negative for chills and fever.   Eyes:  Negative for discharge.   Respiratory:  Negative for shortness of breath.    Cardiovascular:  Negative for chest pain.   Gastrointestinal:  Negative for constipation and diarrhea.   Genitourinary:  Negative for difficulty urinating.   Musculoskeletal:  Negative for joint swelling.   Skin:  Negative for rash.   Neurological:  Negative for headaches.   Hematological:  Negative for adenopathy.   Psychiatric/Behavioral:  The patient is not nervous/anxious.            Objective     /82 (BP Location: Left arm, Patient Position: Sitting, Cuff Size: Standard)   Pulse 82   Temp 98.1 °F (36.7 °C) (Temporal)   Resp 16   Ht 5' 2\" (1.575 m)   Wt 54.9 kg (121 lb)   SpO2 99%   BMI 22.13 kg/m²     Physical Exam  Vitals and nursing note reviewed.   Constitutional:       General: She is not in acute distress.     Appearance: Normal appearance. She is well-developed. She is not diaphoretic.   HENT:      Head: Normocephalic and atraumatic.      Right Ear: External ear normal.      Left Ear: External ear normal.   Eyes:      General: Lids are normal.         Right eye: No discharge.         Left eye: No discharge.      Conjunctiva/sclera: Conjunctivae normal.   Cardiovascular:      Rate and Rhythm: Normal rate and regular rhythm.      Heart sounds: Murmur heard.   Pulmonary:      Effort: Pulmonary effort is normal. No respiratory distress.      Breath sounds: Normal breath sounds. No wheezing.   Musculoskeletal:         General: No deformity.   Skin:     General: Skin is warm and dry.   Neurological:      General: No focal deficit present.      Mental Status: She is alert and oriented to person, place, and time.   Psychiatric:         Speech: Speech normal.         Behavior: Behavior normal.         Thought " Content: Thought content normal.         Judgment: Judgment normal.

## 2024-10-01 NOTE — ASSESSMENT & PLAN NOTE
Likely related to HCTZ. If no improving with more hydration effort (has been lacking per patient) then will change medication.     Orders:    Comprehensive metabolic panel; Future

## 2024-10-01 NOTE — PATIENT INSTRUCTIONS
Stay hydrated- will recheck sodium. If not improving could consider changing HCTZ to another alternative.     Continue to monitor blood pressure at home. Goal <130/80.     Please call the office if you are experiencing any worsening of symptoms or no symptom improvement.

## 2024-10-01 NOTE — ASSESSMENT & PLAN NOTE
Monitor blood pressure at home- goal <130/80.  Average at home 130s/80s.       Orders:    Comprehensive metabolic panel; Future

## 2024-12-02 ENCOUNTER — VBI (OUTPATIENT)
Dept: ADMINISTRATIVE | Facility: OTHER | Age: 69
End: 2024-12-02

## 2024-12-02 NOTE — TELEPHONE ENCOUNTER
12/02/24 9:52 AM     Chart reviewed for BP was/were submitted to the patient's insurance.     Darryl Gregg MA   [facility]

## 2025-02-18 DIAGNOSIS — E78.00 PURE HYPERCHOLESTEROLEMIA: ICD-10-CM

## 2025-02-19 RX ORDER — ROSUVASTATIN CALCIUM 10 MG/1
10 TABLET, COATED ORAL DAILY
Qty: 30 TABLET | Refills: 0 | Status: SHIPPED | OUTPATIENT
Start: 2025-02-19

## 2025-03-25 ENCOUNTER — RA CDI HCC (OUTPATIENT)
Dept: OTHER | Facility: HOSPITAL | Age: 70
End: 2025-03-25

## 2025-04-01 ENCOUNTER — OFFICE VISIT (OUTPATIENT)
Dept: FAMILY MEDICINE CLINIC | Facility: CLINIC | Age: 70
End: 2025-04-01
Payer: COMMERCIAL

## 2025-04-01 VITALS
DIASTOLIC BLOOD PRESSURE: 80 MMHG | SYSTOLIC BLOOD PRESSURE: 130 MMHG | OXYGEN SATURATION: 99 % | BODY MASS INDEX: 22.53 KG/M2 | WEIGHT: 123.2 LBS | HEART RATE: 88 BPM | TEMPERATURE: 96.8 F

## 2025-04-01 DIAGNOSIS — R01.1 SYSTOLIC EJECTION MURMUR: ICD-10-CM

## 2025-04-01 DIAGNOSIS — I10 ESSENTIAL HYPERTENSION: ICD-10-CM

## 2025-04-01 DIAGNOSIS — Z72.0 TOBACCO USE: ICD-10-CM

## 2025-04-01 DIAGNOSIS — Z00.00 MEDICARE ANNUAL WELLNESS VISIT, SUBSEQUENT: Primary | ICD-10-CM

## 2025-04-01 DIAGNOSIS — I65.23 ATHEROSCLEROSIS OF BOTH CAROTID ARTERIES: ICD-10-CM

## 2025-04-01 DIAGNOSIS — E78.00 PURE HYPERCHOLESTEROLEMIA: ICD-10-CM

## 2025-04-01 DIAGNOSIS — F17.210 NICOTINE DEPENDENCE, CIGARETTES, UNCOMPLICATED: ICD-10-CM

## 2025-04-01 PROCEDURE — G2211 COMPLEX E/M VISIT ADD ON: HCPCS | Performed by: NURSE PRACTITIONER

## 2025-04-01 PROCEDURE — G0439 PPPS, SUBSEQ VISIT: HCPCS | Performed by: NURSE PRACTITIONER

## 2025-04-01 PROCEDURE — 99214 OFFICE O/P EST MOD 30 MIN: CPT | Performed by: NURSE PRACTITIONER

## 2025-04-01 RX ORDER — LISINOPRIL 20 MG/1
20 TABLET ORAL 2 TIMES DAILY
Qty: 180 TABLET | Refills: 1 | Status: SHIPPED | OUTPATIENT
Start: 2025-04-01

## 2025-04-01 RX ORDER — ROSUVASTATIN CALCIUM 10 MG/1
10 TABLET, COATED ORAL DAILY
Qty: 90 TABLET | Refills: 1 | Status: SHIPPED | OUTPATIENT
Start: 2025-04-01

## 2025-04-01 RX ORDER — HYDROCHLOROTHIAZIDE 12.5 MG/1
12.5 TABLET ORAL DAILY
Qty: 90 TABLET | Refills: 1 | Status: SHIPPED | OUTPATIENT
Start: 2025-04-01

## 2025-04-01 NOTE — ASSESSMENT & PLAN NOTE
Last checked 2016.   RIGHT:  There is <50% stenosis noted in the internal carotid artery. Plaque is  calcified and smooth.  Vertebral artery flow is antegrade. There is no significant subclavian artery  disease.  LEFT:  There is <50% stenosis noted in the internal carotid artery. Plaque is  calcified and smooth.  Vertebral artery flow is antegrade. There is no significant subclavian artery  disease  Compared to previous study on 5/11/11, there is no significant change.     Order to be updated is in the system- has to schedule to complete. On statin  Orders:  •  VAS carotid complete study; Future

## 2025-04-01 NOTE — ASSESSMENT & PLAN NOTE
Encouraged to complete echo- previously ordered. Order updated.   Orders:  •  Echo complete w/ contrast if indicated; Future

## 2025-04-01 NOTE — ASSESSMENT & PLAN NOTE
Monitor blood pressure at home- goal <130/80.  Average at home 130s/80s.   Asymptomatic.   Orders:  •  lisinopril (ZESTRIL) 20 mg tablet; Take 1 tablet (20 mg total) by mouth 2 (two) times a day  •  hydroCHLOROthiazide 12.5 mg tablet; Take 1 tablet (12.5 mg total) by mouth daily

## 2025-04-01 NOTE — ASSESSMENT & PLAN NOTE
I discussed with her that she is a candidate for lung cancer CT screening.     The following Shared Decision-Making points were covered:  Benefits of screening were discussed, including the rates of reduction in death from lung cancer and other causes.  Harms of screening were reviewed, including false positive tests, radiation exposure levels, risks of invasive procedures, risks of complications of screening, and risk of overdiagnosis.  I counseled on the importance of adherence to annual lung cancer LDCT screening, impact of co-morbidities, and ability or willingness to undergo diagnosis and treatment.  I counseled on the importance of maintaining abstinence as a former smoker or was counseled on the importance of smoking cessation if a current smoker    Review of Eligibility Criteria: She meets all of the criteria for Lung Cancer Screening.   She is 69 y.o.   She has 20 pack year tobacco history and is a current smoker or has quit within the past 15 years  She presents no signs or symptoms of lung cancer    After discussion, the patient decided to elect lung cancer screening.    Orders:  •  CT lung screening program; Future

## 2025-04-01 NOTE — ASSESSMENT & PLAN NOTE
Lab Results   Component Value Date    LDLCALC 118 (H) 09/27/2024   On statin. Crestor 10 mg daily.   The 10-year ASCVD risk score (Ariadna BARRETT, et al., 2019) is: 14.2%    Values used to calculate the score:      Age: 69 years      Sex: Female      Is Non- : No      Diabetic: No      Tobacco smoker: Yes      Systolic Blood Pressure: 130 mmHg      Is BP treated: Yes      HDL Cholesterol: 99 mg/dL      Total Cholesterol: 226 mg/dL          Orders:  •  rosuvastatin (CRESTOR) 10 MG tablet; Take 1 tablet (10 mg total) by mouth daily

## 2025-04-01 NOTE — PATIENT INSTRUCTIONS
Complete labs.     Complete echo.     Complete mammogram.     Complete lung screening.     Complete venous duplex.     Please call the office if you are experiencing any worsening of symptoms or no symptom improvement.

## 2025-04-01 NOTE — PROGRESS NOTES
Name: Hyun Anthony      : 1955      MRN: [id number]  Encounter Provider: REDD Wu  Encounter Date: 2025   Encounter department: [facility]    Assessment & Plan  Medicare annual wellness visit, subsequent  Immunizations up-to-date.  Mammogram reordered for her to complete.  Advance directive information provided       Pure hypercholesterolemia  Lab Results   Component Value Date    LDLCALC 118 (H) 2024   On statin. Crestor 10 mg daily.   The 10-year ASCVD risk score (Ariadna BARRETT, et al., 2019) is: 14.2%    Values used to calculate the score:      Age: 69 years      Sex: Female      Is Non- : No      Diabetic: No      Tobacco smoker: Yes      Systolic Blood Pressure: 130 mmHg      Is BP treated: Yes      HDL Cholesterol: 99 mg/dL      Total Cholesterol: 226 mg/dL          Orders:  •  rosuvastatin (CRESTOR) 10 MG tablet; Take 1 tablet (10 mg total) by mouth daily    Essential hypertension  Monitor blood pressure at home- goal <130/80.  Average at home 130s/80s.   Asymptomatic.   Orders:  •  lisinopril (ZESTRIL) 20 mg tablet; Take 1 tablet (20 mg total) by mouth 2 (two) times a day  •  hydroCHLOROthiazide 12.5 mg tablet; Take 1 tablet (12.5 mg total) by mouth daily    Systolic ejection murmur  Encouraged to complete echo- previously ordered. Order updated.   Orders:  •  Echo complete w/ contrast if indicated; Future    Tobacco use  I discussed with her that she is a candidate for lung cancer CT screening.     The following Shared Decision-Making points were covered:  Benefits of screening were discussed, including the rates of reduction in death from lung cancer and other causes.  Harms of screening were reviewed, including false positive tests, radiation exposure levels, risks of invasive procedures, risks of complications of screening, and risk of overdiagnosis.  I counseled on the importance of adherence to annual lung cancer LDCT  screening, impact of co-morbidities, and ability or willingness to undergo diagnosis and treatment.  I counseled on the importance of maintaining abstinence as a former smoker or was counseled on the importance of smoking cessation if a current smoker    Review of Eligibility Criteria: She meets all of the criteria for Lung Cancer Screening.   She is 69 y.o.   She has 20 pack year tobacco history and is a current smoker or has quit within the past 15 years  She presents no signs or symptoms of lung cancer    After discussion, the patient decided to elect lung cancer screening.    Orders:  •  CT lung screening program; Future    Atherosclerosis of both carotid arteries  Last checked 2016.   RIGHT:  There is <50% stenosis noted in the internal carotid artery. Plaque is  calcified and smooth.  Vertebral artery flow is antegrade. There is no significant subclavian artery  disease.  LEFT:  There is <50% stenosis noted in the internal carotid artery. Plaque is  calcified and smooth.  Vertebral artery flow is antegrade. There is no significant subclavian artery  disease  Compared to previous study on 5/11/11, there is no significant change.     Order to be updated is in the system- has to schedule to complete. On statin  Orders:  •  VAS carotid complete study; Future    Nicotine dependence, cigarettes, uncomplicated    Orders:  •  CT lung screening program; Future       Preventive health issues were discussed with patient, and age appropriate screening tests were ordered as noted in patient's After Visit Summary. Personalized health advice and appropriate referrals for health education or preventive services given if needed, as noted in patient's After Visit Summary.    History of Present Illness     Patient presents the office today for wellness visit and follow-up.  Patient currently managed on medication for HTN. current smoker.  Did patient was noted to have murmur at previous visit March 2024.  Echo was ordered but  not yet completed by patient.  Repeat labs were ordered for today but also not yet completed.  This would be the vitamin D metabolic panel and lipid panel due.  Mammogram previously ordered to be completed.  Overall feeling well.       Patient Care Team:  REDD Wu as PCP - General (Nurse Practitioner)  Arnulfo Welsh MD    Review of Systems   Constitutional:  Negative for chills and fever.   Eyes:  Negative for discharge.   Respiratory:  Negative for shortness of breath.    Cardiovascular:  Negative for chest pain.   Gastrointestinal:  Negative for constipation and diarrhea.   Genitourinary:  Negative for difficulty urinating.   Musculoskeletal:  Negative for joint swelling.   Skin:  Negative for rash.   Neurological:  Negative for headaches.   Hematological:  Negative for adenopathy.   Psychiatric/Behavioral:  The patient is not nervous/anxious.      Medical History Reviewed by provider this encounter:  Tobacco  Allergies  Meds  Problems  Med Hx  Surg Hx  Fam Hx       Annual Wellness Visit Questionnaire   Hyun is here for her Subsequent Wellness visit. Last Medicare Wellness visit information reviewed, patient interviewed and updates made to the record today.      Health Risk Assessment:   Patient rates overall health as very good. Patient feels that their physical health rating is same. Patient is satisfied with their life. Eyesight was rated as same. Hearing was rated as same. Patient feels that their emotional and mental health rating is same. Patients states they are never, rarely angry. Patient states they are never, rarely unusually tired/fatigued. Pain experienced in the last 7 days has been none. Patient states that she has experienced no weight loss or gain in last 6 months.     Depression Screening:   PHQ-2 Score: 0      Fall Risk Screening:   In the past year, patient has experienced: no history of falling in past year      Urinary Incontinence Screening:   Patient has not leaked  urine accidently in the last six months.     Home Safety:  Patient does not have trouble with stairs inside or outside of their home. Patient has working smoke alarms and has working carbon monoxide detector. Home safety hazards include: none.     Nutrition:   Current diet is Regular.     Medications:   Patient is currently taking over-the-counter supplements. OTC medications include: see medication list. Patient is able to manage medications.     Activities of Daily Living (ADLs)/Instrumental Activities of Daily Living (IADLs):   Walk and transfer into and out of bed and chair?: Yes  Dress and groom yourself?: Yes    Bathe or shower yourself?: Yes    Feed yourself? Yes  Do your laundry/housekeeping?: Yes  Manage your money, pay your bills and track your expenses?: Yes  Make your own meals?: Yes    Do your own shopping?: Yes    Previous Hospitalizations:   Any hospitalizations or ED visits within the last 12 months?: No      Advance Care Planning:   Living will: No    Durable POA for healthcare: No    Advanced directive: No    Advanced directive counseling given: Yes      PREVENTIVE SCREENINGS      Cardiovascular Screening:    General: Screening Not Indicated and History Lipid Disorder    Due for: Lipid Panel      Diabetes Screening:     General: Screening Current      Colorectal Cancer Screening:     General: Screening Current      Breast Cancer Screening:     General: Risks and Benefits Discussed    Due for: Mammogram        Cervical Cancer Screening:    General: Screening Not Indicated      Osteoporosis Screening:    General: Screening Current      Abdominal Aortic Aneurysm (AAA) Screening:        General: Screening Not Indicated      Lung Cancer Screening:     General: Screening Not Indicated      Hepatitis C Screening:    General: Screening Current    Screening, Brief Intervention, and Referral to Treatment (SBIRT)     Screening  Typical number of drinks in a day: 0  Typical number of drinks in a week:  0  Interpretation: Low risk drinking behavior.    AUDIT-C Screenin) How often did you have a drink containing alcohol in the past year? never  2) How many drinks did you have on a typical day when you were drinking in the past year? 0  3) How often did you have 6 or more drinks on one occasion in the past year? never    AUDIT-C Score: 0  Interpretation: Score 0-2 (female): Negative screen for alcohol misuse    Single Item Drug Screening:  How often have you used an illegal drug (including marijuana) or a prescription medication for non-medical reasons in the past year? never    Single Item Drug Screen Score: 0  Interpretation: Negative screen for possible drug use disorder    Social Drivers of Health     Financial Resource Strain: Medium Risk (3/6/2023)    Overall Financial Resource Strain (CARDIA)    • Difficulty of Paying Living Expenses: Somewhat hard   Food Insecurity: No Food Insecurity (2025)    Hunger Vital Sign    • Worried About Running Out of Food in the Last Year: Never true    • Ran Out of Food in the Last Year: Never true   Transportation Needs: No Transportation Needs (2025)    PRAPARE - Transportation    • Lack of Transportation (Medical): No    • Lack of Transportation (Non-Medical): No   Housing Stability: Unknown (2025)    Housing Stability Vital Sign    • Unable to Pay for Housing in the Last Year: No    • Homeless in the Last Year: No   Utilities: Not At Risk (2025)    [facility] Utilities    • Threatened with loss of utilities: No     No results found.    Objective   /80 Comment: home reading  Pulse 88   Temp (!) 96.8 °F (36 °C) (Temporal)   Wt 55.9 kg (123 lb 3.2 oz)   SpO2 99%   BMI 22.53 kg/m²     Physical Exam  Vitals and nursing note reviewed.   Constitutional:       General: She is not in acute distress.     Appearance: Normal appearance. She is not ill-appearing, toxic-appearing or diaphoretic.   HENT:      Head: Normocephalic and atraumatic.      Right Ear:  External ear normal.      Left Ear: External ear normal.      Nose: Nose normal.      Mouth/Throat:      Mouth: Mucous membranes are moist.      Pharynx: Oropharynx is clear. No oropharyngeal exudate or posterior oropharyngeal erythema.   Eyes:      General: Lids are normal. No scleral icterus.        Right eye: No discharge.         Left eye: No discharge.      Extraocular Movements: Extraocular movements intact.      Conjunctiva/sclera: Conjunctivae normal.      Pupils: Pupils are equal, round, and reactive to light.   Cardiovascular:      Rate and Rhythm: Normal rate and regular rhythm. No extrasystoles are present.     Heart sounds: S1 normal and S2 normal. Murmur heard.      Systolic murmur is present with a grade of 2/6.   Pulmonary:      Effort: Pulmonary effort is normal. No respiratory distress.      Breath sounds: Normal breath sounds. No stridor or decreased air movement. No wheezing or rhonchi.   Abdominal:      General: Bowel sounds are normal.      Palpations: Abdomen is soft.   Musculoskeletal:         General: Normal range of motion.      Cervical back: Normal range of motion and neck supple.   Skin:     General: Skin is warm and dry.   Neurological:      General: No focal deficit present.      Mental Status: She is alert and oriented to person, place, and time. Mental status is at baseline.      Cranial Nerves: No cranial nerve deficit.      Sensory: No sensory deficit.      Motor: No weakness.      Coordination: Coordination normal.      Gait: Gait normal.   Psychiatric:         Attention and Perception: Attention and perception normal.         Mood and Affect: Mood and affect normal.         Speech: Speech normal.         Behavior: Behavior is cooperative.         Cognition and Memory: Cognition normal.         Judgment: Judgment normal.

## 2025-04-08 ENCOUNTER — HOSPITAL ENCOUNTER (OUTPATIENT)
Dept: MAMMOGRAPHY | Facility: MEDICAL CENTER | Age: 70
Discharge: HOME | End: 2025-04-08
Payer: COMMERCIAL

## 2025-04-08 VITALS — HEIGHT: 62 IN | WEIGHT: 123 LBS | BODY MASS INDEX: 22.63 KG/M2

## 2025-04-08 DIAGNOSIS — Z12.31 ENCOUNTER FOR SCREENING MAMMOGRAM FOR MALIGNANT NEOPLASM OF BREAST: ICD-10-CM

## 2025-04-08 PROCEDURE — 77067 SCR MAMMO BI INCL CAD: CPT

## 2025-04-08 PROCEDURE — 77063 BREAST TOMOSYNTHESIS BI: CPT

## 2025-04-10 ENCOUNTER — RESULTS FOLLOW-UP (OUTPATIENT)
Dept: FAMILY MEDICINE CLINIC | Facility: CLINIC | Age: 70
End: 2025-04-10

## 2025-05-24 ENCOUNTER — HOSPITAL ENCOUNTER (INPATIENT)
Facility: HOSPITAL | Age: 70
LOS: 4 days | Discharge: HOME | DRG: 287 | End: 2025-05-28
Attending: EMERGENCY MEDICINE | Admitting: INTERNAL MEDICINE
Payer: COMMERCIAL

## 2025-05-24 ENCOUNTER — APPOINTMENT (EMERGENCY)
Dept: RADIOLOGY | Facility: HOSPITAL | Age: 70
DRG: 287 | End: 2025-05-24
Payer: COMMERCIAL

## 2025-05-24 DIAGNOSIS — I48.91 NEW ONSET A-FIB (HCC): ICD-10-CM

## 2025-05-24 DIAGNOSIS — R79.89 ELEVATED TROPONIN: ICD-10-CM

## 2025-05-24 DIAGNOSIS — I10 ESSENTIAL HYPERTENSION: ICD-10-CM

## 2025-05-24 DIAGNOSIS — I48.91 ATRIAL FIBRILLATION WITH RAPID VENTRICULAR RESPONSE (HCC): Primary | ICD-10-CM

## 2025-05-24 DIAGNOSIS — I24.89 DEMAND ISCHEMIA (HCC): ICD-10-CM

## 2025-05-24 LAB
2HR DELTA HS TROPONIN: 158 NG/L
4HR DELTA HS TROPONIN: 368 NG/L
ALBUMIN SERPL BCG-MCNC: 3.9 G/DL (ref 3.5–5)
ALP SERPL-CCNC: 93 U/L (ref 34–104)
ALT SERPL W P-5'-P-CCNC: 20 U/L (ref 7–52)
ANION GAP SERPL CALCULATED.3IONS-SCNC: 5 MMOL/L (ref 4–13)
APTT PPP: 29 SECONDS (ref 23–34)
APTT PPP: 80 SECONDS (ref 23–34)
AST SERPL W P-5'-P-CCNC: 24 U/L (ref 13–39)
BASOPHILS # BLD AUTO: 0.05 THOUSANDS/ÂΜL (ref 0–0.1)
BASOPHILS NFR BLD AUTO: 1 % (ref 0–1)
BILIRUB SERPL-MCNC: 0.4 MG/DL (ref 0.2–1)
BNP SERPL-MCNC: 298 PG/ML (ref 0–100)
BUN SERPL-MCNC: 22 MG/DL (ref 5–25)
CALCIUM SERPL-MCNC: 9.2 MG/DL (ref 8.4–10.2)
CARDIAC TROPONIN I PNL SERPL HS: 177 NG/L (ref ?–50)
CARDIAC TROPONIN I PNL SERPL HS: 19 NG/L (ref ?–50)
CARDIAC TROPONIN I PNL SERPL HS: 387 NG/L (ref ?–50)
CHLORIDE SERPL-SCNC: 112 MMOL/L (ref 96–108)
CO2 SERPL-SCNC: 25 MMOL/L (ref 21–32)
CREAT SERPL-MCNC: 0.82 MG/DL (ref 0.6–1.3)
EOSINOPHIL # BLD AUTO: 0.18 THOUSAND/ÂΜL (ref 0–0.61)
EOSINOPHIL NFR BLD AUTO: 3 % (ref 0–6)
ERYTHROCYTE [DISTWIDTH] IN BLOOD BY AUTOMATED COUNT: 13.1 % (ref 11.6–15.1)
GFR SERPL CREATININE-BSD FRML MDRD: 74 ML/MIN/1.73SQ M
GLUCOSE SERPL-MCNC: 121 MG/DL (ref 65–140)
HCT VFR BLD AUTO: 40.6 % (ref 34.8–46.1)
HGB BLD-MCNC: 13.4 G/DL (ref 11.5–15.4)
IMM GRANULOCYTES # BLD AUTO: 0.02 THOUSAND/UL (ref 0–0.2)
IMM GRANULOCYTES NFR BLD AUTO: 0 % (ref 0–2)
INR PPP: 1.01 (ref 0.85–1.19)
LYMPHOCYTES # BLD AUTO: 2.52 THOUSANDS/ÂΜL (ref 0.6–4.47)
LYMPHOCYTES NFR BLD AUTO: 42 % (ref 14–44)
MAGNESIUM SERPL-MCNC: 2 MG/DL (ref 1.9–2.7)
MCH RBC QN AUTO: 31.8 PG (ref 26.8–34.3)
MCHC RBC AUTO-ENTMCNC: 33 G/DL (ref 31.4–37.4)
MCV RBC AUTO: 96 FL (ref 82–98)
MONOCYTES # BLD AUTO: 0.41 THOUSAND/ÂΜL (ref 0.17–1.22)
MONOCYTES NFR BLD AUTO: 7 % (ref 4–12)
NEUTROPHILS # BLD AUTO: 2.83 THOUSANDS/ÂΜL (ref 1.85–7.62)
NEUTS SEG NFR BLD AUTO: 47 % (ref 43–75)
NRBC BLD AUTO-RTO: 0 /100 WBCS
PLATELET # BLD AUTO: 220 THOUSANDS/UL (ref 149–390)
PMV BLD AUTO: 10.3 FL (ref 8.9–12.7)
POTASSIUM SERPL-SCNC: 4.3 MMOL/L (ref 3.5–5.3)
PROT SERPL-MCNC: 6 G/DL (ref 6.4–8.4)
PROTHROMBIN TIME: 13.5 SECONDS (ref 12.3–15)
RBC # BLD AUTO: 4.21 MILLION/UL (ref 3.81–5.12)
SODIUM SERPL-SCNC: 142 MMOL/L (ref 135–147)
TSH SERPL DL<=0.05 MIU/L-ACNC: 1.6 UIU/ML (ref 0.45–4.5)
WBC # BLD AUTO: 6.01 THOUSAND/UL (ref 4.31–10.16)

## 2025-05-24 PROCEDURE — 85730 THROMBOPLASTIN TIME PARTIAL: CPT | Performed by: EMERGENCY MEDICINE

## 2025-05-24 PROCEDURE — 99291 CRITICAL CARE FIRST HOUR: CPT | Performed by: EMERGENCY MEDICINE

## 2025-05-24 PROCEDURE — 83880 ASSAY OF NATRIURETIC PEPTIDE: CPT | Performed by: EMERGENCY MEDICINE

## 2025-05-24 PROCEDURE — 85730 THROMBOPLASTIN TIME PARTIAL: CPT | Performed by: INTERNAL MEDICINE

## 2025-05-24 PROCEDURE — 96376 TX/PRO/DX INJ SAME DRUG ADON: CPT

## 2025-05-24 PROCEDURE — 85610 PROTHROMBIN TIME: CPT | Performed by: EMERGENCY MEDICINE

## 2025-05-24 PROCEDURE — 84443 ASSAY THYROID STIM HORMONE: CPT | Performed by: EMERGENCY MEDICINE

## 2025-05-24 PROCEDURE — 96368 THER/DIAG CONCURRENT INF: CPT

## 2025-05-24 PROCEDURE — 99223 1ST HOSP IP/OBS HIGH 75: CPT | Performed by: INTERNAL MEDICINE

## 2025-05-24 PROCEDURE — 84484 ASSAY OF TROPONIN QUANT: CPT | Performed by: EMERGENCY MEDICINE

## 2025-05-24 PROCEDURE — 71045 X-RAY EXAM CHEST 1 VIEW: CPT

## 2025-05-24 PROCEDURE — 99222 1ST HOSP IP/OBS MODERATE 55: CPT | Performed by: INTERNAL MEDICINE

## 2025-05-24 PROCEDURE — 93005 ELECTROCARDIOGRAM TRACING: CPT

## 2025-05-24 PROCEDURE — 99285 EMERGENCY DEPT VISIT HI MDM: CPT

## 2025-05-24 PROCEDURE — 36415 COLL VENOUS BLD VENIPUNCTURE: CPT | Performed by: EMERGENCY MEDICINE

## 2025-05-24 PROCEDURE — 96366 THER/PROPH/DIAG IV INF ADDON: CPT

## 2025-05-24 PROCEDURE — 80053 COMPREHEN METABOLIC PANEL: CPT | Performed by: EMERGENCY MEDICINE

## 2025-05-24 PROCEDURE — 96375 TX/PRO/DX INJ NEW DRUG ADDON: CPT

## 2025-05-24 PROCEDURE — 83735 ASSAY OF MAGNESIUM: CPT

## 2025-05-24 PROCEDURE — 96365 THER/PROPH/DIAG IV INF INIT: CPT

## 2025-05-24 PROCEDURE — 85025 COMPLETE CBC W/AUTO DIFF WBC: CPT | Performed by: EMERGENCY MEDICINE

## 2025-05-24 RX ORDER — MAGNESIUM HYDROXIDE/ALUMINUM HYDROXICE/SIMETHICONE 120; 1200; 1200 MG/30ML; MG/30ML; MG/30ML
30 SUSPENSION ORAL EVERY 6 HOURS PRN
Status: DISCONTINUED | OUTPATIENT
Start: 2025-05-24 | End: 2025-05-24

## 2025-05-24 RX ORDER — MAGNESIUM HYDROXIDE/ALUMINUM HYDROXICE/SIMETHICONE 120; 1200; 1200 MG/30ML; MG/30ML; MG/30ML
30 SUSPENSION ORAL EVERY 6 HOURS PRN
Status: DISCONTINUED | OUTPATIENT
Start: 2025-05-24 | End: 2025-05-28 | Stop reason: HOSPADM

## 2025-05-24 RX ORDER — PRAVASTATIN SODIUM 80 MG/1
80 TABLET ORAL
Status: DISCONTINUED | OUTPATIENT
Start: 2025-05-24 | End: 2025-05-25

## 2025-05-24 RX ORDER — NICOTINE 21 MG/24HR
1 PATCH, TRANSDERMAL 24 HOURS TRANSDERMAL DAILY
Status: DISCONTINUED | OUTPATIENT
Start: 2025-05-24 | End: 2025-05-28 | Stop reason: HOSPADM

## 2025-05-24 RX ORDER — METOPROLOL TARTRATE 1 MG/ML
5 INJECTION, SOLUTION INTRAVENOUS ONCE
Status: COMPLETED | OUTPATIENT
Start: 2025-05-24 | End: 2025-05-24

## 2025-05-24 RX ORDER — DILTIAZEM HYDROCHLORIDE 5 MG/ML
0.35 INJECTION INTRAVENOUS ONCE
Status: COMPLETED | OUTPATIENT
Start: 2025-05-24 | End: 2025-05-24

## 2025-05-24 RX ORDER — SODIUM CHLORIDE, SODIUM GLUCONATE, SODIUM ACETATE, POTASSIUM CHLORIDE, MAGNESIUM CHLORIDE, SODIUM PHOSPHATE, DIBASIC, AND POTASSIUM PHOSPHATE .53; .5; .37; .037; .03; .012; .00082 G/100ML; G/100ML; G/100ML; G/100ML; G/100ML; G/100ML; G/100ML
1000 INJECTION, SOLUTION INTRAVENOUS ONCE
Status: COMPLETED | OUTPATIENT
Start: 2025-05-24 | End: 2025-05-24

## 2025-05-24 RX ORDER — HEPARIN SODIUM 1000 [USP'U]/ML
3600 INJECTION, SOLUTION INTRAVENOUS; SUBCUTANEOUS EVERY 6 HOURS PRN
Status: DISCONTINUED | OUTPATIENT
Start: 2025-05-24 | End: 2025-05-25

## 2025-05-24 RX ORDER — POLYETHYLENE GLYCOL 3350 17 G/17G
17 POWDER, FOR SOLUTION ORAL DAILY PRN
Status: DISCONTINUED | OUTPATIENT
Start: 2025-05-24 | End: 2025-05-24

## 2025-05-24 RX ORDER — HEPARIN SODIUM 1000 [USP'U]/ML
1800 INJECTION, SOLUTION INTRAVENOUS; SUBCUTANEOUS EVERY 6 HOURS PRN
Status: DISCONTINUED | OUTPATIENT
Start: 2025-05-24 | End: 2025-05-25

## 2025-05-24 RX ORDER — HEPARIN SODIUM 10000 [USP'U]/100ML
3-20 INJECTION, SOLUTION INTRAVENOUS
Status: DISCONTINUED | OUTPATIENT
Start: 2025-05-24 | End: 2025-05-25

## 2025-05-24 RX ORDER — ONDANSETRON 2 MG/ML
4 INJECTION INTRAMUSCULAR; INTRAVENOUS EVERY 6 HOURS PRN
Status: DISCONTINUED | OUTPATIENT
Start: 2025-05-24 | End: 2025-05-24

## 2025-05-24 RX ORDER — ONDANSETRON 2 MG/ML
4 INJECTION INTRAMUSCULAR; INTRAVENOUS EVERY 6 HOURS PRN
Status: DISCONTINUED | OUTPATIENT
Start: 2025-05-24 | End: 2025-05-28 | Stop reason: HOSPADM

## 2025-05-24 RX ORDER — METOPROLOL TARTRATE 25 MG/1
25 TABLET, FILM COATED ORAL EVERY 12 HOURS SCHEDULED
Status: DISCONTINUED | OUTPATIENT
Start: 2025-05-24 | End: 2025-05-25

## 2025-05-24 RX ORDER — NICOTINE 21 MG/24HR
21 PATCH, TRANSDERMAL 24 HOURS TRANSDERMAL DAILY
Status: DISCONTINUED | OUTPATIENT
Start: 2025-05-24 | End: 2025-05-24

## 2025-05-24 RX ORDER — METOPROLOL TARTRATE 25 MG/1
25 TABLET, FILM COATED ORAL EVERY 12 HOURS SCHEDULED
Status: DISCONTINUED | OUTPATIENT
Start: 2025-05-24 | End: 2025-05-24

## 2025-05-24 RX ORDER — OMEGA-3S/DHA/EPA/FISH OIL/D3 300MG-1000
400 CAPSULE ORAL DAILY
COMMUNITY

## 2025-05-24 RX ORDER — POLYETHYLENE GLYCOL 3350 17 G/17G
17 POWDER, FOR SOLUTION ORAL DAILY PRN
Status: DISCONTINUED | OUTPATIENT
Start: 2025-05-24 | End: 2025-05-28 | Stop reason: HOSPADM

## 2025-05-24 RX ORDER — SODIUM CHLORIDE, SODIUM GLUCONATE, SODIUM ACETATE, POTASSIUM CHLORIDE, MAGNESIUM CHLORIDE, SODIUM PHOSPHATE, DIBASIC, AND POTASSIUM PHOSPHATE .53; .5; .37; .037; .03; .012; .00082 G/100ML; G/100ML; G/100ML; G/100ML; G/100ML; G/100ML; G/100ML
100 INJECTION, SOLUTION INTRAVENOUS CONTINUOUS
Status: DISCONTINUED | OUTPATIENT
Start: 2025-05-24 | End: 2025-05-25

## 2025-05-24 RX ORDER — HEPARIN SODIUM 1000 [USP'U]/ML
3600 INJECTION, SOLUTION INTRAVENOUS; SUBCUTANEOUS ONCE
Status: COMPLETED | OUTPATIENT
Start: 2025-05-24 | End: 2025-05-24

## 2025-05-24 RX ORDER — DILTIAZEM HYDROCHLORIDE 5 MG/ML
0.25 INJECTION INTRAVENOUS ONCE
Status: COMPLETED | OUTPATIENT
Start: 2025-05-24 | End: 2025-05-24

## 2025-05-24 RX ORDER — NICOTINE 21 MG/24HR
1 PATCH, TRANSDERMAL 24 HOURS TRANSDERMAL DAILY
Status: DISCONTINUED | OUTPATIENT
Start: 2025-05-24 | End: 2025-05-24

## 2025-05-24 RX ADMIN — NICOTINE 1 PATCH: 21 PATCH, EXTENDED RELEASE TRANSDERMAL at 14:54

## 2025-05-24 RX ADMIN — METOPROLOL TARTRATE 25 MG: 25 TABLET, FILM COATED ORAL at 20:28

## 2025-05-24 RX ADMIN — HEPARIN SODIUM 3600 UNITS: 1000 INJECTION, SOLUTION INTRAVENOUS; SUBCUTANEOUS at 11:13

## 2025-05-24 RX ADMIN — SODIUM CHLORIDE, SODIUM GLUCONATE, SODIUM ACETATE, POTASSIUM CHLORIDE, MAGNESIUM CHLORIDE, SODIUM PHOSPHATE, DIBASIC, AND POTASSIUM PHOSPHATE 1000 ML: .53; .5; .37; .037; .03; .012; .00082 INJECTION, SOLUTION INTRAVENOUS at 14:01

## 2025-05-24 RX ADMIN — DILTIAZEM HYDROCHLORIDE 20 MG: 5 INJECTION, SOLUTION INTRAVENOUS at 11:44

## 2025-05-24 RX ADMIN — PRAVASTATIN SODIUM 80 MG: 80 TABLET ORAL at 17:15

## 2025-05-24 RX ADMIN — DILTIAZEM HYDROCHLORIDE 5 MG/HR: 5 INJECTION, SOLUTION INTRAVENOUS at 11:00

## 2025-05-24 RX ADMIN — METOPROLOL TARTRATE 5 MG: 5 INJECTION INTRAVENOUS at 16:20

## 2025-05-24 RX ADMIN — DILTIAZEM HYDROCHLORIDE 15 MG: 5 INJECTION, SOLUTION INTRAVENOUS at 10:29

## 2025-05-24 RX ADMIN — SODIUM CHLORIDE 1000 ML: 0.9 INJECTION, SOLUTION INTRAVENOUS at 12:39

## 2025-05-24 RX ADMIN — DILTIAZEM HYDROCHLORIDE 7.5 MG/HR: 5 INJECTION, SOLUTION INTRAVENOUS at 11:30

## 2025-05-24 RX ADMIN — HEPARIN SODIUM 12 UNITS/KG/HR: 10000 INJECTION, SOLUTION INTRAVENOUS at 11:12

## 2025-05-24 RX ADMIN — SODIUM CHLORIDE, SODIUM GLUCONATE, SODIUM ACETATE, POTASSIUM CHLORIDE, MAGNESIUM CHLORIDE, SODIUM PHOSPHATE, DIBASIC, AND POTASSIUM PHOSPHATE 100 ML/HR: .53; .5; .37; .037; .03; .012; .00082 INJECTION, SOLUTION INTRAVENOUS at 15:49

## 2025-05-24 NOTE — ED PROVIDER NOTES
Time reflects when diagnosis was documented in both MDM as applicable and the Disposition within this note       Time User Action Codes Description Comment    5/24/2025 10:43 AM Kalyani Crews Add [I48.91] Atrial fibrillation with rapid ventricular response (HCC)     5/24/2025  2:57 PM BradyMarycruz malloy Add [I48.91] New onset a-fib (HCC)     5/24/2025  5:11 PM Kalyani Crews Add [I24.89] Demand ischemia (HCC)           ED Disposition       ED Disposition   Admit    Condition   Stable    Date/Time   Sat May 24, 2025 10:43 AM    Comment   Case was discussed with  and the patient's admission status was agreed to be  to the service of   .               Assessment & Plan       Medical Decision Making  Arrived w/ CP/pressure and found to be in rapid afib on bedside monitor.  Will get EKG to r/o ischemic changes.  Will get labs to r/o acute life threatening metabolic abnl, renal dysfunction, electrolyte abnl, cardiac ischemia, significant leukocytosis / anemia.  Will get CXR to r/o occult pna, pulm edema, cardiomeg, effusions.       Problems Addressed:  Atrial fibrillation with rapid ventricular response (HCC): acute illness or injury that poses a threat to life or bodily functions    Amount and/or Complexity of Data Reviewed  Labs: ordered. Decision-making details documented in ED Course.  Radiology: ordered.  ECG/medicine tests: ordered and independent interpretation performed.  Discussion of management or test interpretation with external provider(s): D/w Cardiology  D/w SLIM    Risk  Prescription drug management.  Decision regarding hospitalization.  Risk Details: Critical Care Time Statement: Upon my evaluation, this patient had a high probability of imminent or life-threatening deterioration due to cardiac arrhythmia, which required my direct attention, intervention, and personal management.  I spent a total of 35 minutes directly providing critical care services, including interpretation of complex medical  databases, evaluating for the presence of life-threatening injuries or illnesses, complex medical decision making (to support/prevent further life-threatening deterioration)., interpretation of hemodynamic data, titration of vasoactive medications, and titration of continuous IV medications (drips). This time is exclusive of procedures, teaching, treating other patients, family meetings, and any prior time recorded by providers other than myself.            ED Course as of 05/24/25 1711   Sat May 24, 2025   1041 [id number] score 3  Will order heparin   1058 hs TnI 0hr: 19  No old   1058 BNP(!): 298  Lungs clear, no LE swelling   1131 Took 1h to get the cardizem gtt from pharmacy - will order 2nd bolus at 0.35 mg/kg       Medications   diltiazem (CARDIZEM) 125 mg in sodium chloride 0.9 % 125 mL infusion (2.5 mg/hr Intravenous Rate/Dose Change 5/24/25 1708)   heparin (porcine) 25,000 units in 0.45% NaCl 250 mL infusion (premix) (12 Units/kg/hr × 60 kg (Order-Specific) Intravenous New Bag 5/24/25 1112)   heparin (porcine) injection 3,600 Units (has no administration in time range)   heparin (porcine) injection 1,800 Units (has no administration in time range)   pravastatin (PRAVACHOL) tablet 80 mg (has no administration in time range)   polyethylene glycol (MIRALAX) packet 17 g (has no administration in time range)   ondansetron (ZOFRAN) injection 4 mg (has no administration in time range)   aluminum-magnesium hydroxide-simethicone (MAALOX) oral suspension 30 mL (has no administration in time range)   nicotine (NICODERM CQ) 21 mg/24 hr TD 24 hr patch 1 patch (1 patch Transdermal Medication Applied 5/24/25 2658)   metoprolol tartrate (LOPRESSOR) tablet 25 mg (has no administration in time range)   multi-electrolyte (Plasmalyte-A/Isolyte-S PH 7.4/Normosol-R) IV solution (100 mL/hr Intravenous New Bag 5/24/25 1549)   diltiazem (CARDIZEM) injection 15 mg (15 mg Intravenous Given 5/24/25 1029)   heparin (porcine)  injection 3,600 Units (3,600 Units Intravenous Given 5/24/25 1113)   diltiazem (CARDIZEM) injection 20 mg (20 mg Intravenous Given 5/24/25 1144)   sodium chloride 0.9 % bolus 1,000 mL (0 mL Intravenous Stopped 5/24/25 1339)   metoprolol (LOPRESSOR) injection 5 mg (5 mg Intravenous Given 5/24/25 1620)   multi-electrolyte (Plasmalyte-A/Isolyte-S PH 7.4/Normosol-R) IV bolus 1,000 mL (0 mL Intravenous Stopped 5/24/25 1549)       ED Risk Strat Scores            [id number] SCORE      Flowsheet Row Most Recent Value   [id number]    Age 1 Filed at: 05/24/2025 1325   Sex 1 Filed at: 05/24/2025 1325   CHF History 0 Filed at: 05/24/2025 1325   HTN History 1 Filed at: 05/24/2025 1325   Stroke or TIA Symptoms Previously 0 Filed at: 05/24/2025 1325   Vascular Disease History 0 Filed at: 05/24/2025 1325   Diabetes History 0 Filed at: 05/24/2025 1325   [id number] Score 3 Filed at: 05/24/2025 1325                No data recorded        SBIRT 22yo+      Flowsheet Row Most Recent Value   Initial Alcohol Screen: US AUDIT-C     1. How often do you have a drink containing alcohol? 0 Filed at: 05/24/2025 1022   2. How many drinks containing alcohol do you have on a typical day you are drinking?  0 Filed at: 05/24/2025 1022   3a. Male UNDER 65: How often do you have five or more drinks on one occasion? 0 Filed at: 05/24/2025 1022   3b. FEMALE Any Age, or MALE 65+: How often do you have 4 or more drinks on one occassion? 0 Filed at: 05/24/2025 1022   Audit-C Score 0 Filed at: 05/24/2025 1022   JESSICA: How many times in the past year have you...    Used an illegal drug or used a prescription medication for non-medical reasons? Never Filed at: 05/24/2025 1022                            History of Present Illness       Chief Complaint   Patient presents with    Atrial Fibrillation     Pt woke up approx 0800 with chest pressure. Pt states it feels like something is sitting on her chest. Per EMS Pt HR ranging between 130-170 pre hospital        Past Medical History[1]   Past Surgical History[2]   Family History[3]   Social History[4]   E-Cigarette/Vaping    E-Cigarette Use Never User       E-Cigarette/Vaping Substances    Nicotine No     THC No     CBD No     Flavoring No       I have reviewed and agree with the history as documented.     Patient presents with:  Atrial Fibrillation: Pt woke up approx 0800 with chest pressure. Pt states it feels like something is sitting on her chest. Per EMS Pt HR ranging between 130-170 pre hospital    69y F here for evaluation of chest pain/pressure. Woke around 0830 and noted some chest pressure.  Pressure was across the chest w/ some tingling in the hands.  +diaphoresis, nausea, +palpitations, +LH.  Went down stairs, took some gas-x b/c she felt like she had some indigestion. When it didn't improve, called 911.    Hx of HTN, HLP, +smoker.  No known CAD or irreg HR.  No thyroid dysfunction.  Remote hx of stress test that was negative.      History provided by:  Patient   used: No    Atrial Fibrillation      Review of Systems   All other systems reviewed and are negative.          Objective       ED Triage Vitals   Temperature Pulse Blood Pressure Respirations SpO2 Patient Position - Orthostatic VS   05/24/25 1022 05/24/25 1022 05/24/25 1022 05/24/25 1022 05/24/25 1022 05/24/25 1022   98.4 °F (36.9 °C) (!) 156 117/56 (!) 24 99 % Sitting      Temp src Heart Rate Source BP Location FiO2 (%) Pain Score    -- 05/24/25 1022 05/24/25 1022 -- 05/24/25 1617     Monitor Right arm  No Pain      Vitals      Date and Time Temp Pulse SpO2 Resp BP Pain Score FACES Pain Rating User   05/24/25 1700 -- 69 97 % 20 114/65 -- -- AB   05/24/25 1630 -- 81 94 % 20 92/61 -- -- AB   05/24/25 1617 -- 144 95 % 20 94/53 No Pain -- AB   05/24/25 1600 -- 144 94 % 20 104/64 -- -- AB   05/24/25 1545 -- 138 94 % 18 108/70 -- -- DW   05/24/25 1530 -- 144 95 % 20 85/65 -- -- LAP   05/24/25 1526 -- -- -- -- 86/63 -- -- AB    05/24/25 1515 -- 140 95 % 20 91/59 -- -- AB   05/24/25 1500 -- 124 96 % 20 124/71 -- -- AB   05/24/25 1400 -- 143 95 % 18 95/69 -- -- DW   05/24/25 1316 -- 101 94 % 18 120/69 -- -- DW   05/24/25 1301 -- 141 95 % 18 123/87 -- --    05/24/25 1246 -- 141 95 % -- 112/74 -- --    05/24/25 1230 -- 143 94 % 18 94/61 -- --    05/24/25 1215 -- 142 94 % 18 99/66 -- --    05/24/25 1200 -- 141 94 % 18 100/67 -- --    05/24/25 1145 -- 114 98 % 18 126/68 -- --    05/24/25 1130 -- 141 96 % 18 120/80 -- --    05/24/25 1115 -- 141 95 % 18 119/85 -- --    05/24/25 1100 -- 141 -- -- 116/81 -- --    05/24/25 1045 -- 141 98 % 23 128/85 -- -- ML   05/24/25 1022 98.4 °F (36.9 °C) 156 99 % 24 117/56 -- -- ML            Physical Exam  Vitals and nursing note reviewed.   Constitutional:       General: She is not in acute distress.     Appearance: Normal appearance. She is not ill-appearing, toxic-appearing or diaphoretic.     Eyes:      Conjunctiva/sclera: Conjunctivae normal.       Cardiovascular:      Rate and Rhythm: Tachycardia present. Rhythm irregular.   Pulmonary:      Effort: Pulmonary effort is normal. No respiratory distress.      Breath sounds: Normal breath sounds. No stridor. No wheezing, rhonchi or rales.   Chest:      Chest wall: No tenderness.   Abdominal:      Palpations: Abdomen is soft.     Musculoskeletal:         General: No tenderness.      Cervical back: Normal range of motion.      Right lower leg: No edema.      Left lower leg: No edema.     Skin:     General: Skin is warm.      Capillary Refill: Capillary refill takes less than 2 seconds.     Neurological:      General: No focal deficit present.      Mental Status: She is alert and oriented to person, place, and time.     Psychiatric:         Mood and Affect: Mood normal.         Results Reviewed       Procedure Component Value Units Date/Time    APTT six (6) hours after Heparin bolus/drip initiation or dosing change [[id number]]     Lab Status:  No result Specimen: Blood     HS Troponin I 4hr [[id number]]  (Abnormal) Collected: 05/24/25 1458    Lab Status: Final result Specimen: Blood from Line, Venous Updated: 05/24/25 1527     hs TnI 4hr 387 ng/L      Delta 4hr hsTnI 368 ng/L     Magnesium [[id number]]  (Normal) Collected: 05/24/25 1239    Lab Status: Final result Specimen: Blood from Line, Venous Updated: 05/24/25 1511     Magnesium 2.0 mg/dL     HS Troponin I 2hr [[id number]]  (Abnormal) Collected: 05/24/25 1239    Lab Status: Final result Specimen: Blood from Line, Venous Updated: 05/24/25 1344     hs TnI 2hr 177 ng/L      Delta 2hr hsTnI 158 ng/L     TSH, 3rd generation with Free T4 reflex [[id number]]  (Normal) Collected: 05/24/25 1026    Lab Status: Final result Specimen: Blood from Arm, Right Updated: 05/24/25 1107     TSH 3RD GENERATON 1.598 uIU/mL     HS Troponin 0hr (reflex protocol) [[id number]]  (Normal) Collected: 05/24/25 1026    Lab Status: Final result Specimen: Blood from Arm, Right Updated: 05/24/25 1057     hs TnI 0hr 19 ng/L     B-Type Natriuretic Peptide(BNP) [[id number]]  (Abnormal) Collected: 05/24/25 1026    Lab Status: Final result Specimen: Blood from Arm, Right Updated: 05/24/25 1057      pg/mL     Comprehensive metabolic panel [[id number]]  (Abnormal) Collected: 05/24/25 1026    Lab Status: Final result Specimen: Blood from Arm, Right Updated: 05/24/25 1052     Sodium 142 mmol/L      Potassium 4.3 mmol/L      Chloride 112 mmol/L      CO2 25 mmol/L      ANION GAP 5 mmol/L      BUN 22 mg/dL      Creatinine 0.82 mg/dL      Glucose 121 mg/dL      Calcium 9.2 mg/dL      AST 24 U/L      ALT 20 U/L      Alkaline Phosphatase 93 U/L      Total Protein 6.0 g/dL      Albumin 3.9 g/dL      Total Bilirubin 0.40 mg/dL      eGFR 74 ml/min/1.73sq m     Narrative:      [employer] guidelines for Chronic Kidney Disease (CKD):     Stage 1 with normal or high GFR (GFR > 90 mL/min/1.73 square meters)    Stage 2 Mild CKD (GFR  = 60-89 mL/min/1.73 square meters)    Stage 3A Moderate CKD (GFR = 45-59 mL/min/1.73 square meters)    Stage 3B Moderate CKD (GFR = 30-44 mL/min/1.73 square meters)    Stage 4 Severe CKD (GFR = 15-29 mL/min/1.73 square meters)    Stage 5 End Stage CKD (GFR <15 mL/min/1.73 square meters)  Note: GFR calculation is accurate only with a steady state creatinine    Protime-INR [[id number]]  (Normal) Collected: 05/24/25 1026    Lab Status: Final result Specimen: Blood from Arm, Right Updated: 05/24/25 1047     Protime 13.5 seconds      INR 1.01    Narrative:      INR Therapeutic Range    Indication                                             INR Range      Atrial Fibrillation                                               2.0-3.0  Hypercoagulable State                                    2.0.2.3  Left Ventricular Asist Device                            2.0-3.0  Mechanical Heart Valve                                  -    Aortic(with afib, MI, embolism, HF, LA enlargement,    and/or coagulopathy)                                     2.0-3.0 (2.5-3.5)     Mitral                                                             2.5-3.5  Prosthetic/Bioprosthetic Heart Valve               2.0-3.0  Venous thromboembolism (VTE: VT, PE        2.0-3.0    APTT [[id number]]  (Normal) Collected: 05/24/25 1026    Lab Status: Final result Specimen: Blood from Arm, Right Updated: 05/24/25 1047     PTT 29 seconds     CBC and differential [[id number]] Collected: 05/24/25 1026    Lab Status: Final result Specimen: Blood from Arm, Right Updated: 05/24/25 1037     WBC 6.01 Thousand/uL      RBC 4.21 Million/uL      Hemoglobin 13.4 g/dL      Hematocrit 40.6 %      MCV 96 fL      MCH 31.8 pg      MCHC 33.0 g/dL      RDW 13.1 %      MPV 10.3 fL      Platelets 220 Thousands/uL      nRBC 0 /100 WBCs      Segmented % 47 %      Immature Grans % 0 %      Lymphocytes % 42 %      Monocytes % 7 %      Eosinophils Relative 3 %      Basophils Relative 1 %       Absolute Neutrophils 2.83 Thousands/µL      Absolute Immature Grans 0.02 Thousand/uL      Absolute Lymphocytes 2.52 Thousands/µL      Absolute Monocytes 0.41 Thousand/µL      Eosinophils Absolute 0.18 Thousand/µL      Basophils Absolute 0.05 Thousands/µL             XR chest 1 view portable   Final Interpretation by Shawna Latif MD (05/24 1405)      Mild pulmonary venous congestion with trace pleural effusions.            Workstation performed: [id number]             ECG 12 Lead Documentation Only    Date/Time: 5/24/2025 10:23 AM    Performed by: Kalyani Crews DO  Authorized by: Kalyani Crews DO    Indications / Diagnosis:  Palpitations  ECG reviewed by me, the ED Provider: yes    Patient location:  ED  Previous ECG:     Previous ECG:  Unavailable  Interpretation:     Interpretation: abnormal    Rate:     ECG rate:  159    ECG rate assessment: tachycardic    Rhythm:     Rhythm: atrial fibrillation    QRS:     QRS axis:  Normal  ST segments:     ST segments:  Non-specific (diffuse mild depressions)  T waves:     T waves: non-specific        ED Medication and Procedure Management   Prior to Admission Medications   Prescriptions Last Dose Informant Patient Reported? Taking?   Calcium Carb-Cholecalciferol 600-20 MG-MCG TABS 5/23/2025 Evening  Yes Yes   EPINEPHrine (EPIPEN) 0.3 mg/0.3 mL SOAJ   No No   Sig: Inject 0.3 mL (0.3 mg total) into a muscle once for 1 dose   cholecalciferol (VITAMIN D3) 400 units tablet 5/23/2025 Evening  Yes Yes   Sig: Take 400 Units by mouth daily   hydroCHLOROthiazide 12.5 mg tablet 5/23/2025 Morning  No Yes   Sig: Take 1 tablet (12.5 mg total) by mouth daily   lisinopril (ZESTRIL) 20 mg tablet 5/23/2025 Evening  No Yes   Sig: Take 1 tablet (20 mg total) by mouth 2 (two) times a day   rosuvastatin (CRESTOR) 10 MG tablet 5/23/2025 Evening  No Yes   Sig: Take 1 tablet (10 mg total) by mouth daily      Facility-Administered Medications: None     Patient's Medications   Discharge  Prescriptions    APIXABAN (ELIQUIS) 5 MG    Take 1 tablet (5 mg total) by mouth 2 (two) times a day       Start Date: 5/24/2025 End Date: --       Order Dose: 5 mg       Quantity: 60 tablet    Refills: 1     No discharge procedures on file.  ED SEPSIS DOCUMENTATION   Time reflects when diagnosis was documented in both MDM as applicable and the Disposition within this note       Time User Action Codes Description Comment    5/24/2025 10:43 AM Kalyani Crews Add [I48.91] Atrial fibrillation with rapid ventricular response (HCC)     5/24/2025  2:57 PM Marycruz Abreu Add [I48.91] New onset a-fib (HCC)     5/24/2025  5:11 PM Kalyani Crews Add [I24.89] Demand ischemia (HCC)                      [1]   Past Medical History:  Diagnosis Date    Carotid artery stenosis     Cyst of breast     Dyspareunia in female     Hyperlipidemia     Hypertension     Personal history of endometriosis     RESOLVED: 12/14/16    Pre-diabetes     LAST ASSESSED: 12/14/16   [2]   Past Surgical History:  Procedure Laterality Date    COLONOSCOPY      COMPLETE; [id number] [place]    COLPOSCOPY W/ BIOPSY / CURETTAGE      COLPOSCOPY CERVIX WITH BIOPSY(S) WITH ENDOCERVICAL CURETTAGE; JANUARY 1981, JANUARY 02    ENDOMETRIAL BIOPSY      BY SUCTION; A/24/99 WITH FOCAL CROWDING POSSIBLE SIMPLE HYPERPLASIA     LAPAROSCOPIC TUBAL LIGATION Bilateral 11/1990 NOVEMBER 1990 WITH BILATERAL TUBAL ALONG WITH ELECTROCOAGULATION OF ENDOMETRIAL IMPLANT IN THE RIGHT UTEROSACRAL LIGAMENT AND RESECTION OF ANTERIOR ABDOMINAL WALL NEVUS     MOUTH SURGERY      THYROGLOSSAL DUCT EXCISION      CYST    TONSILLECTOMY AND ADENOIDECTOMY      TUBAL LIGATION     [3]   Family History  Problem Relation Name Age of Onset    Hypertension Mother          BENIGN ESSENTIAL    Stroke Mother          March 26, 2018    Hypertension Father          BENIGN ESSENTIAL    Skin cancer Father      Diverticulosis Father      Diverticulitis Father      Lung cancer Father      Spina  bifida Sister      Colon cancer Maternal Grandfather      Hypertension Brother          BENIGN ESSENTIAL    No Known Problems Paternal Aunt      No Known Problems Paternal Aunt      Hyperlipidemia Family      Hypertension Family      Heart attack Family      Breast cancer Neg Hx     [4]   Social History  Tobacco Use    Smoking status: Every Day     Current packs/day: 1.00     Types: Cigarettes    Smokeless tobacco: Never    Tobacco comments:     TOBACCO USE   Vaping Use    Vaping status: Never Used   Substance Use Topics    Alcohol use: Not Currently     Comment: BEING A SOCIAL DRINKER    Drug use: No        Kalyani Crews DO  05/24/25 1556

## 2025-05-24 NOTE — CONSULTS
"Consultation - Cardiology   Name: Hyun Anthony 69 y.o. female I MRN: [id number]  Unit/Bed#: ED-10 I Date of Admission: 5/24/2025   Date of Service: 5/24/2025 I Hospital Day: 0   Consult to cardiology  Consult performed by: REDD Balderas  Consult ordered by: Kalyani Crews DO      Physician Requesting Evaluation: Kalyani Crews DO   Reason for Evaluation / Principal Problem: Afib with RVR     Assessment & Plan  New onset a-fib (HCC)  Patient came to [facility] emergency department with complaints of chest pressure upon waking this morning along with \" not feeling right\".  Patient reports chest pressure progressively got worse and was associated with bilateral arm numbness, lower palate discomfort and diaphoresis.  She denies any chest pain or pressure at this time or prior to this morning.    Chest x-ray: Mild pulmonary edema, evidence of cardiomegaly  EKG revealed atrial fibrillation with heart rates in the 150s  BMP: 298 -examines euvolemic, will continue to monitor  TSH 1.5  Troponin: 19<177  -suspect non-MI related troponin elevation in the setting of tachycardia    [id number] score of 3   Anticoagulated with heparin in the emergency department, would transition to DOAC when appropriate.  Diltiazem bolus and drip initiated in the emergency department for rate control  Will add metoprolol p.o. with IV bolus  Essential hypertension  Treated outpatient with lisinopril and hydrochlorothiazide -currently on hold with initiation of metoprolol and diltiazem drip  Hyperlipidemia  Takes rosuvastatin 10 mg daily -last lipid profile September 2024  Tobacco use  1 pack/day 20+ year smoker -encouraged cessation    Plan  Continue telemetry  Continue heparin drip -will price check Eliquis  Echocardiogram  Continue diltiazem drip goal heart rate less than 100  We will start metoprolol 25 mg twice daily and monitor for effectiveness -plan to titrate for goal heart rate less than 100   Daily BMP " "-repeat electrolytes as necessary  Check magnesium -replete for goal magnesium greater than 2    History of Present Illness   Hyun Anthony is a 69 y.o. female with a past medical history of hypercholesteremia, hypertension, carotid artery stenosis, EtOH abuse (quit 2 years ago ) smoking who presents to [facility] with complaints of \"not feeling right\" since waking up this morning.  Patient reports that she did not feel good when she woke up, with some midsternal chest discomfort.  She frequently has GERD symptoms and attributed it initially to GERD however chest discomfort got worse with noted diaphoresis, bilateral arm numbness and lower palate discomfort prompting her to seek evaluation in the emergency department.    Upon arrival patient noted to be in A-fib with rapid ventricular response.  Bolus of Cardizem x 2 given with drip initiated.  Patient then became hypotensive given normal saline bolus x 1 L.    Prior to this morning Hyun denies any chest pain or chest pressure, increased fatigue, dyspnea, PND orthopnea.  She denies any lightheadedness or dizziness, syncopal or presyncopal episodes.  She does report some left groin discomfort which she attributes to a pulled muscle, this somewhat impacts her ability to exercise but she is quite active caring for her grandchildren and walking her dogs.  She reports no change in her ability to do any of these activities.    Review of Systems   Constitutional:  Positive for diaphoresis and fatigue. Negative for fever.   Respiratory:  Negative for cough and shortness of breath.    Cardiovascular:  Positive for chest pain.   Gastrointestinal:  Negative for anal bleeding, blood in stool, diarrhea, nausea and vomiting.   Genitourinary:  Negative for hematuria.   Musculoskeletal:  Positive for myalgias. Negative for arthralgias, back pain and neck pain.   Neurological:  Positive for numbness. Negative for dizziness, tremors, syncope, weakness, " light-headedness and headaches.     Medical History Review: I have reviewed the patient's PMH, PSH, Social History, Family History, Meds, and Allergies     Objective :  Temp:  [98.4 °F (36.9 °C)] 98.4 °F (36.9 °C)  HR:  [114-156] 143  BP: ()/(56-85) 94/61  Resp:  [18-24] 18  SpO2:  [94 %-99 %] 94 %  O2 Device: None (Room air)  Orthostatic Blood Pressures      Flowsheet Row Most Recent Value   Blood Pressure 94/61 filed at 05/24/2025 1230   Patient Position - Orthostatic VS Sitting filed at 05/24/2025 1230          First Weight: Weight - Scale: 60.6 kg (133 lb 9.6 oz) (05/24/25 1022)  Vitals:    05/24/25 1022   Weight: 60.6 kg (133 lb 9.6 oz)       Physical Exam  Constitutional:       Appearance: Normal appearance.   HENT:      Head: Normocephalic and atraumatic.      Nose: Nose normal.      Mouth/Throat:      Mouth: Mucous membranes are moist.     Eyes:      Pupils: Pupils are equal, round, and reactive to light.       Cardiovascular:      Rate and Rhythm: Tachycardia present. Rhythm irregular.      Pulses: Normal pulses.      Heart sounds: Murmur heard.   Pulmonary:      Effort: Pulmonary effort is normal.      Breath sounds: Normal breath sounds.   Abdominal:      General: Abdomen is flat.      Palpations: Abdomen is soft.     Musculoskeletal:      Cervical back: Normal range of motion.      Right lower leg: No edema.      Left lower leg: No edema.     Skin:     General: Skin is warm.      Capillary Refill: Capillary refill takes less than 2 seconds.     Neurological:      General: No focal deficit present.      Mental Status: She is alert and oriented to person, place, and time. Mental status is at baseline.     Psychiatric:         Mood and Affect: Mood normal.         Behavior: Behavior normal.         Thought Content: Thought content normal.         Lab Results: I have reviewed the following results:Troponin,BNP:  .     05/24/25  1026   HSTNI0 19   *      Results from last 7 days   Lab Units  05/24/25  1026   WBC Thousand/uL 6.01   HEMOGLOBIN g/dL 13.4   HEMATOCRIT % 40.6   PLATELETS Thousands/uL 220     Results from last 7 days   Lab Units 05/24/25  1026   POTASSIUM mmol/L 4.3   CHLORIDE mmol/L 112*   CO2 mmol/L 25   BUN mg/dL 22   CREATININE mg/dL 0.82   CALCIUM mg/dL 9.2     Results from last 7 days   Lab Units 05/24/25  1026   INR  1.01   PTT seconds 29     Lab Results   Component Value Date    HGBA1C 5.2 06/15/2017     Lab Results   Component Value Date    CKTOTAL 94 09/01/2022       Imaging Results Review: I reviewed radiology reports from this admission including: chest xray.  Other Study Results Review: EKG was reviewed.     VTE Prophylaxis: VTE covered by:  heparin (porcine), Intravenous, 12 Units/kg/hr at 05/24/25 1112  heparin (porcine), Intravenous  heparin (porcine), Intravenous

## 2025-05-24 NOTE — H&P
H&P - Hospitalist   Name: Hyun Anthony 69 y.o. female I MRN: [id number]  Unit/Bed#: ED-10 I Date of Admission: 5/24/2025   Date of Service: 5/24/2025 I Hospital Day: 0     Assessment & Plan  New onset a-fib (HCC)  Presented to ED with tiredness and found to be in A-fib  Rate on arrival 150-170s, given bolus 0.25/KG and second bolus 0.35 mg/KG in ED  First EKG showed A-fib with heart rate 159, second EKG showed sinus tach with heart rate 144 (sinus rhythm replaced A-fib)  Currently on Cardizem drip at 7.5 mg/hour  Patient with no known history of atrial fibrillation  Current home regimen: HCTZ and lisinopril for BP, not on beta-blocker or CCB.  No recent echocardiogram  TSH WNL, Trop 0hr 19, trend  BNP 200s  Telemetry showed atrial fibrillation  [id number]= 3  Anticoagulation with heparin drip in ED, will eventually transition to DOAC  Rate control with: Cardizem drip  Cardiology consult recommendations appreciated.  Recommended Lopressor 25 twice daily with holding parameters, IV Lopressor 5 mg as needed  Patient received 1 L IV fluid bolus in ED for soft BP  Will give another 1 L IV fluid bolus due to BP 90s/60s  Continue telemetry monitoring  Echocardiogram ordered    Essential hypertension  BP soft  Patient is currently on Cardizem drip for A-fib  Hold PTA HCTZ and lisinopril for now  Hyperlipidemia  Patient was on rosuvastatin 10 mg at home, which was substituted with pravastatin 80 mg daily  Tobacco use  NRT, smoking cessation education      VTE Prophylaxis: Heparin Drip  / sequential compression device   Code Status: Full code  Discussion with family: Discussed with daughter at bedside    Anticipated Length of Stay:  Patient will be admitted on an Inpatient basis with an anticipated length of stay of more than 2 midnights.   Justification for Hospital Stay: A-fib with RVR    Total Time for Visit, including Counseling / Coordination of Care: 45 minutes.  Greater than 50% of this total time spent on direct  "patient counseling and coordination of care.    Chief Complaint:   Feeling tired    History of Present Illness:    Hyun Anthony is a 69 y.o. female with PMH of HTN, HLD who presents with feeling tired since this morning.  Patient was in usual state of health until this morning.  Patient reported she might have a touch of it last night however she was able to go to bed.  When she woke up this morning, she felt really tired and \" something is not right.\"  Denies any history of A-fib, CHF or any cardiac condition.  She denies any shortness of breath, orthopnea or PND.  No lightheaded, dizziness.  Patient is a smoker up to 1 PPD a day.  Has not been drinking alcohol over the last 2 years.  In ED, patient is found to be in A-fib with heart rate up to 150-170s.  Patient received Cardizem bolus 2 times followed by Cardizem drip.    Review of Systems:    Review of Systems Patient was seen and examined at bedside. The patient reported feeling tiredness at home.  She denies any chest pain, shortness of breath during my encounter.    Past Medical and Surgical History:     Past Medical History[1]    Past Surgical History[2]    Meds/Allergies:    Prior to Admission medications    Medication Sig Start Date End Date Taking? Authorizing Provider   Calcium Carb-Cholecalciferol 600-20 MG-MCG TABS  9/15/23   Historical Provider, MD   EPINEPHrine (EPIPEN) 0.3 mg/0.3 mL SOAJ Inject 0.3 mL (0.3 mg total) into a muscle once for 1 dose 9/13/23 9/13/23  Dannie Harris,    hydroCHLOROthiazide 12.5 mg tablet Take 1 tablet (12.5 mg total) by mouth daily 4/1/25   REDD Wu   lisinopril (ZESTRIL) 20 mg tablet Take 1 tablet (20 mg total) by mouth 2 (two) times a day 4/1/25   REDD Wu   rosuvastatin (CRESTOR) 10 MG tablet Take 1 tablet (10 mg total) by mouth daily 4/1/25   REDD Wu     I have reviewed home medications with patient personally.    Allergies: Allergies[3]    Social History:   "   Marital Status: /Civil Union   Patient Pre-hospital Living Situation: Lives at home  Patient Pre-hospital Level of Mobility: Independent  Patient Pre-hospital Diet Restrictions: No restrictions  Substance Use History:   Social History     Substance and Sexual Activity   Alcohol Use Not Currently    Comment: BEING A SOCIAL DRINKER     Tobacco Use History[4]  Social History     Substance and Sexual Activity   Drug Use No       Family History:    Family History[5]    Physical Exam:     Vitals:   Blood Pressure: 120/69 (05/24/25 1316)  Pulse: 101 (05/24/25 1316)  Temperature: 98.4 °F (36.9 °C) (05/24/25 1022)  Respirations: 18 (05/24/25 1230)  Weight - Scale: 60.6 kg (133 lb 9.6 oz) (05/24/25 1022)  SpO2: 94 % (05/24/25 1316)    Physical Exam    General: no acute distress  HEENT: NC/AT, PERRL, EOM - normal  Neck: Supple  Pulm/Chest: Normal chest wall expansion, clear breath sounds on both side  CVS: Irregularly irregular, tachycardia, normal S1&S2, capillary refill <2s  Abd: soft, non tender, non distended, bowel sounds +  MSK: move all 4 extremities spontaneously  Skin: warm  CNS: no acute focal neuro deficit      Additional Data:     Lab Results: Results Review Statement: I reviewed radiology reports from this admission including: chest xray.    Results from last 7 days   Lab Units 05/24/25  1026   WBC Thousand/uL 6.01   HEMOGLOBIN g/dL 13.4   HEMATOCRIT % 40.6   PLATELETS Thousands/uL 220   SEGS PCT % 47   LYMPHO PCT % 42   MONO PCT % 7   EOS PCT % 3     Results from last 7 days   Lab Units 05/24/25  1026   SODIUM mmol/L 142   POTASSIUM mmol/L 4.3   CHLORIDE mmol/L 112*   CO2 mmol/L 25   BUN mg/dL 22   CREATININE mg/dL 0.82   ANION GAP mmol/L 5   CALCIUM mg/dL 9.2   ALBUMIN g/dL 3.9   TOTAL BILIRUBIN mg/dL 0.40   ALK PHOS U/L 93   ALT U/L 20   AST U/L 24   GLUCOSE RANDOM mg/dL 121     Results from last 7 days   Lab Units 05/24/25  1026   INR  1.01                   Imaging: Results Review Statement: I  reviewed radiology reports from this admission including: chest xray.    XR chest 1 view portable    (Results Pending)       EKG, Pathology, and Other Studies Reviewed on Admission:   First EKG: A-fib with heart rate 159, second EKG showed sinus tach with heart rate 144    Allscripts / Epic Records Reviewed: Yes     ** Please Note: This note has been constructed using a voice recognition system. **           [1]   Past Medical History:  Diagnosis Date    Carotid artery stenosis     Cyst of breast     Dyspareunia in female     Hyperlipidemia     Hypertension     Personal history of endometriosis     RESOLVED: 12/14/16    Pre-diabetes     LAST ASSESSED: 12/14/16   [2]   Past Surgical History:  Procedure Laterality Date    COLONOSCOPY      COMPLETE; [id number] [place]    COLPOSCOPY W/ BIOPSY / CURETTAGE      COLPOSCOPY CERVIX WITH BIOPSY(S) WITH ENDOCERVICAL CURETTAGE; JANUARY 1981, JANUARY 02    ENDOMETRIAL BIOPSY      BY SUCTION; A/24/99 WITH FOCAL CROWDING POSSIBLE SIMPLE HYPERPLASIA     LAPAROSCOPIC TUBAL LIGATION Bilateral 11/1990 NOVEMBER 1990 WITH BILATERAL TUBAL ALONG WITH ELECTROCOAGULATION OF ENDOMETRIAL IMPLANT IN THE RIGHT UTEROSACRAL LIGAMENT AND RESECTION OF ANTERIOR ABDOMINAL WALL NEVUS     MOUTH SURGERY      THYROGLOSSAL DUCT EXCISION      CYST    TONSILLECTOMY AND ADENOIDECTOMY      TUBAL LIGATION     [3]   Allergies  Allergen Reactions    Bee Venom Anaphylaxis, Anxiety, Hives, Itching, Palpitations, Shortness Of Breath, Swelling and Throat Swelling    Amlodipine Myalgia    Percocet  [Oxycodone-Acetaminophen]      Other reaction(s): GI Upset   [4]   Social History  Tobacco Use   Smoking Status Every Day    Current packs/day: 1.00    Types: Cigarettes   Smokeless Tobacco Never   Tobacco Comments    TOBACCO USE   [5]   Family History  Problem Relation Name Age of Onset    Hypertension Mother          BENIGN ESSENTIAL    Stroke Mother          March 26, 2018    Hypertension Father           BENIGN ESSENTIAL    Skin cancer Father      Diverticulosis Father      Diverticulitis Father      Lung cancer Father      Spina bifida Sister      Colon cancer Maternal Grandfather      Hypertension Brother          BENIGN ESSENTIAL    No Known Problems Paternal Aunt      No Known Problems Paternal Aunt      Hyperlipidemia Family      Hypertension Family      Heart attack Family      Breast cancer Neg Hx

## 2025-05-24 NOTE — ASSESSMENT & PLAN NOTE
Treated outpatient with lisinopril and hydrochlorothiazide -currently on hold with initiation of metoprolol and diltiazem drip

## 2025-05-24 NOTE — Clinical Note
Dear Ms. Cooper,    I wanted to follow up on your recent test results:    Good news, your cholesterol levels are normal and your average sugar levels are improving (still in the pre-diabetic range but much better than before). Keep taking the same medications and keep up the great work on the weight loss! Exchanged over wire

## 2025-05-24 NOTE — PLAN OF CARE
Problem: PAIN - ADULT  Goal: Verbalizes/displays adequate comfort level or baseline comfort level  Description: Interventions:  - Encourage patient to monitor pain and request assistance  - Assess pain using appropriate pain scale  - Administer analgesics as ordered based on type and severity of pain and evaluate response  - Implement non-pharmacological measures as appropriate and evaluate response  - Consider cultural and social influences on pain and pain management  - Notify physician/advanced practitioner if interventions unsuccessful or patient reports new pain  - Educate patient/family on pain management process including their role and importance of  reporting pain   - Provide non-pharmacologic/complimentary pain relief interventions  Outcome: Progressing     Problem: INFECTION - ADULT  Goal: Absence or prevention of progression during hospitalization  Description: INTERVENTIONS:  - Assess and monitor for signs and symptoms of infection  - Monitor lab/diagnostic results  - Monitor all insertion sites, i.e. indwelling lines, tubes, and drains  - Monitor endotracheal if appropriate and nasal secretions for changes in amount and color  - [place] appropriate cooling/warming therapies per order  - Administer medications as ordered  - Instruct and encourage patient and family to use good hand hygiene technique  - Identify and instruct in appropriate isolation precautions for identified infection/condition  Outcome: Progressing  Goal: Absence of fever/infection during neutropenic period  Description: INTERVENTIONS:  - Monitor WBC  - Perform strict hand hygiene  - Limit to healthy visitors only  - No plants, dried, fresh or silk flowers with tavarez in patient room  Outcome: Progressing     Problem: SAFETY ADULT  Goal: Patient will remain free of falls  Description: INTERVENTIONS:  - Educate patient/family on patient safety including physical limitations  - Instruct patient to call for assistance with activity   -  Consider consulting OT/PT to assist with strengthening/mobility based on AM PAC & JH-HLM score  - Consult OT/PT to assist with strengthening/mobility   - Keep Call bell within reach  - Keep bed low and locked with side rails adjusted as appropriate  - Keep care items and personal belongings within reach  - Initiate and maintain comfort rounds    - Consider moving patient to room near nurses station  Outcome: Progressing  Goal: Maintain or return to baseline ADL function  Description: INTERVENTIONS:  -  Assess patient's ability to carry out ADLs; assess patient's baseline for ADL function and identify physical deficits which impact ability to perform ADLs (bathing, care of mouth/teeth, toileting, grooming, dressing, etc.)  - Assess/evaluate cause of self-care deficits   - Assess range of motion  - Assess patient's mobility; develop plan if impaired  - Assess patient's need for assistive devices and provide as appropriate  - Encourage maximum independence but intervene and supervise when necessary  - Involve family in performance of ADLs  - Assess for home care needs following discharge   - Consider OT consult to assist with ADL evaluation and planning for discharge  - Provide patient education as appropriate  - Monitor functional capacity and physical performance, use of AM PAC & JH-HLM   - Monitor gait, balance and fatigue with ambulation    Outcome: Progressing  Goal: Maintains/Returns to pre admission functional level  Description: INTERVENTIONS:  - Perform AM-PAC 6 Click Basic Mobility/ Daily Activity assessment daily.  - Set and communicate daily mobility goal to care team and patient/family/caregiver.   - Collaborate with rehabilitation services on mobility goals if consulted  - Ambulate patient 3 times a day  - Out of bed to chair 3 times a day   - Out of bed for meals 3 times a day  - Out of bed for toileting  - Record patient progress and toleration of activity level   Outcome: Progressing     Problem:  DISCHARGE PLANNING  Goal: Discharge to home or other facility with appropriate resources  Description: INTERVENTIONS:  - Identify barriers to discharge w/patient and caregiver  - Arrange for needed discharge resources and transportation as appropriate  - Identify discharge learning needs (meds, wound care, etc.)  - Arrange for interpretive services to assist at discharge as needed  - Refer to Case Management Department for coordinating discharge planning if the patient needs post-hospital services based on physician/advanced practitioner order or complex needs related to functional status, cognitive ability, or social support system  Outcome: Progressing     Problem: Knowledge Deficit  Goal: Patient/family/caregiver demonstrates understanding of disease process, treatment plan, medications, and discharge instructions  Description: Complete learning assessment and assess knowledge base.  Interventions:  - Provide teaching at level of understanding  - Provide teaching via preferred learning methods  Outcome: Progressing

## 2025-05-24 NOTE — ASSESSMENT & PLAN NOTE
Presented to ED with tiredness and found to be in A-fib  Rate on arrival 150-170s, given bolus 0.25/KG and second bolus 0.35 mg/KG in ED  First EKG showed A-fib with heart rate 159, second EKG showed sinus tach with heart rate 144 (sinus rhythm replaced A-fib)  Currently on Cardizem drip at 7.5 mg/hour  Patient with no known history of atrial fibrillation  Current home regimen: HCTZ and lisinopril for BP, not on beta-blocker or CCB.  No recent echocardiogram  TSH WNL, Trop 0hr 19, trend  BNP 200s  Telemetry showed atrial fibrillation  [id number]= 3  Anticoagulation with heparin drip in ED, will eventually transition to DOAC  Rate control with: Cardizem drip  Cardiology consult recommendations appreciated.  Recommended Lopressor 25 twice daily with holding parameters, IV Lopressor 5 mg as needed  Patient received 1 L IV fluid bolus in ED for soft BP  Will give another 1 L IV fluid bolus due to BP 90s/60s  Continue telemetry monitoring  Echocardiogram ordered

## 2025-05-24 NOTE — ASSESSMENT & PLAN NOTE
"Patient came to [facility] emergency department with complaints of chest pressure upon waking this morning along with \" not feeling right\".  Patient reports chest pressure progressively got worse and was associated with bilateral arm numbness, lower palate discomfort and diaphoresis.  She denies any chest pain or pressure at this time or prior to this morning.    Chest x-ray: Mild pulmonary edema, evidence of cardiomegaly  EKG revealed atrial fibrillation with heart rates in the 150s  BMP: 298 -examines euvolemic, will continue to monitor  TSH 1.5  Troponin: 19<177  -suspect non-MI related troponin elevation in the setting of tachycardia    [id number] score of 3   Anticoagulated with heparin in the emergency department, would transition to DOAC when appropriate.  Diltiazem bolus and drip initiated in the emergency department for rate control  Will add metoprolol p.o. with IV bolus  "

## 2025-05-25 ENCOUNTER — PREP FOR PROCEDURE (OUTPATIENT)
Dept: CARDIOLOGY CLINIC | Facility: CLINIC | Age: 70
End: 2025-05-25

## 2025-05-25 DIAGNOSIS — R79.89 ELEVATED TROPONIN: Primary | ICD-10-CM

## 2025-05-25 PROBLEM — R07.9 CHEST PAIN: Status: ACTIVE | Noted: 2025-05-25

## 2025-05-25 LAB
ALBUMIN SERPL BCG-MCNC: 3.9 G/DL (ref 3.5–5)
ALP SERPL-CCNC: 69 U/L (ref 34–104)
ALT SERPL W P-5'-P-CCNC: 35 U/L (ref 7–52)
ANION GAP SERPL CALCULATED.3IONS-SCNC: 4 MMOL/L (ref 4–13)
APTT PPP: 46 SECONDS (ref 23–34)
APTT PPP: 86 SECONDS (ref 23–34)
AST SERPL W P-5'-P-CCNC: 36 U/L (ref 13–39)
ATRIAL RATE: 144 BPM
ATRIAL RATE: 144 BPM
ATRIAL RATE: 156 BPM
ATRIAL RATE: 63 BPM
BILIRUB SERPL-MCNC: 0.6 MG/DL (ref 0.2–1)
BUN SERPL-MCNC: 14 MG/DL (ref 5–25)
CALCIUM SERPL-MCNC: 8.8 MG/DL (ref 8.4–10.2)
CHLORIDE SERPL-SCNC: 111 MMOL/L (ref 96–108)
CHOLEST SERPL-MCNC: 189 MG/DL (ref ?–200)
CO2 SERPL-SCNC: 24 MMOL/L (ref 21–32)
CREAT SERPL-MCNC: 0.63 MG/DL (ref 0.6–1.3)
ERYTHROCYTE [DISTWIDTH] IN BLOOD BY AUTOMATED COUNT: 13.1 % (ref 11.6–15.1)
EST. AVERAGE GLUCOSE BLD GHB EST-MCNC: 114 MG/DL
GFR SERPL CREATININE-BSD FRML MDRD: 93 ML/MIN/1.73SQ M
GLUCOSE SERPL-MCNC: 109 MG/DL (ref 65–140)
HBA1C MFR BLD: 5.6 %
HCT VFR BLD AUTO: 37.1 % (ref 34.8–46.1)
HDLC SERPL-MCNC: 80 MG/DL
HGB BLD-MCNC: 12.4 G/DL (ref 11.5–15.4)
LDLC SERPL CALC-MCNC: 97 MG/DL (ref 0–100)
MCH RBC QN AUTO: 32.1 PG (ref 26.8–34.3)
MCHC RBC AUTO-ENTMCNC: 33.4 G/DL (ref 31.4–37.4)
MCV RBC AUTO: 96 FL (ref 82–98)
P AXIS: 70 DEGREES
P AXIS: 73 DEGREES
PLATELET # BLD AUTO: 194 THOUSANDS/UL (ref 149–390)
PMV BLD AUTO: 10.4 FL (ref 8.9–12.7)
POTASSIUM SERPL-SCNC: 4 MMOL/L (ref 3.5–5.3)
PR INTERVAL: 116 MS
PR INTERVAL: 126 MS
PROT SERPL-MCNC: 6 G/DL (ref 6.4–8.4)
QRS AXIS: 25 DEGREES
QRS AXIS: 29 DEGREES
QRS AXIS: 31 DEGREES
QRS AXIS: 32 DEGREES
QRSD INTERVAL: 114 MS
QRSD INTERVAL: 118 MS
QRSD INTERVAL: 80 MS
QRSD INTERVAL: 86 MS
QT INTERVAL: 266 MS
QT INTERVAL: 274 MS
QT INTERVAL: 282 MS
QT INTERVAL: 440 MS
QTC INTERVAL: 411 MS
QTC INTERVAL: 424 MS
QTC INTERVAL: 458 MS
QTC INTERVAL: 471 MS
RBC # BLD AUTO: 3.86 MILLION/UL (ref 3.81–5.12)
SODIUM SERPL-SCNC: 139 MMOL/L (ref 135–147)
T WAVE AXIS: 214 DEGREES
T WAVE AXIS: 221 DEGREES
T WAVE AXIS: 226 DEGREES
T WAVE AXIS: 94 DEGREES
TRIGL SERPL-MCNC: 62 MG/DL (ref ?–150)
VENTRICULAR RATE: 144 BPM
VENTRICULAR RATE: 144 BPM
VENTRICULAR RATE: 159 BPM
VENTRICULAR RATE: 69 BPM
WBC # BLD AUTO: 8.37 THOUSAND/UL (ref 4.31–10.16)

## 2025-05-25 PROCEDURE — 93010 ELECTROCARDIOGRAM REPORT: CPT | Performed by: INTERNAL MEDICINE

## 2025-05-25 PROCEDURE — 99232 SBSQ HOSP IP/OBS MODERATE 35: CPT

## 2025-05-25 PROCEDURE — 85730 THROMBOPLASTIN TIME PARTIAL: CPT | Performed by: INTERNAL MEDICINE

## 2025-05-25 PROCEDURE — 80061 LIPID PANEL: CPT

## 2025-05-25 PROCEDURE — 80053 COMPREHEN METABOLIC PANEL: CPT | Performed by: INTERNAL MEDICINE

## 2025-05-25 PROCEDURE — 85027 COMPLETE CBC AUTOMATED: CPT | Performed by: INTERNAL MEDICINE

## 2025-05-25 PROCEDURE — 83036 HEMOGLOBIN GLYCOSYLATED A1C: CPT

## 2025-05-25 RX ORDER — FUROSEMIDE 10 MG/ML
20 INJECTION INTRAMUSCULAR; INTRAVENOUS ONCE
Status: COMPLETED | OUTPATIENT
Start: 2025-05-25 | End: 2025-05-25

## 2025-05-25 RX ORDER — ATORVASTATIN CALCIUM 80 MG/1
80 TABLET, FILM COATED ORAL
Status: DISCONTINUED | OUTPATIENT
Start: 2025-05-25 | End: 2025-05-28 | Stop reason: HOSPADM

## 2025-05-25 RX ORDER — METOPROLOL TARTRATE 50 MG
50 TABLET ORAL EVERY 12 HOURS SCHEDULED
Status: DISCONTINUED | OUTPATIENT
Start: 2025-05-25 | End: 2025-05-25

## 2025-05-25 RX ADMIN — APIXABAN 5 MG: 5 TABLET, FILM COATED ORAL at 10:11

## 2025-05-25 RX ADMIN — FUROSEMIDE 20 MG: 10 INJECTION, SOLUTION INTRAVENOUS at 12:37

## 2025-05-25 RX ADMIN — APIXABAN 5 MG: 5 TABLET, FILM COATED ORAL at 21:26

## 2025-05-25 RX ADMIN — ATORVASTATIN CALCIUM 80 MG: 80 TABLET, FILM COATED ORAL at 16:24

## 2025-05-25 RX ADMIN — HEPARIN SODIUM 1800 UNITS: 1000 INJECTION, SOLUTION INTRAVENOUS; SUBCUTANEOUS at 01:31

## 2025-05-25 RX ADMIN — SODIUM CHLORIDE, SODIUM GLUCONATE, SODIUM ACETATE, POTASSIUM CHLORIDE, MAGNESIUM CHLORIDE, SODIUM PHOSPHATE, DIBASIC, AND POTASSIUM PHOSPHATE 100 ML/HR: .53; .5; .37; .037; .03; .012; .00082 INJECTION, SOLUTION INTRAVENOUS at 04:03

## 2025-05-25 RX ADMIN — METOPROLOL TARTRATE 37.5 MG: 25 TABLET, FILM COATED ORAL at 21:26

## 2025-05-25 RX ADMIN — METOPROLOL TARTRATE 25 MG: 25 TABLET, FILM COATED ORAL at 07:45

## 2025-05-25 RX ADMIN — NICOTINE 1 PATCH: 21 PATCH, EXTENDED RELEASE TRANSDERMAL at 07:46

## 2025-05-25 NOTE — PROGRESS NOTES
Progress Note - Hospitalist   Name: Hyun Anthony 69 y.o. female I MRN: [id number]  Unit/Bed#: E4 -01 I Date of Admission: 5/24/2025   Date of Service: 5/25/2025 I Hospital Day: 1    Assessment & Plan  New onset a-fib (HCC)  Presented with fatigue and noted to be in atrial fibrillation with RVR  No known history of atrial fibrillation, KAREN, no recent illness, no alcohol use  Placed on Cardizem drip and Lopressor 25 mg twice daily   Increasing dose to 37.5 mg twice daily for better BP control.  Patient has returned to sinus rhythm  TSH within normal limits  BNP is elevated to 298 and chest x-ray with congestion, undergoing 1 dose of Lasix today  Follow-up echocardiogram, no recent  [id number]= 3, started on Eliquis which was price check.  Continue telemetry monitoring  Chest pain  Patient reported symptoms of severe chest pressure and diaphoresis prior to presentation to Dr. Watson  Troponin last peaked at 387 when checked and EKG with nonspecific ST and T wave abnormalities with no EKG previously  Due to symptoms and above findings with risk factors including ongoing tobacco abuse and family history of CAD cardiology planning for cardiac catheterization Tuesday 5/27  Discussed with Dr. Watson.  Continue Eliquis with last dose 5/26 AM. Then plan to start heparin gtt 5/26 evening at 2100. Hold DAPT load at this time.  Currently chest pain free  Continue beta-blocker with Lopressor, high-dose atorvastatin  Update lipid panel, A1c  Check high-sensitivity troponin in a.m.  Essential hypertension  Holding PTA lisinopril and HCTZ  Was hypotensive and started on fluids, with mild rales on exam and pulmonary venous congestion.  Fluids stopped  BP elevated this morning.  Increasing Lopressor for better BP control from 25 mg twice daily to 37.5 mg twice daily  Would continue to hold lisinopril and HCTZ due to anticipated contrast on Tuesday with cardiac catheterization  Consider addition of amlodipine to BP remains  elevated  Undergoing dose of Lasix per cardiology  Hyperlipidemia  Patient was on rosuvastatin 10 mg at home, which was substituted with pravastatin 80 mg daily  Change over to high-dose atorvastatin 80 mg today  Update lipid panel  Tobacco use  Continue nicotine replacement therapy, encourage smoking cessation    VTE Pharmacologic Prophylaxis:   Eliquis    Mobility:   Basic Mobility Inpatient Raw Score: 24  JH-HLM Goal: 8: Walk 250 feet or more  JH-HLM Achieved: 7: Walk 25 feet or more  JH-HLM Goal achieved. Continue to encourage appropriate mobility.    Patient Centered Rounds: I performed bedside rounds with nursing staff today.   Discussions with Specialists or Other Care Team Provider: Cardiology    Education and Discussions with Family / Patient: Updated  (daughter) at bedside.    Current Length of Stay: 1 day(s)  Current Patient Status: Inpatient   Certification Statement: The patient will continue to require additional inpatient hospital stay due to cardiac catheterization evaluation for Tuesday, echocardiogram, heart rate and rhythm monitoring with new onset atrial fibrillation  Discharge Plan: Anticipate discharge in 48-72 hrs to home.    Code Status: Level 1 - Full Code    Subjective   Patient seen and examined.  She reports she feels tired today but otherwise denies any chest pain, heart palpitations, shortness of breath, dizziness or lightheadedness.  No recent sick contacts.  Denies any snoring at home.  No history of atrial fibrillation.  We reviewed anticoagulation with Eliquis and the anticipated price and she is agreeable to this.    Objective :  Temp:  [97.4 °F (36.3 °C)-98.7 °F (37.1 °C)] 98.7 °F (37.1 °C)  HR:  [] 69  BP: ()/() 168/103  Resp:  [18-20] 18  SpO2:  [94 %-98 %] 98 %  O2 Device: None (Room air)    Body mass index is 24.44 kg/m².     Input and Output Summary (last 24 hours):     Intake/Output Summary (Last 24 hours) at 5/25/2025 1221  Last data filed at  5/24/2025 1549  Gross per 24 hour   Intake 2000 ml   Output --   Net 2000 ml       Physical Exam  Vitals and nursing note reviewed.   Constitutional:       General: She is not in acute distress.     Appearance: Normal appearance. She is not ill-appearing, toxic-appearing or diaphoretic.     Cardiovascular:      Rate and Rhythm: Normal rate and regular rhythm.      Heart sounds: Murmur heard.   Pulmonary:      Effort: Pulmonary effort is normal. No respiratory distress.      Breath sounds: Rales (bases) present.   Abdominal:      General: Bowel sounds are normal. There is no distension.      Palpations: Abdomen is soft. There is no fluid wave.      Tenderness: There is no abdominal tenderness.     Musculoskeletal:      Right lower leg: No edema.      Left lower leg: No edema.     Skin:     General: Skin is warm and dry.     Neurological:      Mental Status: She is alert and oriented to person, place, and time. Mental status is at baseline.     Psychiatric:         Mood and Affect: Mood normal.         Behavior: Behavior normal.       Telemetry:  Telemetry Orders (From admission, onward)               24 Hour Telemetry Monitoring  Continuous x 24 Hours (Telem)        Expiring   Question:  Reason for 24 Hour Telemetry  Answer:  Arrhythmias requiring acute medical intervention / PPM or ICD malfunction                     Telemetry Reviewed: Normal Sinus Rhythm  Indication for Continued Telemetry Use: Arrthymias requiring medical therapy      Lab Results: I have reviewed the following results:   Results from last 7 days   Lab Units 05/25/25  0854 05/24/25  1026   WBC Thousand/uL 8.37 6.01   HEMOGLOBIN g/dL 12.4 13.4   HEMATOCRIT % 37.1 40.6   PLATELETS Thousands/uL 194 220   SEGS PCT %  --  47   LYMPHO PCT %  --  42   MONO PCT %  --  7   EOS PCT %  --  3     Results from last 7 days   Lab Units 05/25/25  0854   SODIUM mmol/L 139   POTASSIUM mmol/L 4.0   CHLORIDE mmol/L 111*   CO2 mmol/L 24   BUN mg/dL 14   CREATININE  mg/dL 0.63   ANION GAP mmol/L 4   CALCIUM mg/dL 8.8   ALBUMIN g/dL 3.9   TOTAL BILIRUBIN mg/dL 0.60   ALK PHOS U/L 69   ALT U/L 35   AST U/L 36   GLUCOSE RANDOM mg/dL 109     Results from last 7 days   Lab Units 05/24/25  1026   INR  1.01     Last 24 Hours Medication List:     Current Facility-Administered Medications:     aluminum-magnesium hydroxide-simethicone (MAALOX) oral suspension 30 mL, Q6H PRN    apixaban (ELIQUIS) tablet 5 mg, BID    atorvastatin (LIPITOR) tablet 80 mg, Daily With Dinner    diltiazem (CARDIZEM) 125 mg in sodium chloride 0.9 % 125 mL infusion, Titrated, Last Rate: Stopped (05/24/25 1807)    furosemide (LASIX) injection 20 mg, Once    metoprolol tartrate (LOPRESSOR) tablet 25 mg, Q12H ROMELIA    nicotine (NICODERM CQ) 21 mg/24 hr TD 24 hr patch 1 patch, Daily    ondansetron (ZOFRAN) injection 4 mg, Q6H PRN    polyethylene glycol (MIRALAX) packet 17 g, Daily PRN    Administrative Statements   Today, Patient Was Seen By: Wander Cintron PA-C    **Please Note: This note may have been constructed using a voice recognition system.**

## 2025-05-25 NOTE — ASSESSMENT & PLAN NOTE
Holding PTA lisinopril and HCTZ  Was hypotensive and started on fluids, with mild rales on exam and pulmonary venous congestion.  Fluids stopped  BP elevated this morning.  Increasing Lopressor for better BP control from 25 mg twice daily to 37.5 mg twice daily  Would continue to hold lisinopril and HCTZ due to anticipated contrast on Tuesday with cardiac catheterization  Consider addition of amlodipine to BP remains elevated  Undergoing dose of Lasix per cardiology

## 2025-05-25 NOTE — ASSESSMENT & PLAN NOTE
Patient reported symptoms of severe chest pressure and diaphoresis prior to presentation to Dr. Watson  Troponin last peaked at 387 when checked and EKG with nonspecific ST and T wave abnormalities with no EKG previously  Due to symptoms and above findings with risk factors including ongoing tobacco abuse and family history of CAD cardiology planning for cardiac catheterization Tuesday 5/27  Discussed with Dr. Watson.  Continue Eliquis with last dose 5/26 AM. Then plan to start heparin gtt 5/26 evening at 2100. Hold DAPT load at this time.  Currently chest pain free  Continue beta-blocker with Lopressor, high-dose atorvastatin  Update lipid panel, A1c  Check high-sensitivity troponin in a.m.

## 2025-05-25 NOTE — PLAN OF CARE
Problem: PAIN - ADULT  Goal: Verbalizes/displays adequate comfort level or baseline comfort level  Description: Interventions:  - Encourage patient to monitor pain and request assistance  - Assess pain using appropriate pain scale  - Administer analgesics as ordered based on type and severity of pain and evaluate response  - Implement non-pharmacological measures as appropriate and evaluate response  - Consider cultural and social influences on pain and pain management  - Notify physician/advanced practitioner if interventions unsuccessful or patient reports new pain  - Educate patient/family on pain management process including their role and importance of  reporting pain   - Provide non-pharmacologic/complimentary pain relief interventions  Outcome: Progressing     Problem: INFECTION - ADULT  Goal: Absence or prevention of progression during hospitalization  Description: INTERVENTIONS:  - Assess and monitor for signs and symptoms of infection  - Monitor lab/diagnostic results  - Monitor all insertion sites, i.e. indwelling lines, tubes, and drains  - Monitor endotracheal if appropriate and nasal secretions for changes in amount and color  - [place] appropriate cooling/warming therapies per order  - Administer medications as ordered  - Instruct and encourage patient and family to use good hand hygiene technique  - Identify and instruct in appropriate isolation precautions for identified infection/condition  Outcome: Progressing  Goal: Absence of fever/infection during neutropenic period  Description: INTERVENTIONS:  - Monitor WBC  - Perform strict hand hygiene  - Limit to healthy visitors only  - No plants, dried, fresh or silk flowers with tavarez in patient room  Outcome: Progressing     Problem: SAFETY ADULT  Goal: Patient will remain free of falls  Description: INTERVENTIONS:  - Educate patient/family on patient safety including physical limitations  - Instruct patient to call for assistance with activity   -  Consider consulting OT/PT to assist with strengthening/mobility based on AM PAC & JH-HLM score  - Consult OT/PT to assist with strengthening/mobility   - Keep Call bell within reach  - Keep bed low and locked with side rails adjusted as appropriate  - Keep care items and personal belongings within reach  - Initiate and maintain comfort rounds    - Consider moving patient to room near nurses station  Outcome: Progressing  Goal: Maintain or return to baseline ADL function  Description: INTERVENTIONS:  -  Assess patient's ability to carry out ADLs; assess patient's baseline for ADL function and identify physical deficits which impact ability to perform ADLs (bathing, care of mouth/teeth, toileting, grooming, dressing, etc.)  - Assess/evaluate cause of self-care deficits   - Assess range of motion  - Assess patient's mobility; develop plan if impaired  - Assess patient's need for assistive devices and provide as appropriate  - Encourage maximum independence but intervene and supervise when necessary  - Involve family in performance of ADLs  - Assess for home care needs following discharge   - Consider OT consult to assist with ADL evaluation and planning for discharge  - Provide patient education as appropriate  - Monitor functional capacity and physical performance, use of AM PAC & JH-HLM   - Monitor gait, balance and fatigue with ambulation    Outcome: Progressing  Goal: Maintains/Returns to pre admission functional level  Description: INTERVENTIONS:  - Perform AM-PAC 6 Click Basic Mobility/ Daily Activity assessment daily.  - Set and communicate daily mobility goal to care team and patient/family/caregiver.   - Collaborate with rehabilitation services on mobility goals if consulted  - Ambulate patient 3 times a day  - Out of bed to chair 3 times a day   - Out of bed for meals 3 times a day  - Out of bed for toileting  - Record patient progress and toleration of activity level   Outcome: Progressing     Problem:  DISCHARGE PLANNING  Goal: Discharge to home or other facility with appropriate resources  Description: INTERVENTIONS:  - Identify barriers to discharge w/patient and caregiver  - Arrange for needed discharge resources and transportation as appropriate  - Identify discharge learning needs (meds, wound care, etc.)  - Arrange for interpretive services to assist at discharge as needed  - Refer to Case Management Department for coordinating discharge planning if the patient needs post-hospital services based on physician/advanced practitioner order or complex needs related to functional status, cognitive ability, or social support system  Outcome: Progressing     Problem: Knowledge Deficit  Goal: Patient/family/caregiver demonstrates understanding of disease process, treatment plan, medications, and discharge instructions  Description: Complete learning assessment and assess knowledge base.  Interventions:  - Provide teaching at level of understanding  - Provide teaching via preferred learning methods  Outcome: Progressing

## 2025-05-25 NOTE — ASSESSMENT & PLAN NOTE
"Patient came to [facility] emergency department with complaints of chest pressure upon waking this morning along with \" not feeling right\".  Patient reports chest pressure progressively got worse and was associated with bilateral arm numbness, lower palate discomfort and diaphoresis.  She denies any chest pain or pressure at this time or prior to this morning.    Chest x-ray: Mild pulmonary edema, evidence of cardiomegaly  EKG revealed atrial fibrillation with heart rates in the 150s  BMP: 298 -examines euvolemic, will continue to monitor  TSH 1.5  Troponin: 19<177  -suspect non-MI related troponin elevation in the setting of tachycardia    [id number] score of 3   Anticoagulated with heparin in the emergency department, would transition to DOAC when appropriate.  Diltiazem bolus and drip initiated in the emergency department for rate control  Will add metoprolol p.o. with IV bolus  "

## 2025-05-25 NOTE — ASSESSMENT & PLAN NOTE
Presented with fatigue and noted to be in atrial fibrillation with RVR  No known history of atrial fibrillation, KAREN, no recent illness, no alcohol use  Placed on Cardizem drip and Lopressor 25 mg twice daily   Increasing dose to 37.5 mg twice daily for better BP control.  Patient has returned to sinus rhythm  TSH within normal limits  BNP is elevated to 298 and chest x-ray with congestion, undergoing 1 dose of Lasix today  Follow-up echocardiogram, no recent  [id number]= 3, started on Eliquis which was price check.  Continue telemetry monitoring

## 2025-05-25 NOTE — ASSESSMENT & PLAN NOTE
Patient was on rosuvastatin 10 mg at home, which was substituted with pravastatin 80 mg daily  Change over to high-dose atorvastatin 80 mg today  Update lipid panel

## 2025-05-26 ENCOUNTER — APPOINTMENT (INPATIENT)
Dept: NON INVASIVE DIAGNOSTICS | Facility: HOSPITAL | Age: 70
DRG: 287 | End: 2025-05-26
Payer: COMMERCIAL

## 2025-05-26 LAB
ANION GAP SERPL CALCULATED.3IONS-SCNC: 5 MMOL/L (ref 4–13)
APTT PPP: 28 SECONDS (ref 23–34)
APTT PPP: 52 SECONDS (ref 23–34)
BUN SERPL-MCNC: 16 MG/DL (ref 5–25)
CALCIUM SERPL-MCNC: 8.9 MG/DL (ref 8.4–10.2)
CARDIAC TROPONIN I PNL SERPL HS: 289 NG/L (ref 8–18)
CHLORIDE SERPL-SCNC: 109 MMOL/L (ref 96–108)
CO2 SERPL-SCNC: 25 MMOL/L (ref 21–32)
CREAT SERPL-MCNC: 0.61 MG/DL (ref 0.6–1.3)
ERYTHROCYTE [DISTWIDTH] IN BLOOD BY AUTOMATED COUNT: 12.9 % (ref 11.6–15.1)
GFR SERPL CREATININE-BSD FRML MDRD: 94 ML/MIN/1.73SQ M
GLUCOSE SERPL-MCNC: 90 MG/DL (ref 65–140)
HCT VFR BLD AUTO: 35.3 % (ref 34.8–46.1)
HGB BLD-MCNC: 11.6 G/DL (ref 11.5–15.4)
INR PPP: 1 (ref 0.85–1.19)
MCH RBC QN AUTO: 32.2 PG (ref 26.8–34.3)
MCHC RBC AUTO-ENTMCNC: 32.9 G/DL (ref 31.4–37.4)
MCV RBC AUTO: 98 FL (ref 82–98)
PLATELET # BLD AUTO: 184 THOUSANDS/UL (ref 149–390)
PMV BLD AUTO: 10.6 FL (ref 8.9–12.7)
POTASSIUM SERPL-SCNC: 3.7 MMOL/L (ref 3.5–5.3)
PROTHROMBIN TIME: 13.4 SECONDS (ref 12.3–15)
RBC # BLD AUTO: 3.6 MILLION/UL (ref 3.81–5.12)
SODIUM SERPL-SCNC: 139 MMOL/L (ref 135–147)
WBC # BLD AUTO: 5.56 THOUSAND/UL (ref 4.31–10.16)

## 2025-05-26 PROCEDURE — 93306 TTE W/DOPPLER COMPLETE: CPT | Performed by: INTERNAL MEDICINE

## 2025-05-26 PROCEDURE — 85730 THROMBOPLASTIN TIME PARTIAL: CPT | Performed by: PHYSICIAN ASSISTANT

## 2025-05-26 PROCEDURE — 93306 TTE W/DOPPLER COMPLETE: CPT

## 2025-05-26 PROCEDURE — 85610 PROTHROMBIN TIME: CPT | Performed by: PHYSICIAN ASSISTANT

## 2025-05-26 PROCEDURE — 85027 COMPLETE CBC AUTOMATED: CPT

## 2025-05-26 PROCEDURE — 85730 THROMBOPLASTIN TIME PARTIAL: CPT | Performed by: INTERNAL MEDICINE

## 2025-05-26 PROCEDURE — 99232 SBSQ HOSP IP/OBS MODERATE 35: CPT | Performed by: INTERNAL MEDICINE

## 2025-05-26 PROCEDURE — 99233 SBSQ HOSP IP/OBS HIGH 50: CPT | Performed by: PHYSICIAN ASSISTANT

## 2025-05-26 PROCEDURE — 84484 ASSAY OF TROPONIN QUANT: CPT

## 2025-05-26 PROCEDURE — 80048 BASIC METABOLIC PNL TOTAL CA: CPT

## 2025-05-26 RX ORDER — HEPARIN SODIUM 1000 [USP'U]/ML
3600 INJECTION, SOLUTION INTRAVENOUS; SUBCUTANEOUS EVERY 6 HOURS PRN
Status: DISCONTINUED | OUTPATIENT
Start: 2025-05-26 | End: 2025-05-27

## 2025-05-26 RX ORDER — HEPARIN SODIUM 10000 [USP'U]/100ML
3-20 INJECTION, SOLUTION INTRAVENOUS
Status: DISCONTINUED | OUTPATIENT
Start: 2025-05-26 | End: 2025-05-27

## 2025-05-26 RX ORDER — HEPARIN SODIUM 1000 [USP'U]/ML
1800 INJECTION, SOLUTION INTRAVENOUS; SUBCUTANEOUS EVERY 6 HOURS PRN
Status: DISCONTINUED | OUTPATIENT
Start: 2025-05-26 | End: 2025-05-27

## 2025-05-26 RX ORDER — HEPARIN SODIUM 1000 [USP'U]/ML
3600 INJECTION, SOLUTION INTRAVENOUS; SUBCUTANEOUS ONCE
Status: COMPLETED | OUTPATIENT
Start: 2025-05-26 | End: 2025-05-26

## 2025-05-26 RX ADMIN — METOPROLOL TARTRATE 37.5 MG: 25 TABLET, FILM COATED ORAL at 08:05

## 2025-05-26 RX ADMIN — ATORVASTATIN CALCIUM 80 MG: 80 TABLET, FILM COATED ORAL at 16:31

## 2025-05-26 RX ADMIN — HEPARIN SODIUM 3600 UNITS: 1000 INJECTION, SOLUTION INTRAVENOUS; SUBCUTANEOUS at 15:03

## 2025-05-26 RX ADMIN — HEPARIN SODIUM 1800 UNITS: 1000 INJECTION, SOLUTION INTRAVENOUS; SUBCUTANEOUS at 21:32

## 2025-05-26 RX ADMIN — METOPROLOL TARTRATE 37.5 MG: 25 TABLET, FILM COATED ORAL at 20:16

## 2025-05-26 RX ADMIN — HEPARIN SODIUM 12 UNITS/KG/HR: 10000 INJECTION, SOLUTION INTRAVENOUS at 15:02

## 2025-05-26 RX ADMIN — NICOTINE 1 PATCH: 21 PATCH, EXTENDED RELEASE TRANSDERMAL at 08:05

## 2025-05-26 NOTE — ASSESSMENT & PLAN NOTE
Presented with fatigue and noted to be in atrial fibrillation with RVR  No known history of atrial fibrillation, KAREN, no recent illness, no alcohol use  Placed on Cardizem drip and Lopressor 25 mg twice daily   Increasing dose to 37.5 mg twice daily for better BP control.  Patient has returned to sinus rhythm  TSH within normal limits  BNP is elevated to 298 and chest x-ray with congestion,s/p IV Lasix x1 with improvement in peripheral edema per pt  Follow-up echocardiogram, no recent  [id number]= 3, started on Eliquis which was price checked (currently on hold and on hep gtt pdg cath then to resume post cath)  Continue telemetry monitoring

## 2025-05-26 NOTE — ASSESSMENT & PLAN NOTE
Patient reported symptoms of severe chest pressure and diaphoresis prior to presentation to Dr. Watson  Troponin last peaked at 387 down to 289 this AM, EKG with nonspecific ST and T wave abnormalities at peak trop level with no EKG previously  Due to symptoms and above findings with risk factors including ongoing tobacco abuse and family history of CAD cardiology planning for cardiac catheterization Tuesday 5/27  Lipid panel: LDL 97, HDL 80, triglycerides 62, cholesterol 189  A1c 5.6  Discussed with Dr. Watson 5/25: Eliquis held AM of 5/26 and started back on hep gtt in anticipation of cath, then should resume OAC post cath  Held DAPT load   Currently chest pain free  Continue beta-blocker with Lopressor, high-dose atorvastatin

## 2025-05-26 NOTE — UTILIZATION REVIEW
Initial Clinical Review    Admission: Date/Time/Statement:   Admission Orders (From admission, onward)       Ordered        05/24/25 1258  INPATIENT ADMISSION  Once                          Orders Placed This Encounter   Procedures    INPATIENT ADMISSION     Standing Status:   Standing     Number of Occurrences:   1     Level of Care:   Level 2 Stepdown / HOT [14]     Estimated length of stay:   More than 2 Midnights     Certification:   I certify that inpatient services are medically necessary for this patient for a duration of greater than two midnights. See H&P and MD Progress Notes for additional information about the patient's course of treatment.     ED Arrival Information       Expected   -    Arrival   5/24/2025 10:18    Acuity   Emergent              Means of arrival   Ambulance    Escorted by   [facility] ([facility])    Service   Hospitalist    Admission type   Emergency              Arrival complaint   Chest Pressure             Chief Complaint   Patient presents with    Atrial Fibrillation     Pt woke up approx 0800 with chest pressure. Pt states it feels like something is sitting on her chest. Per EMS Pt HR ranging between 130-170 pre hospital       Initial Presentation: 69 y.o. female presents to ED via  EMS  from home with fatigue, feels tired.  Felt  something  wasn't right.  PMH is  HTN and HLD.  + smoker    No alcohol in past  2 years.   Found in  Afib  in ED with heart rate  up to 150's- 170's.   Started  on cardizem drip in  ED.    CHA2DS2-VASC=3.  EKG  shows  Afib, HR  159, 2nd  EKG shows  ST  with .  Admit  Ip with new onset Afib and plan is  cardizem drip, 2 DE, tele, S/P 1 L  IVF  bolus in  ED, cardiology consult, IV lopressor as needed and continue home meds.    Cardiology consult  Now  back  in SR.   Giving  1 dose  IV lasix given  elevated  BNP, mild crackles on exam.  Hold Eliquis  for  coronary angiogram  Tues 5/27.  Monitor on tele.   Continue cardizem drip.   Start  metoprolol.      Anticipated Length of Stay/Certification Statement: Patient will be admitted on an Inpatient basis with an anticipated length of stay of more than 2 midnights.   Justification for Hospital Stay: A-fib with RVR     Date:    5/25   Day 2:   Continue  Eliquis  until  5/26, then start  IV heaprin.   Cardiac cath  5/27.  Troponin peak  387.  Feels  tired, no  other complaints.   D/C   cardizem drip.    Date:   5/26  Day 3: Has surpassed a 2nd midnight with active treatments and services.   Feels well.  No complaints.   Wait  cardiac cath  5/27.   Continue current meds.    ED Treatment-Medication Administration from 05/24/2025 1018 to 05/24/2025 1808         Date/Time Order Dose Route Action     05/24/2025 1100 diltiazem (CARDIZEM) 125 mg in sodium chloride 0.9 % 125 mL infusion 5 mg/hr Intravenous New Bag     05/24/2025 1130 diltiazem (CARDIZEM) 125 mg in sodium chloride 0.9 % 125 mL infusion 7.5 mg/hr Intravenous New Bag     05/24/2025 1500 diltiazem (CARDIZEM) 125 mg in sodium chloride 0.9 % 125 mL infusion 10 mg/hr Intravenous Rate/Dose Change     05/24/2025 1527 diltiazem (CARDIZEM) 125 mg in sodium chloride 0.9 % 125 mL infusion 7.5 mg/hr Intravenous Rate/Dose Change     05/24/2025 1632 diltiazem (CARDIZEM) 125 mg in sodium chloride 0.9 % 125 mL infusion 5 mg/hr Intravenous Rate/Dose Change     05/24/2025 1708 diltiazem (CARDIZEM) 125 mg in sodium chloride 0.9 % 125 mL infusion 2.5 mg/hr Intravenous Rate/Dose Change     05/24/2025 1029 diltiazem (CARDIZEM) injection 15 mg 15 mg Intravenous Given     05/24/2025 1113 heparin (porcine) injection 3,600 Units 3,600 Units Intravenous Given     05/24/2025 1112 heparin (porcine) 25,000 units in 0.45% NaCl 250 mL infusion (premix) 12 Units/kg/hr Intravenous New Bag     05/24/2025 1144 diltiazem (CARDIZEM) injection 20 mg 20 mg Intravenous Given     05/24/2025 1239 sodium chloride 0.9 % bolus 1,000 mL 1,000 mL Intravenous New Bag     05/24/2025 3169  pravastatin (PRAVACHOL) tablet 80 mg 80 mg Oral Given     05/24/2025 1620 metoprolol (LOPRESSOR) injection 5 mg 5 mg Intravenous Given     05/24/2025 1401 multi-electrolyte (Plasmalyte-A/Isolyte-S PH 7.4/Normosol-R) IV bolus 1,000 mL 1,000 mL Intravenous New Bag     05/24/2025 1454 nicotine (NICODERM CQ) 21 mg/24 hr TD 24 hr patch 1 patch 1 patch Transdermal Medication Applied     05/24/2025 1549 multi-electrolyte (Plasmalyte-A/Isolyte-S PH 7.4/Normosol-R) IV solution 100 mL/hr Intravenous New Bag            Scheduled Medications:  apixaban, 5 mg, Oral, BID  atorvastatin, 80 mg, Oral, Daily With Dinner  metoprolol tartrate, 37.5 mg, Oral, Q12H ROMELIA  nicotine, 1 patch, Transdermal, Daily      Continuous IV Infusions:  Cardizem drip - d/c  5/25  IV  heparin - d/c   5/25  IVF  100/hr - d/c  5/25     PRN Meds:  aluminum-magnesium hydroxide-simethicone, 30 mL, Oral, Q6H PRN  ondansetron, 4 mg, Intravenous, Q6H PRN  polyethylene glycol, 17 g, Oral, Daily PRN      ED Triage Vitals   Temperature Pulse Respirations Blood Pressure SpO2 Pain Score   05/24/25 1022 05/24/25 1022 05/24/25 1022 05/24/25 1022 05/24/25 1022 05/24/25 1617   98.4 °F (36.9 °C) (!) 156 (!) 24 117/56 99 % No Pain     Weight (last 2 days)       Date/Time Weight    05/24/25 1022 60.6 (133.6)            Vital Signs (last 3 days)       Date/Time Temp Pulse Resp BP MAP (mmHg) SpO2 O2 Device Patient Position - Orthostatic VS Monique Coma Scale Score Pain    05/26/25 1127 97.6 °F (36.4 °C) 70 18 155/87 -- 97 % None (Room air) Lying -- --    05/26/25 1120 -- -- -- -- -- -- -- -- 15 No Pain    05/26/25 0900 -- -- -- -- -- -- -- -- 15 No Pain    05/26/25 0759 97.4 °F (36.3 °C) 64 18 155/81 112 96 % None (Room air) Lying -- --    05/26/25 0249 97.6 °F (36.4 °C) 61 18 129/67 92 96 % None (Room air) Lying -- --    05/25/25 2227 97.8 °F (36.6 °C) 72 18 154/83 112 97 % None (Room air) Sitting -- --    05/25/25 [facility] -- -- -- -- -- -- None (Room air) -- 15 No Pain     05/25/25 1942 98.6 °F (37 °C) 80 18 184/90 127 97 % None (Room air) Sitting -- --    05/25/25 1604 98.1 °F (36.7 °C) 84 16 161/78 112 95 % None (Room air) Sitting -- --    05/25/25 1120 98.7 °F (37.1 °C) 69 18 168/103 125 98 % None (Room air) Lying -- --    05/25/25 0800 -- -- -- -- -- -- -- -- 15 No Pain    05/25/25 0740 98 °F (36.7 °C) 73 18 173/99 128 97 % None (Room air) Lying -- --    05/25/25 0430 -- -- -- -- -- -- -- -- 15 --    05/25/25 0356 98.2 °F (36.8 °C) 78 18 145/72 102 95 % None (Room air) Lying -- --    05/25/25 0030 -- -- -- -- -- -- -- -- 15 No Pain    05/24/25 2359 98.3 °F (36.8 °C) 65 18 166/82 115 96 % None (Room air) Lying -- --    05/24/25 2016 97.4 °F (36.3 °C) 75 18 151/86 104 95 % None (Room air) Lying -- --    05/24/25 1930 -- -- -- -- -- -- -- -- 15 No Pain    05/24/25 1816 97.6 °F (36.4 °C) 66 18 125/72 94 95 % None (Room air) Lying -- --    05/24/25 1745 -- 62 20 123/80 98 -- -- Lying -- --    05/24/25 1730 -- 58 20 124/72 93 97 % None (Room air) Lying -- --    05/24/25 1722 -- 58 -- -- -- -- -- -- -- --    05/24/25 1715 -- 51 20 105/63 79 96 % None (Room air) Lying -- --    05/24/25 1700 -- 69 20 114/65 83 97 % None (Room air) Lying -- --    05/24/25 1630 -- 81 20 92/61 72 94 % -- -- 15 No Pain    05/24/25 1617 -- 144 20 94/53 69 95 % None (Room air) Lying -- No Pain    05/24/25 1600 -- 144 20 104/64 73 94 % None (Room air) Lying -- --    05/24/25 1554 -- -- -- -- -- -- -- -- 15 --    05/24/25 1545 -- 138 18 108/70 81 94 % None (Room air) Sitting -- --    05/24/25 1530 -- 144 20 85/65 71 95 % None (Room air) Lying -- --    05/24/25 1526 -- -- -- 86/63 -- -- -- -- -- --    05/24/25 1515 -- 140 20 91/59 69 95 % None (Room air) Lying -- --    05/24/25 1500 -- 124 20 124/71 87 96 % None (Room air) Lying -- --    05/24/25 1400 -- 143 18 95/69 78 95 % None (Room air) Sitting -- --    05/24/25 1316 -- 101 18 120/69 87 94 % None (Room air) Sitting -- --    05/24/25 1301 -- 141 18 123/87 101 95  % None (Room air) Sitting -- --    05/24/25 1246 -- 141 -- 112/74 90 95 % -- -- -- --    05/24/25 1230 -- 143 18 94/61 73 94 % None (Room air) Sitting -- --    05/24/25 1215 -- 142 18 99/66 77 94 % None (Room air) Sitting -- --    05/24/25 1200 -- 141 18 100/67 77 94 % None (Room air) Sitting -- --    05/24/25 1145 -- 114 18 126/68 92 98 % None (Room air) Sitting -- --    05/24/25 1130 -- 141 18 120/80 93 96 % None (Room air) Sitting -- --    05/24/25 1115 -- 141 18 119/85 98 95 % None (Room air) Sitting -- --    05/24/25 1100 -- 141 -- 116/81 -- -- -- -- -- --    05/24/25 1050 -- -- -- -- -- -- -- -- 15 --    05/24/25 1045 -- 141 23 128/85 101 98 % None (Room air) Sitting -- --    05/24/25 1022 98.4 °F (36.9 °C) 156 24 117/56 -- 99 % None (Room air) Sitting -- --              Pertinent Labs/Diagnostic Test Results:   Radiology:  XR chest 1 view portable   Final Interpretation by Shawna Latif MD (05/24 1405)      Mild pulmonary venous congestion with trace pleural effusions.            Workstation performed: [id number]           Cardiology:  ECG 12 lead   Final Result by Eamon Watson DO (05/25 1138)   Atrial fibrillation   Nonspecific ST and T wave abnormality   Prolonged QT   Abnormal ECG   When compared with ECG of 24-May-2025 14:44, (unconfirmed)   Atrial fibrillation has replaced Sinus rhythm   Vent. rate has decreased by  75 bpm   QRS duration has decreased   T wave inversion no longer evident in Lateral leads   Confirmed by Eamon Watson ([id number]) on 5/25/2025 11:38:51 AM      ECG 12 lead   Final Result by Eamon Watson DO (05/25 3459)   Atrial flutter with 2 to 1 block   Non-specific intra-ventricular conduction delay   ST & T wave abnormality, consider inferolateral ischemia   Abnormal ECG   When compared with ECG of 24-May-2025 12:34, (unconfirmed)   No significant change was found   Confirmed by Eamon Watson ([id number]) on 5/25/2025 11:38:28 AM      ECG 12 lead   Final Result by Eamon Watson DO (05/25 8847)    Atrial flutter with 2:1 A-V conduction   Marked ST abnormality, possible lateral subendocardial injury   Abnormal ECG   When compared with ECG of 24-May-2025 10:22,   ST less depressed in Inferior leads   Confirmed by Eamon Watson ([id number]) on 5/25/2025 11:38:17 AM      ECG 12 lead   Final Result by Eamon Watson DO (05/25 1137)   Atrial fibrillation with rapid ventricular response   Marked ST abnormality, possible inferior subendocardial injury   Abnormal ECG   No previous ECGs available   Confirmed by Eamon Watson ([id number]) on 5/25/2025 11:37:40 AM        GI:          Results from last 7 days   Lab Units 05/26/25  0503 05/25/25  0854 05/24/25  1026   WBC Thousand/uL 5.56 8.37 6.01   HEMOGLOBIN g/dL 11.6 12.4 13.4   HEMATOCRIT % 35.3 37.1 40.6   PLATELETS Thousands/uL 184 194 220   TOTAL NEUT ABS Thousands/µL  --   --  2.83         Results from last 7 days   Lab Units 05/26/25  0503 05/25/25  0854 05/24/25  1239 05/24/25  1026   SODIUM mmol/L 139 139  --  142   POTASSIUM mmol/L 3.7 4.0  --  4.3   CHLORIDE mmol/L 109* 111*  --  112*   CO2 mmol/L 25 24  --  25   ANION GAP mmol/L 5 4  --  5   BUN mg/dL 16 14  --  22   CREATININE mg/dL 0.61 0.63  --  0.82   EGFR ml/min/1.73sq m 94 93  --  74   CALCIUM mg/dL 8.9 8.8  --  9.2   MAGNESIUM mg/dL  --   --  2.0  --      Results from last 7 days   Lab Units 05/25/25  0854 05/24/25  1026   AST U/L 36 24   ALT U/L 35 20   ALK PHOS U/L 69 93   TOTAL PROTEIN g/dL 6.0* 6.0*   ALBUMIN g/dL 3.9 3.9   TOTAL BILIRUBIN mg/dL 0.60 0.40         Results from last 7 days   Lab Units 05/26/25  0503 05/25/25  0854 05/24/25  1026   GLUCOSE RANDOM mg/dL 90 109 121         Results from last 7 days   Lab Units 05/25/25  0854   HEMOGLOBIN A1C % 5.6   EAG mg/dl 114       Results from last 7 days   Lab Units 05/24/25  1458 05/24/25  1239 05/24/25  1026   HS TNI 0HR ng/L  --   --  19   HS TNI 2HR ng/L  --  177*  --    HSTNI D2 ng/L  --  158*  --    HS TNI 4HR ng/L 387*  --   --    HSTNI D4 ng/L 368*   --   --          Results from last 7 days   Lab Units 05/25/25  0854 05/25/25  0046 05/24/25  1719 05/24/25  1026   PROTIME seconds  --   --   --  13.5   INR   --   --   --  1.01   PTT seconds 86* 46* 80* 29     Results from last 7 days   Lab Units 05/24/25  1026   TSH 3RD GENERATON uIU/mL 1.598                     Results from last 7 days   Lab Units 05/24/25  1026   BNP pg/mL 298*                 Past Medical History[1]  Present on Admission:   Essential hypertension   Hyperlipidemia   Tobacco use      Admitting Diagnosis: Chest pressure [R07.89]  New onset a-fib (HCC) [I48.91]  Demand ischemia (HCC) [I24.89]  Atrial fibrillation with rapid ventricular response (HCC) [I48.91]  Age/Sex: 69 y.o. female    Network Utilization Review Department  ATTENTION: Please call with any questions or concerns to [phone] and carefully listen to the prompts so that you are directed to the right person. All voicemails are confidential.   For Discharge needs, contact Care Management DC Support Team at [phone] opt. [phone]  Send all requests for admission clinical reviews, approved or denied determinations and any other requests to dedicated fax number below belonging to the campus where the patient is receiving treatment. List of dedicated fax numbers for the Facilities:  FACILITY NAME UR FAX NUMBER   ADMISSION DENIALS (Administrative/Medical Necessity) [phone]   DISCHARGE SUPPORT TEAM (NETWORK) [phone]   PARENT CHILD HEALTH (Maternity/NICU/Pediatrics) [phone]   [facility] [phone]   [facility] [phone]   [facility] [phone]   [facility] [phone]   [facility] [phone]   [facility] [phone]   General acute hospital [phone]   [facility] [phone]   [facility]  HEART CAMPUS [phone]   [facility] [phone]   [facility] [phone]   [facility] [phone]             [1]   Past Medical History:  Diagnosis Date    Carotid artery stenosis     Cyst of breast     Dyspareunia in female     Hyperlipidemia     Hypertension     Personal history of endometriosis     RESOLVED: 12/14/16    Pre-diabetes     LAST ASSESSED: 12/14/16

## 2025-05-26 NOTE — PROGRESS NOTES
Progress Note - Cardiology   Name: Hyun Anthony 69 y.o. female I MRN: [id number]  Unit/Bed#: E4 -01 I Date of Admission: 5/24/2025   Date of Service: 5/26/2025 I Hospital Day: 2    Assessment & Plan  New onset a-fib (HCC)  Now back in sinus rhythm  Continue IV heparin, transition to DOAC after cardiac catheterization  Will review echocardiogram      Chest x-ray: Mild pulmonary edema, evidence of cardiomegaly  EKG revealed atrial fibrillation with heart rates in the 150s  BMP: 298 -examines euvolemic, will continue to monitor  TSH 1.5  Troponin: 19<177  -suspect non-MI related troponin elevation in the setting of tachycardia    [id number] score of 3   Anticoagulated with heparin in the emergency department, would transition to DOAC when appropriate.    Essential hypertension  Treated outpatient with lisinopril and hydrochlorothiazide -currently on hold with initiation of metoprolol and diltiazem drip  Hyperlipidemia  Takes rosuvastatin 10 mg daily -last lipid profile September 2024  Tobacco use  1 pack/day 20+ year smoker -encouraged cessation  Chest pain  Patient with chest discomfort and diaphoresis prior to presentation with elevated high-sensitivity troponin.  Significant risk factors including ongoing tobacco use and family history of CAD.  Patient agreeable for coronary angiography which will be completed tomorrow.  Start direct oral anticoagulant after coronary angiography when safe from bleeding standpoint.  No further Lasix    Subjective   Chief Complaint: Patient seen and examined, feels well, offers no complaints      Objective :  Temp:  [97.4 °F (36.3 °C)-98.7 °F (37.1 °C)] 97.4 °F (36.3 °C)  HR:  [61-84] 64  BP: (129-184)/() 155/81  Resp:  [16-18] 18  SpO2:  [95 %-98 %] 96 %  O2 Device: None (Room air)  Orthostatic Blood Pressures      Flowsheet Row Most Recent Value   Blood Pressure 155/81 filed at 05/26/2025 0753   Patient Position - Orthostatic VS Lying filed at 05/26/2025 0756           First Weight: Weight - Scale: 60.6 kg (133 lb 9.6 oz) (05/24/25 1022)  Vitals:    05/24/25 1022   Weight: 60.6 kg (133 lb 9.6 oz)     Physical Exam  Vitals reviewed.   Constitutional:       Appearance: She is well-developed.   HENT:      Head: Normocephalic and atraumatic.     Cardiovascular:      Rate and Rhythm: Normal rate and regular rhythm.      Heart sounds: Normal heart sounds.   Pulmonary:      Effort: Pulmonary effort is normal.      Breath sounds: Normal breath sounds.   Abdominal:      Palpations: Abdomen is soft.     Musculoskeletal:         General: Normal range of motion.      Cervical back: Normal range of motion and neck supple.     Skin:     General: Skin is warm and dry.     Neurological:      Mental Status: She is alert and oriented to person, place, and time.     Psychiatric:         Behavior: Behavior normal.         Thought Content: Thought content normal.         Judgment: Judgment normal.           Lab Results: I have reviewed the following results:CBC/BMP:   .     05/26/25  0503   WBC 5.56   HGB 11.6   HCT 35.3      SODIUM 139   K 3.7   *   CO2 25   BUN 16   CREATININE 0.61   GLUC 90    , Creatinine Clearance: Estimated Creatinine Clearance: 76.7 mL/min (by C-G formula based on SCr of 0.61 mg/dL).  Results from last 7 days   Lab Units 05/26/25  0503 05/25/25  0854 05/24/25  1026   WBC Thousand/uL 5.56 8.37 6.01   HEMOGLOBIN g/dL 11.6 12.4 13.4   HEMATOCRIT % 35.3 37.1 40.6   PLATELETS Thousands/uL 184 194 220     Results from last 7 days   Lab Units 05/26/25  0503 05/25/25  0854 05/24/25  1026   POTASSIUM mmol/L 3.7 4.0 4.3   CHLORIDE mmol/L 109* 111* 112*   CO2 mmol/L 25 24 25   BUN mg/dL 16 14 22   CREATININE mg/dL 0.61 0.63 0.82   CALCIUM mg/dL 8.9 8.8 9.2     Results from last 7 days   Lab Units 05/25/25  0854 05/25/25  0046 05/24/25  1719 05/24/25  1026   INR   --   --   --  1.01   PTT seconds 86* 46* 80* 29     Lab Results   Component Value Date    HGBA1C 5.6  05/25/2025     Lab Results   Component Value Date    CKTOTAL 94 09/01/2022

## 2025-05-26 NOTE — PLAN OF CARE
Problem: PAIN - ADULT  Goal: Verbalizes/displays adequate comfort level or baseline comfort level  Description: Interventions:  - Encourage patient to monitor pain and request assistance  - Assess pain using appropriate pain scale  - Administer analgesics as ordered based on type and severity of pain and evaluate response  - Implement non-pharmacological measures as appropriate and evaluate response  - Consider cultural and social influences on pain and pain management  - Notify physician/advanced practitioner if interventions unsuccessful or patient reports new pain  - Educate patient/family on pain management process including their role and importance of  reporting pain   - Provide non-pharmacologic/complimentary pain relief interventions  Outcome: Progressing     Problem: INFECTION - ADULT  Goal: Absence or prevention of progression during hospitalization  Description: INTERVENTIONS:  - Assess and monitor for signs and symptoms of infection  - Monitor lab/diagnostic results  - Monitor all insertion sites, i.e. indwelling lines, tubes, and drains  - Monitor endotracheal if appropriate and nasal secretions for changes in amount and color  - [place] appropriate cooling/warming therapies per order  - Administer medications as ordered  - Instruct and encourage patient and family to use good hand hygiene technique  - Identify and instruct in appropriate isolation precautions for identified infection/condition  Outcome: Progressing  Goal: Absence of fever/infection during neutropenic period  Description: INTERVENTIONS:  - Monitor WBC  - Perform strict hand hygiene  - Limit to healthy visitors only  - No plants, dried, fresh or silk flowers with tavarez in patient room  Outcome: Progressing     Problem: SAFETY ADULT  Goal: Patient will remain free of falls  Description: INTERVENTIONS:  - Educate patient/family on patient safety including physical limitations  - Instruct patient to call for assistance with activity   -  Consider consulting OT/PT to assist with strengthening/mobility based on AM PAC & JH-HLM score  - Consult OT/PT to assist with strengthening/mobility   - Keep Call bell within reach  - Keep bed low and locked with side rails adjusted as appropriate  - Keep care items and personal belongings within reach  - Initiate and maintain comfort rounds    - Consider moving patient to room near nurses station  Outcome: Progressing  Goal: Maintain or return to baseline ADL function  Description: INTERVENTIONS:  -  Assess patient's ability to carry out ADLs; assess patient's baseline for ADL function and identify physical deficits which impact ability to perform ADLs (bathing, care of mouth/teeth, toileting, grooming, dressing, etc.)  - Assess/evaluate cause of self-care deficits   - Assess range of motion  - Assess patient's mobility; develop plan if impaired  - Assess patient's need for assistive devices and provide as appropriate  - Encourage maximum independence but intervene and supervise when necessary  - Involve family in performance of ADLs  - Assess for home care needs following discharge   - Consider OT consult to assist with ADL evaluation and planning for discharge  - Provide patient education as appropriate  - Monitor functional capacity and physical performance, use of AM PAC & JH-HLM   - Monitor gait, balance and fatigue with ambulation    Outcome: Progressing  Goal: Maintains/Returns to pre admission functional level  Description: INTERVENTIONS:  - Perform AM-PAC 6 Click Basic Mobility/ Daily Activity assessment daily.  - Set and communicate daily mobility goal to care team and patient/family/caregiver.   - Collaborate with rehabilitation services on mobility goals if consulted  - Ambulate patient 3 times a day  - Out of bed to chair 3 times a day   - Out of bed for meals 3 times a day  - Out of bed for toileting  - Record patient progress and toleration of activity level   Outcome: Progressing     Problem:  DISCHARGE PLANNING  Goal: Discharge to home or other facility with appropriate resources  Description: INTERVENTIONS:  - Identify barriers to discharge w/patient and caregiver  - Arrange for needed discharge resources and transportation as appropriate  - Identify discharge learning needs (meds, wound care, etc.)  - Arrange for interpretive services to assist at discharge as needed  - Refer to Case Management Department for coordinating discharge planning if the patient needs post-hospital services based on physician/advanced practitioner order or complex needs related to functional status, cognitive ability, or social support system  Outcome: Progressing     Problem: Knowledge Deficit  Goal: Patient/family/caregiver demonstrates understanding of disease process, treatment plan, medications, and discharge instructions  Description: Complete learning assessment and assess knowledge base.  Interventions:  - Provide teaching at level of understanding  - Provide teaching via preferred learning methods  Outcome: Progressing

## 2025-05-26 NOTE — ASSESSMENT & PLAN NOTE
Now back in sinus rhythm  Continue IV heparin, transition to DOAC after cardiac catheterization  Will review echocardiogram      Chest x-ray: Mild pulmonary edema, evidence of cardiomegaly  EKG revealed atrial fibrillation with heart rates in the 150s  BMP: 298 -examines euvolemic, will continue to monitor  TSH 1.5  Troponin: 19<177  -suspect non-MI related troponin elevation in the setting of tachycardia    [id number] score of 3   Anticoagulated with heparin in the emergency department, would transition to DOAC when appropriate.

## 2025-05-26 NOTE — ASSESSMENT & PLAN NOTE
Patient with chest discomfort and diaphoresis prior to presentation with elevated high-sensitivity troponin.  Significant risk factors including ongoing tobacco use and family history of CAD.  Patient agreeable for coronary angiography which will be completed tomorrow.  Start direct oral anticoagulant after coronary angiography when safe from bleeding standpoint.  No further Lasix

## 2025-05-26 NOTE — ASSESSMENT & PLAN NOTE
Holding PTA lisinopril and HCTZ  Was hypotensive and started on fluids, with mild rales on exam and pulmonary venous congestion.  Fluids stopped  BP elevated 5/25, increased Lopressor for better BP control from 25 mg twice daily to 37.5 mg twice daily with improvement  Would continue to hold lisinopril and HCTZ due to anticipated contrast on Tuesday with cardiac catheterization  Consider addition of amlodipine if BP remains elevated  Received Lasix per cardiology 5/25

## 2025-05-26 NOTE — PROGRESS NOTES
Progress Note - Hospitalist   Name: Hyun Anthony 69 y.o. female I MRN: [id number]  Unit/Bed#: E4 -01 I Date of Admission: 5/24/2025   Date of Service: 5/26/2025 I Hospital Day: 2    Assessment & Plan  New onset a-fib (HCC)  Presented with fatigue and noted to be in atrial fibrillation with RVR  No known history of atrial fibrillation, KAREN, no recent illness, no alcohol use  Placed on Cardizem drip and Lopressor 25 mg twice daily   Increasing dose to 37.5 mg twice daily for better BP control.  Patient has returned to sinus rhythm  TSH within normal limits  BNP is elevated to 298 and chest x-ray with congestion,s/p IV Lasix x1 with improvement in peripheral edema per pt  Follow-up echocardiogram, no recent  [id number]= 3, started on Eliquis which was price checked (currently on hold and on hep gtt pdg cath then to resume post cath)  Continue telemetry monitoring  Chest pain  Patient reported symptoms of severe chest pressure and diaphoresis prior to presentation to Dr. Watson  Troponin last peaked at 387 down to 289 this AM, EKG with nonspecific ST and T wave abnormalities at peak trop level with no EKG previously  Due to symptoms and above findings with risk factors including ongoing tobacco abuse and family history of CAD cardiology planning for cardiac catheterization Tuesday 5/27  Lipid panel: LDL 97, HDL 80, triglycerides 62, cholesterol 189  A1c 5.6  Discussed with Dr. Watson 5/25: Eliquis held AM of 5/26 and started back on hep gtt in anticipation of cath, then should resume OAC post cath  Held DAPT load   Currently chest pain free  Continue beta-blocker with Lopressor, high-dose atorvastatin  Essential hypertension  Holding PTA lisinopril and HCTZ  Was hypotensive and started on fluids, with mild rales on exam and pulmonary venous congestion.  Fluids stopped  BP elevated 5/25, increased Lopressor for better BP control from 25 mg twice daily to 37.5 mg twice daily with improvement  Would continue to  hold lisinopril and HCTZ due to anticipated contrast on Tuesday with cardiac catheterization  Consider addition of amlodipine if BP remains elevated  Received Lasix per cardiology 5/25  Hyperlipidemia  Patient was on rosuvastatin 10 mg at home, which was substituted with pravastatin 80 mg daily  Change over to high-dose atorvastatin 80 mg today  Tobacco use  Continue nicotine replacement therapy, encourage smoking cessation    VTE Pharmacologic Prophylaxis: VTE Score: 2 IV hep gtt    Mobility:   Basic Mobility Inpatient Raw Score: 24  JH-HLM Goal: 8: Walk 250 feet or more  JH-HLM Achieved: 8: Walk 250 feet ot more  JH-HLM Goal achieved. Continue to encourage appropriate mobility.    Patient Centered Rounds: I performed bedside rounds with nursing staff today.   Discussions with Specialists or Other Care Team Provider: reviewed Cardiology recommendations    Education and Discussions with Family / Patient: Updated  (daughter) at bedside.    Current Length of Stay: 2 day(s)  Current Patient Status: Inpatient   Certification Statement: The patient will continue to require additional inpatient hospital stay due to pending cardiac cath  Discharge Plan: Anticipate discharge in 24-48 hrs to home.    Code Status: Level 1 - Full Code    Subjective   Patient reports full resolution of all symptoms today.  She had experienced some fatigue yesterday which has now resolved as well.  She reports her main symptom was chest pressure on admission which she has not experienced since receiving medications in the ED.  She denies lightheadedness, headache, dyspnea, palpitations.     Objective :  Temp:  [97.4 °F (36.3 °C)-98.6 °F (37 °C)] 97.6 °F (36.4 °C)  HR:  [61-84] 70  BP: (129-184)/(67-90) 155/87  Resp:  [16-18] 18  SpO2:  [95 %-97 %] 97 %  O2 Device: None (Room air)    Body mass index is 24.44 kg/m².     Input and Output Summary (last 24 hours):     Intake/Output Summary (Last 24 hours) at 5/26/2025 1434  Last data  filed at 5/26/2025 0900  Gross per 24 hour   Intake 360 ml   Output --   Net 360 ml       Physical Exam  Vitals and nursing note reviewed.   Constitutional:       General: She is not in acute distress.     Appearance: Normal appearance. She is normal weight. She is not ill-appearing or toxic-appearing.   HENT:      Head: Normocephalic and atraumatic.      Right Ear: External ear normal.      Left Ear: External ear normal.      Nose: Nose normal.      Mouth/Throat:      Mouth: Mucous membranes are moist.      Pharynx: Oropharynx is clear.     Eyes:      Conjunctiva/sclera: Conjunctivae normal.       Cardiovascular:      Rate and Rhythm: Normal rate and regular rhythm.      Pulses: Normal pulses.      Heart sounds: Normal heart sounds. No murmur heard.     No gallop.   Pulmonary:      Effort: Pulmonary effort is normal. No respiratory distress.      Breath sounds: Normal breath sounds. No stridor. No wheezing, rhonchi or rales.   Abdominal:      General: There is no distension.      Palpations: Abdomen is soft. There is no mass.      Tenderness: There is no abdominal tenderness. There is no guarding.      Hernia: No hernia is present.     Musculoskeletal:      Cervical back: Normal range of motion.      Right lower leg: No edema.      Left lower leg: No edema.     Skin:     General: Skin is warm and dry.     Neurological:      Mental Status: She is alert. Mental status is at baseline.     Psychiatric:         Mood and Affect: Mood normal.           Lines/Drains:              Lab Results: I have reviewed the following results:   Results from last 7 days   Lab Units 05/26/25  0503 05/25/25  0854 05/24/25  1026   WBC Thousand/uL 5.56   < > 6.01   HEMOGLOBIN g/dL 11.6   < > 13.4   HEMATOCRIT % 35.3   < > 40.6   PLATELETS Thousands/uL 184   < > 220   SEGS PCT %  --   --  47   LYMPHO PCT %  --   --  42   MONO PCT %  --   --  7   EOS PCT %  --   --  3    < > = values in this interval not displayed.     Results from last 7  days   Lab Units 05/26/25  0503 05/25/25  0854   SODIUM mmol/L 139 139   POTASSIUM mmol/L 3.7 4.0   CHLORIDE mmol/L 109* 111*   CO2 mmol/L 25 24   BUN mg/dL 16 14   CREATININE mg/dL 0.61 0.63   ANION GAP mmol/L 5 4   CALCIUM mg/dL 8.9 8.8   ALBUMIN g/dL  --  3.9   TOTAL BILIRUBIN mg/dL  --  0.60   ALK PHOS U/L  --  69   ALT U/L  --  35   AST U/L  --  36   GLUCOSE RANDOM mg/dL 90 109     Results from last 7 days   Lab Units 05/24/25  1026   INR  1.01         Results from last 7 days   Lab Units 05/25/25  0854   HEMOGLOBIN A1C % 5.6           Recent Cultures (last 7 days):         Imaging Results Review: I reviewed radiology reports from this admission including: chest xray.  Other Study Results Review: EKG was reviewed.     Last 24 Hours Medication List:     Current Facility-Administered Medications:     aluminum-magnesium hydroxide-simethicone (MAALOX) oral suspension 30 mL, Q6H PRN    atorvastatin (LIPITOR) tablet 80 mg, Daily With Dinner    heparin (porcine) 25,000 units in 0.45% NaCl 250 mL infusion (premix), Titrated    heparin (porcine) injection 1,800 Units, Q6H PRN    heparin (porcine) injection 3,600 Units, Once    heparin (porcine) injection 3,600 Units, Q6H PRN    metoprolol tartrate (LOPRESSOR) partial tablet 37.5 mg, Q12H ROMELIA    nicotine (NICODERM CQ) 21 mg/24 hr TD 24 hr patch 1 patch, Daily    ondansetron (ZOFRAN) injection 4 mg, Q6H PRN    polyethylene glycol (MIRALAX) packet 17 g, Daily PRN    Administrative Statements   Today, Patient Was Seen By: Oralia Mack PA-C  I have spent a total time of 45 minutes in caring for this patient on the day of the visit/encounter including Diagnostic results, Prognosis, Impressions, Counseling / Coordination of care, Documenting in the medical record, Reviewing/placing orders in the medical record (including tests, medications, and/or procedures), Obtaining or reviewing history  , and Communicating with other healthcare professionals .    **Please Note: This  note may have been constructed using a voice recognition system.**

## 2025-05-26 NOTE — ASSESSMENT & PLAN NOTE
Patient was on rosuvastatin 10 mg at home, which was substituted with pravastatin 80 mg daily  Change over to high-dose atorvastatin 80 mg today

## 2025-05-26 NOTE — PLAN OF CARE
Problem: PAIN - ADULT  Goal: Verbalizes/displays adequate comfort level or baseline comfort level  Description: Interventions:  - Encourage patient to monitor pain and request assistance  - Assess pain using appropriate pain scale  - Administer analgesics as ordered based on type and severity of pain and evaluate response  - Implement non-pharmacological measures as appropriate and evaluate response  - Consider cultural and social influences on pain and pain management  - Notify physician/advanced practitioner if interventions unsuccessful or patient reports new pain  - Educate patient/family on pain management process including their role and importance of  reporting pain   - Provide non-pharmacologic/complimentary pain relief interventions  Outcome: Progressing     Problem: INFECTION - ADULT  Goal: Absence or prevention of progression during hospitalization  Description: INTERVENTIONS:  - Assess and monitor for signs and symptoms of infection  - Monitor lab/diagnostic results  - Monitor all insertion sites, i.e. indwelling lines, tubes, and drains  - Monitor endotracheal if appropriate and nasal secretions for changes in amount and color  - [place] appropriate cooling/warming therapies per order  - Administer medications as ordered  - Instruct and encourage patient and family to use good hand hygiene technique  - Identify and instruct in appropriate isolation precautions for identified infection/condition  Outcome: Progressing  Goal: Absence of fever/infection during neutropenic period  Description: INTERVENTIONS:  - Monitor WBC  - Perform strict hand hygiene  - Limit to healthy visitors only  - No plants, dried, fresh or silk flowers with tavarez in patient room  Outcome: Progressing     Problem: SAFETY ADULT  Goal: Patient will remain free of falls  Description: INTERVENTIONS:  - Educate patient/family on patient safety including physical limitations  - Instruct patient to call for assistance with activity   -  Consider consulting OT/PT to assist with strengthening/mobility based on AM PAC & JH-HLM score  - Consult OT/PT to assist with strengthening/mobility   - Keep Call bell within reach  - Keep bed low and locked with side rails adjusted as appropriate  - Keep care items and personal belongings within reach  - Initiate and maintain comfort rounds    - Consider moving patient to room near nurses station  Outcome: Progressing  Goal: Maintain or return to baseline ADL function  Description: INTERVENTIONS:  -  Assess patient's ability to carry out ADLs; assess patient's baseline for ADL function and identify physical deficits which impact ability to perform ADLs (bathing, care of mouth/teeth, toileting, grooming, dressing, etc.)  - Assess/evaluate cause of self-care deficits   - Assess range of motion  - Assess patient's mobility; develop plan if impaired  - Assess patient's need for assistive devices and provide as appropriate  - Encourage maximum independence but intervene and supervise when necessary  - Involve family in performance of ADLs  - Assess for home care needs following discharge   - Consider OT consult to assist with ADL evaluation and planning for discharge  - Provide patient education as appropriate  - Monitor functional capacity and physical performance, use of AM PAC & JH-HLM   - Monitor gait, balance and fatigue with ambulation    Outcome: Progressing  Goal: Maintains/Returns to pre admission functional level  Description: INTERVENTIONS:  - Perform AM-PAC 6 Click Basic Mobility/ Daily Activity assessment daily.  - Set and communicate daily mobility goal to care team and patient/family/caregiver.   - Collaborate with rehabilitation services on mobility goals if consulted  - Ambulate patient 3 times a day  - Out of bed to chair 3 times a day   - Out of bed for meals 3 times a day  - Out of bed for toileting  - Record patient progress and toleration of activity level   Outcome: Progressing     Problem:  DISCHARGE PLANNING  Goal: Discharge to home or other facility with appropriate resources  Description: INTERVENTIONS:  - Identify barriers to discharge w/patient and caregiver  - Arrange for needed discharge resources and transportation as appropriate  - Identify discharge learning needs (meds, wound care, etc.)  - Arrange for interpretive services to assist at discharge as needed  - Refer to Case Management Department for coordinating discharge planning if the patient needs post-hospital services based on physician/advanced practitioner order or complex needs related to functional status, cognitive ability, or social support system  Outcome: Progressing     Problem: Knowledge Deficit  Goal: Patient/family/caregiver demonstrates understanding of disease process, treatment plan, medications, and discharge instructions  Description: Complete learning assessment and assess knowledge base.  Interventions:  - Provide teaching at level of understanding  - Provide teaching via preferred learning methods  Outcome: Progressing

## 2025-05-27 LAB
ANION GAP SERPL CALCULATED.3IONS-SCNC: 6 MMOL/L (ref 4–13)
AORTIC ROOT: 3.3 CM
APTT PPP: 58 SECONDS (ref 23–34)
APTT PPP: 74 SECONDS (ref 23–34)
ASCENDING AORTA: 3.8 CM
BSA FOR ECHO PROCEDURE: 1.61 M2
BUN SERPL-MCNC: 16 MG/DL (ref 5–25)
CALCIUM SERPL-MCNC: 8.8 MG/DL (ref 8.4–10.2)
CHLORIDE SERPL-SCNC: 110 MMOL/L (ref 96–108)
CO2 SERPL-SCNC: 22 MMOL/L (ref 21–32)
CREAT SERPL-MCNC: 0.56 MG/DL (ref 0.6–1.3)
DOP CALC LVOT AREA: 3.14 CM2
DOP CALC LVOT DIAMETER: 2 CM
E WAVE DECELERATION TIME: 242 MS
E/A RATIO: 0.83
ERYTHROCYTE [DISTWIDTH] IN BLOOD BY AUTOMATED COUNT: 13 % (ref 11.6–15.1)
GFR SERPL CREATININE-BSD FRML MDRD: 96 ML/MIN/1.73SQ M
GLUCOSE SERPL-MCNC: 105 MG/DL (ref 65–140)
HCT VFR BLD AUTO: 34.1 % (ref 34.8–46.1)
HGB BLD-MCNC: 11.5 G/DL (ref 11.5–15.4)
LAAS-AP2: 20.2 CM2
LAAS-AP4: 16.9 CM2
LEFT ATRIUM SIZE: 3.4 CM
LV EF US.2D.A4C+ESTIMATED: 76 %
MCH RBC QN AUTO: 32.6 PG (ref 26.8–34.3)
MCHC RBC AUTO-ENTMCNC: 33.7 G/DL (ref 31.4–37.4)
MCV RBC AUTO: 97 FL (ref 82–98)
MV E'TISSUE VEL-LAT: 5 CM/S
MV E'TISSUE VEL-SEP: 5 CM/S
MV PEAK A VEL: 1.15 M/S
MV PEAK E VEL: 96 CM/S
MV STENOSIS PRESSURE HALF TIME: 70 MS
MV VALVE AREA P 1/2 METHOD: 3.14
PLATELET # BLD AUTO: 168 THOUSANDS/UL (ref 149–390)
PMV BLD AUTO: 10.5 FL (ref 8.9–12.7)
POTASSIUM SERPL-SCNC: 3.8 MMOL/L (ref 3.5–5.3)
RBC # BLD AUTO: 3.53 MILLION/UL (ref 3.81–5.12)
RIGHT ATRIAL 2D VOLUME: 34 ML
RIGHT ATRIUM AREA SYSTOLE A4C: 14 CM2
RIGHT VENTRICLE ID DIMENSION: 3.6 CM
SL CV ECHO LV DYNAMIC OBSTRUCTION PEAK GRADIENT (REST): 35 MMHG
SL CV LEFT ATRIUM LENGTH A2C: 5.7 CM
SL CV LV EF: 75
SODIUM SERPL-SCNC: 138 MMOL/L (ref 135–147)
TRICUSPID ANNULAR PLANE SYSTOLIC EXCURSION: 1.6 CM
WBC # BLD AUTO: 6.05 THOUSAND/UL (ref 4.31–10.16)

## 2025-05-27 PROCEDURE — C1769 GUIDE WIRE: HCPCS | Performed by: INTERNAL MEDICINE

## 2025-05-27 PROCEDURE — 4A023N7 MEASUREMENT OF CARDIAC SAMPLING AND PRESSURE, LEFT HEART, PERCUTANEOUS APPROACH: ICD-10-PCS | Performed by: INTERNAL MEDICINE

## 2025-05-27 PROCEDURE — 99152 MOD SED SAME PHYS/QHP 5/>YRS: CPT | Performed by: INTERNAL MEDICINE

## 2025-05-27 PROCEDURE — 85027 COMPLETE CBC AUTOMATED: CPT | Performed by: INTERNAL MEDICINE

## 2025-05-27 PROCEDURE — C1887 CATHETER, GUIDING: HCPCS | Performed by: INTERNAL MEDICINE

## 2025-05-27 PROCEDURE — 93458 L HRT ARTERY/VENTRICLE ANGIO: CPT | Performed by: INTERNAL MEDICINE

## 2025-05-27 PROCEDURE — C1760 CLOSURE DEV, VASC: HCPCS | Performed by: INTERNAL MEDICINE

## 2025-05-27 PROCEDURE — 85730 THROMBOPLASTIN TIME PARTIAL: CPT | Performed by: INTERNAL MEDICINE

## 2025-05-27 PROCEDURE — 80048 BASIC METABOLIC PNL TOTAL CA: CPT | Performed by: INTERNAL MEDICINE

## 2025-05-27 PROCEDURE — 99232 SBSQ HOSP IP/OBS MODERATE 35: CPT

## 2025-05-27 PROCEDURE — C1894 INTRO/SHEATH, NON-LASER: HCPCS | Performed by: INTERNAL MEDICINE

## 2025-05-27 PROCEDURE — 99153 MOD SED SAME PHYS/QHP EA: CPT | Performed by: INTERNAL MEDICINE

## 2025-05-27 DEVICE — PERCLOSE™ PROSTYLE™ SUTURE-MEDIATED CLOSURE AND REPAIR SYSTEM
Type: IMPLANTABLE DEVICE | Status: FUNCTIONAL
Brand: PERCLOSE™ PROSTYLE™

## 2025-05-27 RX ORDER — VERAPAMIL HYDROCHLORIDE 2.5 MG/ML
INJECTION INTRAVENOUS CODE/TRAUMA/SEDATION MEDICATION
Status: DISCONTINUED | OUTPATIENT
Start: 2025-05-27 | End: 2025-05-27 | Stop reason: HOSPADM

## 2025-05-27 RX ORDER — HEPARIN SODIUM 1000 [USP'U]/ML
INJECTION, SOLUTION INTRAVENOUS; SUBCUTANEOUS CODE/TRAUMA/SEDATION MEDICATION
Status: DISCONTINUED | OUTPATIENT
Start: 2025-05-27 | End: 2025-05-27 | Stop reason: HOSPADM

## 2025-05-27 RX ORDER — NIFEDIPINE 10 MG/1
10 CAPSULE ORAL 2 TIMES DAILY
Status: DISCONTINUED | OUTPATIENT
Start: 2025-05-27 | End: 2025-05-28 | Stop reason: HOSPADM

## 2025-05-27 RX ORDER — ONDANSETRON 2 MG/ML
INJECTION INTRAMUSCULAR; INTRAVENOUS CODE/TRAUMA/SEDATION MEDICATION
Status: DISCONTINUED | OUTPATIENT
Start: 2025-05-27 | End: 2025-05-27 | Stop reason: HOSPADM

## 2025-05-27 RX ORDER — MIDAZOLAM HYDROCHLORIDE 2 MG/2ML
INJECTION, SOLUTION INTRAMUSCULAR; INTRAVENOUS CODE/TRAUMA/SEDATION MEDICATION
Status: DISCONTINUED | OUTPATIENT
Start: 2025-05-27 | End: 2025-05-27 | Stop reason: HOSPADM

## 2025-05-27 RX ORDER — METOPROLOL TARTRATE 25 MG/1
25 TABLET, FILM COATED ORAL EVERY 12 HOURS SCHEDULED
Status: DISCONTINUED | OUTPATIENT
Start: 2025-05-27 | End: 2025-05-28 | Stop reason: HOSPADM

## 2025-05-27 RX ORDER — LIDOCAINE HYDROCHLORIDE 10 MG/ML
INJECTION, SOLUTION EPIDURAL; INFILTRATION; INTRACAUDAL; PERINEURAL CODE/TRAUMA/SEDATION MEDICATION
Status: DISCONTINUED | OUTPATIENT
Start: 2025-05-27 | End: 2025-05-27 | Stop reason: HOSPADM

## 2025-05-27 RX ORDER — NITROGLYCERIN 20 MG/100ML
INJECTION INTRAVENOUS CODE/TRAUMA/SEDATION MEDICATION
Status: DISCONTINUED | OUTPATIENT
Start: 2025-05-27 | End: 2025-05-27 | Stop reason: HOSPADM

## 2025-05-27 RX ORDER — FENTANYL CITRATE 50 UG/ML
INJECTION, SOLUTION INTRAMUSCULAR; INTRAVENOUS CODE/TRAUMA/SEDATION MEDICATION
Status: DISCONTINUED | OUTPATIENT
Start: 2025-05-27 | End: 2025-05-27 | Stop reason: HOSPADM

## 2025-05-27 RX ORDER — SODIUM CHLORIDE 9 MG/ML
75 INJECTION, SOLUTION INTRAVENOUS CONTINUOUS
Status: DISPENSED | OUTPATIENT
Start: 2025-05-27 | End: 2025-05-27

## 2025-05-27 RX ADMIN — HEPARIN SODIUM 14 UNITS/KG/HR: 10000 INJECTION, SOLUTION INTRAVENOUS at 08:50

## 2025-05-27 RX ADMIN — SODIUM CHLORIDE 75 ML/HR: 0.9 INJECTION, SOLUTION INTRAVENOUS at 14:56

## 2025-05-27 RX ADMIN — METOPROLOL TARTRATE 37.5 MG: 25 TABLET, FILM COATED ORAL at 08:48

## 2025-05-27 RX ADMIN — HEPARIN SODIUM 1800 UNITS: 1000 INJECTION, SOLUTION INTRAVENOUS; SUBCUTANEOUS at 10:25

## 2025-05-27 RX ADMIN — ATORVASTATIN CALCIUM 80 MG: 80 TABLET, FILM COATED ORAL at 16:57

## 2025-05-27 RX ADMIN — NIFEDIPINE 10 MG: 10 CAPSULE ORAL at 20:16

## 2025-05-27 RX ADMIN — NICOTINE 1 PATCH: 21 PATCH, EXTENDED RELEASE TRANSDERMAL at 08:48

## 2025-05-27 RX ADMIN — APIXABAN 5 MG: 5 TABLET, FILM COATED ORAL at 20:15

## 2025-05-27 RX ADMIN — METOPROLOL TARTRATE 25 MG: 25 TABLET, FILM COATED ORAL at 20:15

## 2025-05-27 RX ADMIN — NIFEDIPINE 10 MG: 10 CAPSULE ORAL at 11:23

## 2025-05-27 NOTE — PLAN OF CARE
Problem: PAIN - ADULT  Goal: Verbalizes/displays adequate comfort level or baseline comfort level  Description: Interventions:  - Encourage patient to monitor pain and request assistance  - Assess pain using appropriate pain scale  - Administer analgesics as ordered based on type and severity of pain and evaluate response  - Implement non-pharmacological measures as appropriate and evaluate response  - Consider cultural and social influences on pain and pain management  - Notify physician/advanced practitioner if interventions unsuccessful or patient reports new pain  - Educate patient/family on pain management process including their role and importance of  reporting pain   - Provide non-pharmacologic/complimentary pain relief interventions  Outcome: Progressing     Problem: INFECTION - ADULT  Goal: Absence or prevention of progression during hospitalization  Description: INTERVENTIONS:  - Assess and monitor for signs and symptoms of infection  - Monitor lab/diagnostic results  - Monitor all insertion sites, i.e. indwelling lines, tubes, and drains  - Monitor endotracheal if appropriate and nasal secretions for changes in amount and color  - [place] appropriate cooling/warming therapies per order  - Administer medications as ordered  - Instruct and encourage patient and family to use good hand hygiene technique  - Identify and instruct in appropriate isolation precautions for identified infection/condition  Outcome: Progressing     Problem: SAFETY ADULT  Goal: Patient will remain free of falls  Description: INTERVENTIONS:  - Educate patient/family on patient safety including physical limitations  - Instruct patient to call for assistance with activity   - Consider consulting OT/PT to assist with strengthening/mobility based on AM PAC & JH-HLM score  - Consult OT/PT to assist with strengthening/mobility   - Keep Call bell within reach  - Keep bed low and locked with side rails adjusted as appropriate  - Keep  care items and personal belongings within reach  - Initiate and maintain comfort rounds    - Consider moving patient to room near nurses station  Outcome: Progressing  Goal: Maintain or return to baseline ADL function  Description: INTERVENTIONS:  -  Assess patient's ability to carry out ADLs; assess patient's baseline for ADL function and identify physical deficits which impact ability to perform ADLs (bathing, care of mouth/teeth, toileting, grooming, dressing, etc.)  - Assess/evaluate cause of self-care deficits   - Assess range of motion  - Assess patient's mobility; develop plan if impaired  - Assess patient's need for assistive devices and provide as appropriate  - Encourage maximum independence but intervene and supervise when necessary  - Involve family in performance of ADLs  - Assess for home care needs following discharge   - Consider OT consult to assist with ADL evaluation and planning for discharge  - Provide patient education as appropriate  - Monitor functional capacity and physical performance, use of AM PAC & JH-HLM   - Monitor gait, balance and fatigue with ambulation    Outcome: Progressing  Goal: Maintains/Returns to pre admission functional level  Description: INTERVENTIONS:  - Perform AM-PAC 6 Click Basic Mobility/ Daily Activity assessment daily.  - Set and communicate daily mobility goal to care team and patient/family/caregiver.   - Collaborate with rehabilitation services on mobility goals if consulted  - Ambulate patient 3 times a day  - Out of bed to chair 3 times a day   - Out of bed for meals 3 times a day  - Out of bed for toileting  - Record patient progress and toleration of activity level   Outcome: Progressing     Problem: DISCHARGE PLANNING  Goal: Discharge to home or other facility with appropriate resources  Description: INTERVENTIONS:  - Identify barriers to discharge w/patient and caregiver  - Arrange for needed discharge resources and transportation as appropriate  -  Identify discharge learning needs (meds, wound care, etc.)  - Arrange for interpretive services to assist at discharge as needed  - Refer to Case Management Department for coordinating discharge planning if the patient needs post-hospital services based on physician/advanced practitioner order or complex needs related to functional status, cognitive ability, or social support system  Outcome: Progressing     Problem: Knowledge Deficit  Goal: Patient/family/caregiver demonstrates understanding of disease process, treatment plan, medications, and discharge instructions  Description: Complete learning assessment and assess knowledge base.  Interventions:  - Provide teaching at level of understanding  - Provide teaching via preferred learning methods  Outcome: Progressing     Problem: CARDIOVASCULAR - ADULT  Goal: Maintains optimal cardiac output and hemodynamic stability  Description: INTERVENTIONS:  - Monitor I/O, vital signs and rhythm  - Monitor for S/S and trends of decreased cardiac output  - Administer and titrate ordered vasoactive medications to optimize hemodynamic stability  - Assess quality of pulses, skin color and temperature  - Assess for signs of decreased coronary artery perfusion  - Instruct patient to report change in severity of symptoms  Outcome: Progressing  Goal: Absence of cardiac dysrhythmias or at baseline rhythm  Description: INTERVENTIONS:  - Continuous cardiac monitoring, vital signs, obtain 12 lead EKG if ordered  - Administer antiarrhythmic and heart rate control medications as ordered  - Monitor electrolytes and administer replacement therapy as ordered  Outcome: Progressing

## 2025-05-27 NOTE — PROGRESS NOTES
Progress Note - Hospitalist   Name: Hyun Anthony 69 y.o. female I MRN: [id number]  Unit/Bed#: E4 -01 I Date of Admission: 5/24/2025   Date of Service: 5/27/2025 I Hospital Day: 3    Assessment & Plan  New onset a-fib (HCC)  Presented with fatigue and noted to be in atrial fibrillation with RVR  No known history of atrial fibrillation, KAREN, no recent illness, no alcohol use  Weaned off Cardizem drip, continue Lopressor 37.5 mg twice daily  Continues to remain in normal sinus rhythm  TSH within normal limits  BNP is elevated to 298 and chest x-ray with congestion,s/p IV Lasix x1 with improvement in peripheral edema per pt  Pending formal echocardiogram read, no recent  [id number]= 3, started on Eliquis which was price checked (currently on hold and on hep gtt pending cath then to resume post cath)  Continue telemetry monitoring  Chest pain  Patient reported symptoms of severe chest pressure and diaphoresis prior to presentation to Dr. Watson  Troponin last peaked at 387, EKG with nonspecific ST and T wave abnormalities at peak trop level with no EKG previously  Due to symptoms and above findings with risk factors including ongoing tobacco abuse and family history of CAD cardiology planning for cardiac catheterization Tuesday 5/27  Lipid panel: LDL 97, HDL 80, triglycerides 62, cholesterol 189  A1c 5.6  Currently on heparin drip, beta-blocker statin  Currently holding DAPT load per Dr. Watson  Currently chest pain-free  Essential hypertension  Holding PTA lisinopril and HCTZ in anticipation of cardiac cath  Continues on Lopressor 37.5 mg twice daily  Of note patient does have history of myalgias with amlodipine  BP elevated in the 150s up to 180 yesterday evening  Discussed Dr. Watson.  Will trial low-dose nifedipine 10 mg twice daily.  Hyperlipidemia  Patient was on rosuvastatin 10 mg at home, on high-dose atorvastatin milligram here  Tobacco use  Continue nicotine replacement therapy, encourage smoking  cessation    VTE Pharmacologic Prophylaxis: heparin drip    Mobility:   Basic Mobility Inpatient Raw Score: 24  JH-HLM Goal: 8: Walk 250 feet or more  JH-HLM Achieved: 8: Walk 250 feet ot more  JH-HLM Goal achieved. Continue to encourage appropriate mobility.    Patient Centered Rounds: I performed bedside rounds with nursing staff today.   Discussions with Specialists or Other Care Team Provider: Dr. Watson    Education and Discussions with Family / Patient: Patient    Current Length of Stay: 3 day(s)  Current Patient Status: Inpatient   Certification Statement: The patient will continue to require additional inpatient hospital stay due to cardiac catheterization  Discharge Plan: Anticipate discharge later today or tomorrow to home.    Code Status: Level 1 - Full Code    Subjective   Patient seen and examined lying in bed.  Reports she feels well.  No acute events overnight.  Denies any chest pain.  She is ready for cardiac catheterization today.    She states her blood pressure was high last night today.  She said she is open to other blood pressure medications although did have a history of severe leg cramps with amlodipine.    Objective :  Temp:  [97.5 °F (36.4 °C)-98.4 °F (36.9 °C)] 97.8 °F (36.6 °C)  HR:  [59-81] 59  BP: (148-180)/(72-89) 155/78  Resp:  [18-20] 20  SpO2:  [96 %-98 %] 97 %  O2 Device: None (Room air)    Body mass index is 24.33 kg/m².     Input and Output Summary (last 24 hours):     Intake/Output Summary (Last 24 hours) at 5/27/2025 0918  Last data filed at 5/27/2025 0513  Gross per 24 hour   Intake 1487.06 ml   Output 900 ml   Net 587.06 ml       Physical Exam  Vitals and nursing note reviewed.   Constitutional:       General: She is not in acute distress.     Appearance: Normal appearance. She is well-developed. She is not ill-appearing or diaphoretic.     Cardiovascular:      Rate and Rhythm: Normal rate and regular rhythm.   Pulmonary:      Effort: Pulmonary effort is normal. No respiratory  distress.      Breath sounds: Normal breath sounds.   Abdominal:      General: Bowel sounds are normal.      Palpations: Abdomen is soft.      Tenderness: There is no abdominal tenderness.     Musculoskeletal:      Right lower leg: No edema.      Left lower leg: No edema.     Skin:     General: Skin is warm and dry.      Findings: No bruising.     Neurological:      Mental Status: She is alert and oriented to person, place, and time. Mental status is at baseline.     Psychiatric:         Mood and Affect: Mood normal.         Behavior: Behavior normal.       Telemetry:  Telemetry Orders (From admission, onward)               24 Hour Telemetry Monitoring  Continuous x 24 Hours (Telem)        Expiring   Question:  Reason for 24 Hour Telemetry  Answer:  Arrhythmias requiring acute medical intervention / PPM or ICD malfunction                     Telemetry Reviewed: Normal Sinus Rhythm  Indication for Continued Telemetry Use: Arrthymias requiring medical therapy        Lab Results: I have reviewed the following results:   Results from last 7 days   Lab Units 05/27/25  0305 05/25/25  0854 05/24/25  1026   WBC Thousand/uL 6.05   < > 6.01   HEMOGLOBIN g/dL 11.5   < > 13.4   HEMATOCRIT % 34.1*   < > 40.6   PLATELETS Thousands/uL 168   < > 220   SEGS PCT %  --   --  47   LYMPHO PCT %  --   --  42   MONO PCT %  --   --  7   EOS PCT %  --   --  3    < > = values in this interval not displayed.     Results from last 7 days   Lab Units 05/27/25  0305 05/26/25  0503 05/25/25  0854   SODIUM mmol/L 138   < > 139   POTASSIUM mmol/L 3.8   < > 4.0   CHLORIDE mmol/L 110*   < > 111*   CO2 mmol/L 22   < > 24   BUN mg/dL 16   < > 14   CREATININE mg/dL 0.56*   < > 0.63   ANION GAP mmol/L 6   < > 4   CALCIUM mg/dL 8.8   < > 8.8   ALBUMIN g/dL  --   --  3.9   TOTAL BILIRUBIN mg/dL  --   --  0.60   ALK PHOS U/L  --   --  69   ALT U/L  --   --  35   AST U/L  --   --  36   GLUCOSE RANDOM mg/dL 105   < > 109    < > = values in this interval not  displayed.     Results from last 7 days   Lab Units 05/26/25  1442   INR  1.00     Results from last 7 days   Lab Units 05/25/25  0854   HEMOGLOBIN A1C % 5.6     Last 24 Hours Medication List:     Current Facility-Administered Medications:     aluminum-magnesium hydroxide-simethicone (MAALOX) oral suspension 30 mL, Q6H PRN    atorvastatin (LIPITOR) tablet 80 mg, Daily With Dinner    heparin (porcine) 25,000 units in 0.45% NaCl 250 mL infusion (premix), Titrated, Last Rate: 14 Units/kg/hr (05/27/25 0850)    heparin (porcine) injection 1,800 Units, Q6H PRN    heparin (porcine) injection 3,600 Units, Q6H PRN    metoprolol tartrate (LOPRESSOR) partial tablet 37.5 mg, Q12H ROMELIA    nicotine (NICODERM CQ) 21 mg/24 hr TD 24 hr patch 1 patch, Daily    ondansetron (ZOFRAN) injection 4 mg, Q6H PRN    polyethylene glycol (MIRALAX) packet 17 g, Daily PRN    Administrative Statements   Today, Patient Was Seen By: Wander Cintron PA-C    **Please Note: This note may have been constructed using a voice recognition system.**

## 2025-05-27 NOTE — ASSESSMENT & PLAN NOTE
Holding PTA lisinopril and HCTZ in anticipation of cardiac cath  Continues on Lopressor 37.5 mg twice daily  Of note patient does have history of myalgias with amlodipine  BP elevated in the 150s up to 180 yesterday evening  Discussed Dr. Watson.  Will trial low-dose nifedipine 10 mg twice daily.

## 2025-05-27 NOTE — ASSESSMENT & PLAN NOTE
Presented with fatigue and noted to be in atrial fibrillation with RVR  No known history of atrial fibrillation, KAREN, no recent illness, no alcohol use  Weaned off Cardizem drip, continue Lopressor 37.5 mg twice daily  Continues to remain in normal sinus rhythm  TSH within normal limits  BNP is elevated to 298 and chest x-ray with congestion,s/p IV Lasix x1 with improvement in peripheral edema per pt  Pending formal echocardiogram read, no recent  [id number]= 3, started on Eliquis which was price checked (currently on hold and on hep gtt pending cath then to resume post cath)  Continue telemetry monitoring

## 2025-05-27 NOTE — APP STUDENT NOTE
[facility] STUDENT  Inpatient Progress Note for TRAINING ONLY  Not Part of Legal Medical Record       Progress Note - Hyun Anthony 69 y.o. female MRN: [id number]    Unit/Bed#: E4 -01 Encounter: [id number]      Assessment & Plan:  New onset a-fib (HCC)   70 y/o female with PMH of tobacco use, HTN, and hyperlipidemia presented to the ED on 5/24 and was found to be in rapid a-fib with RVR on bedside monitor.  No known history of a-fib, KAREN, recent illness  Patient has returned to sinus rhythm and remains in sinus rhythm   TSH within normal limits  BNP is elevated to 298 and chest x-ray with congestion, received 1 dose of IV Lasix 5/25 with improvement in peripheral edema per pt. No further Lasix.   [id number]= 3  Anticoagulated, started on Eliquis which was price checked (currently on hold and on hep gtt pending cath then to resume post cath)   Weaned off Cardizem drip and now on only  Lopressor 37.5 mg twice daily  Follow up echocardiogram scheduled    Continue telemetry monitoring   Cardiology following       Essential hypertension  Holding PTA lisinopril and HCTZ in anticipation of cardiac cath  Continues on Lopressor 37.5 mg twice daily  Of note patient does have history of myalgias with amlodipine  BP elevated in the 150s up to 180 yesterday evening  Discussed Dr. Watson.  Will trial low-dose nifedipine 10 mg twice daily.    Chest pain  Patient reported symptoms of severe chest pressure and diaphoresis prior to presentation to Dr. Watson  Troponin last peaked at 387 when checked and EKG with nonspecific ST and T wave abnormalities with no EKG previously  Due to symptoms and above findings with risk factors including ongoing tobacco abuse and family history of CAD cardiology planning for cardiac catheterization today  Currently holding DAPT load at this time and will start on DOAC after coronary angiography when safe from bleeding standpoint, as per Dr. Watson.   Currently chest pain free  Continue beta-blocker  "with Lopressor, high-dose atorvastatin  Labs:  Lipid panel: LDL 97, HDL 80, triglycerides 62, cholesterol 189  A1c 5.6    Hyperlipidemia  Patient was on rosuvastatin 10 mg at home, on high-dose atorvastatin 80 mg here     Tobacco use  Continue nicotine replacement therapy, encourage smoking cessation     Subjective:   Patient seated comfortably in bed and in no acute distress. Says chest pain has resolved. Denies chest pressure or palpitations. Denies pain in her back, jaw, or shoulders. Denies numbness or tingling in her arms or legs. Has been tolerating meals well and denies abdominal pain. NPO today with cardiac cath scheduled this afternoon. Denies SOB and HARPER.     Objective:     Vitals: Blood pressure 158/81, pulse (!) 52, temperature 98 °F (36.7 °C), temperature source Temporal, resp. rate 20, height 5' 2\" (1.575 m), weight 60.3 kg (133 lb), SpO2 98%.,Body mass index is 24.33 kg/m².      Intake/Output Summary (Last 24 hours) at 5/27/2025 1257  Last data filed at 5/27/2025 0513  Gross per 24 hour   Intake 1487.06 ml   Output 900 ml   Net 587.06 ml       Physical Exam: Physical Exam  Constitutional:       General: She is not in acute distress.     Appearance: Normal appearance.   HENT:      Head: Normocephalic and atraumatic.     Eyes:      Extraocular Movements: Extraocular movements intact.      Pupils: Pupils are equal, round, and reactive to light.       Cardiovascular:      Rate and Rhythm: Normal rate and regular rhythm.      Pulses: Normal pulses.      Heart sounds: Normal heart sounds. No murmur heard.     No friction rub. No gallop.   Pulmonary:      Effort: Pulmonary effort is normal. No respiratory distress.      Breath sounds: Normal breath sounds.   Chest:      Chest wall: No tenderness.   Abdominal:      General: Abdomen is flat.      Palpations: Abdomen is soft.      Tenderness: There is no abdominal tenderness.     Musculoskeletal:         General: Normal range of motion.      Cervical back: " Normal range of motion and neck supple.     Skin:     General: Skin is warm.     Neurological:      General: No focal deficit present.      Mental Status: She is alert and oriented to person, place, and time.     Psychiatric:         Mood and Affect: Mood normal.         Behavior: Behavior normal.          Invasive Devices       Peripheral Intravenous Line  Duration             Peripheral IV 05/25/25 Dorsal (posterior);Left Forearm 2 days                    Lab, Imaging and other studies: Results Review Statement: No pertinent imaging studies reviewed.     Lab Results   Component Value Date/Time    HGBA1C 5.6 05/25/2025 08:54 AM    HGBA1C 5.5 10/27/2015 08:49 AM      Lab Results   Component Value Date/Time    INR 1.00 05/26/2025 02:42 PM        VTE Pharmacologic Prophylaxis: Reason for no pharmacologic prophylaxis Held for cardiac cath procedure.   VTE Mechanical Prophylaxis: sequential compression device

## 2025-05-27 NOTE — ASSESSMENT & PLAN NOTE
Patient reported symptoms of severe chest pressure and diaphoresis prior to presentation to Dr. Watson  Troponin last peaked at 387, EKG with nonspecific ST and T wave abnormalities at peak trop level with no EKG previously  Due to symptoms and above findings with risk factors including ongoing tobacco abuse and family history of CAD cardiology planning for cardiac catheterization Tuesday 5/27  Lipid panel: LDL 97, HDL 80, triglycerides 62, cholesterol 189  A1c 5.6  Currently on heparin drip, beta-blocker statin  Currently holding DAPT load per Dr. Watson  Currently chest pain-free

## 2025-05-27 NOTE — PLAN OF CARE
Problem: PAIN - ADULT  Goal: Verbalizes/displays adequate comfort level or baseline comfort level  Description: Interventions:  - Encourage patient to monitor pain and request assistance  - Assess pain using appropriate pain scale  - Administer analgesics as ordered based on type and severity of pain and evaluate response  - Implement non-pharmacological measures as appropriate and evaluate response  - Consider cultural and social influences on pain and pain management  - Notify physician/advanced practitioner if interventions unsuccessful or patient reports new pain  - Educate patient/family on pain management process including their role and importance of  reporting pain   - Provide non-pharmacologic/complimentary pain relief interventions  Outcome: Progressing     Problem: INFECTION - ADULT  Goal: Absence or prevention of progression during hospitalization  Description: INTERVENTIONS:  - Assess and monitor for signs and symptoms of infection  - Monitor lab/diagnostic results  - Monitor all insertion sites, i.e. indwelling lines, tubes, and drains  - Monitor endotracheal if appropriate and nasal secretions for changes in amount and color  - [place] appropriate cooling/warming therapies per order  - Administer medications as ordered  - Instruct and encourage patient and family to use good hand hygiene technique  - Identify and instruct in appropriate isolation precautions for identified infection/condition  Outcome: Progressing     Problem: SAFETY ADULT  Goal: Patient will remain free of falls  Description: INTERVENTIONS:  - Educate patient/family on patient safety including physical limitations  - Instruct patient to call for assistance with activity   - Consider consulting OT/PT to assist with strengthening/mobility based on AM PAC & JH-HLM score  - Consult OT/PT to assist with strengthening/mobility   - Keep Call bell within reach  - Keep bed low and locked with side rails adjusted as appropriate  - Keep  care items and personal belongings within reach  - Initiate and maintain comfort rounds    - Consider moving patient to room near nurses station  Outcome: Progressing  Goal: Maintain or return to baseline ADL function  Description: INTERVENTIONS:  -  Assess patient's ability to carry out ADLs; assess patient's baseline for ADL function and identify physical deficits which impact ability to perform ADLs (bathing, care of mouth/teeth, toileting, grooming, dressing, etc.)  - Assess/evaluate cause of self-care deficits   - Assess range of motion  - Assess patient's mobility; develop plan if impaired  - Assess patient's need for assistive devices and provide as appropriate  - Encourage maximum independence but intervene and supervise when necessary  - Involve family in performance of ADLs  - Assess for home care needs following discharge   - Consider OT consult to assist with ADL evaluation and planning for discharge  - Provide patient education as appropriate  - Monitor functional capacity and physical performance, use of AM PAC & JH-HLM   - Monitor gait, balance and fatigue with ambulation    Outcome: Progressing  Goal: Maintains/Returns to pre admission functional level  Description: INTERVENTIONS:  - Perform AM-PAC 6 Click Basic Mobility/ Daily Activity assessment daily.  - Set and communicate daily mobility goal to care team and patient/family/caregiver.   - Collaborate with rehabilitation services on mobility goals if consulted  - Ambulate patient 3 times a day  - Out of bed to chair 3 times a day   - Out of bed for meals 3 times a day  - Out of bed for toileting  - Record patient progress and toleration of activity level   Outcome: Progressing     Problem: DISCHARGE PLANNING  Goal: Discharge to home or other facility with appropriate resources  Description: INTERVENTIONS:  - Identify barriers to discharge w/patient and caregiver  - Arrange for needed discharge resources and transportation as appropriate  -  Identify discharge learning needs (meds, wound care, etc.)  - Arrange for interpretive services to assist at discharge as needed  - Refer to Case Management Department for coordinating discharge planning if the patient needs post-hospital services based on physician/advanced practitioner order or complex needs related to functional status, cognitive ability, or social support system  Outcome: Progressing     Problem: Knowledge Deficit  Goal: Patient/family/caregiver demonstrates understanding of disease process, treatment plan, medications, and discharge instructions  Description: Complete learning assessment and assess knowledge base.  Interventions:  - Provide teaching at level of understanding  - Provide teaching via preferred learning methods  Outcome: Progressing     Problem: CARDIOVASCULAR - ADULT  Goal: Maintains optimal cardiac output and hemodynamic stability  Description: INTERVENTIONS:  - Monitor I/O, vital signs and rhythm  - Monitor for S/S and trends of decreased cardiac output  - Administer and titrate ordered vasoactive medications to optimize hemodynamic stability  - Assess quality of pulses, skin color and temperature  - Assess for signs of decreased coronary artery perfusion  - Instruct patient to report change in severity of symptoms  Outcome: Progressing  Goal: Absence of cardiac dysrhythmias or at baseline rhythm  Description: INTERVENTIONS:  - Continuous cardiac monitoring, vital signs, obtain 12 lead EKG if ordered  - Administer antiarrhythmic and heart rate control medications as ordered  - Monitor electrolytes and administer replacement therapy as ordered  Outcome: Progressing

## 2025-05-28 ENCOUNTER — TELEPHONE (OUTPATIENT)
Dept: CARDIOLOGY CLINIC | Facility: CLINIC | Age: 70
End: 2025-05-28

## 2025-05-28 VITALS
BODY MASS INDEX: 24.48 KG/M2 | SYSTOLIC BLOOD PRESSURE: 150 MMHG | TEMPERATURE: 98.2 F | HEART RATE: 56 BPM | RESPIRATION RATE: 18 BRPM | OXYGEN SATURATION: 97 % | DIASTOLIC BLOOD PRESSURE: 83 MMHG | HEIGHT: 62 IN | WEIGHT: 133 LBS

## 2025-05-28 DIAGNOSIS — I48.91 NEW ONSET A-FIB (HCC): Primary | ICD-10-CM

## 2025-05-28 DIAGNOSIS — I48.91 A-FIB (HCC): Primary | ICD-10-CM

## 2025-05-28 LAB
ANION GAP SERPL CALCULATED.3IONS-SCNC: 6 MMOL/L (ref 4–13)
BUN SERPL-MCNC: 19 MG/DL (ref 5–25)
CALCIUM SERPL-MCNC: 8.8 MG/DL (ref 8.4–10.2)
CHLORIDE SERPL-SCNC: 108 MMOL/L (ref 96–108)
CO2 SERPL-SCNC: 24 MMOL/L (ref 21–32)
CREAT SERPL-MCNC: 0.66 MG/DL (ref 0.6–1.3)
ERYTHROCYTE [DISTWIDTH] IN BLOOD BY AUTOMATED COUNT: 12.7 % (ref 11.6–15.1)
GFR SERPL CREATININE-BSD FRML MDRD: 91 ML/MIN/1.73SQ M
GLUCOSE SERPL-MCNC: 92 MG/DL (ref 65–140)
HCT VFR BLD AUTO: 35 % (ref 34.8–46.1)
HGB BLD-MCNC: 11.7 G/DL (ref 11.5–15.4)
MCH RBC QN AUTO: 32.8 PG (ref 26.8–34.3)
MCHC RBC AUTO-ENTMCNC: 33.4 G/DL (ref 31.4–37.4)
MCV RBC AUTO: 98 FL (ref 82–98)
PLATELET # BLD AUTO: 186 THOUSANDS/UL (ref 149–390)
PMV BLD AUTO: 11.1 FL (ref 8.9–12.7)
POTASSIUM SERPL-SCNC: 4 MMOL/L (ref 3.5–5.3)
RBC # BLD AUTO: 3.57 MILLION/UL (ref 3.81–5.12)
SODIUM SERPL-SCNC: 138 MMOL/L (ref 135–147)
WBC # BLD AUTO: 6.53 THOUSAND/UL (ref 4.31–10.16)

## 2025-05-28 PROCEDURE — 99239 HOSP IP/OBS DSCHRG MGMT >30: CPT

## 2025-05-28 PROCEDURE — 85027 COMPLETE CBC AUTOMATED: CPT

## 2025-05-28 PROCEDURE — 80048 BASIC METABOLIC PNL TOTAL CA: CPT

## 2025-05-28 PROCEDURE — 99232 SBSQ HOSP IP/OBS MODERATE 35: CPT | Performed by: INTERNAL MEDICINE

## 2025-05-28 RX ORDER — METOPROLOL TARTRATE 25 MG/1
37.5 TABLET, FILM COATED ORAL EVERY 12 HOURS SCHEDULED
Qty: 90 TABLET | Refills: 0 | Status: SHIPPED | OUTPATIENT
Start: 2025-05-28

## 2025-05-28 RX ORDER — LISINOPRIL 20 MG/1
20 TABLET ORAL DAILY
Start: 2025-05-28

## 2025-05-28 RX ADMIN — APIXABAN 5 MG: 5 TABLET, FILM COATED ORAL at 08:55

## 2025-05-28 RX ADMIN — NICOTINE 1 PATCH: 21 PATCH, EXTENDED RELEASE TRANSDERMAL at 08:55

## 2025-05-28 RX ADMIN — METOPROLOL TARTRATE 25 MG: 25 TABLET, FILM COATED ORAL at 08:55

## 2025-05-28 NOTE — PLAN OF CARE
Problem: PAIN - ADULT  Goal: Verbalizes/displays adequate comfort level or baseline comfort level  Description: Interventions:  - Encourage patient to monitor pain and request assistance  - Assess pain using appropriate pain scale  - Administer analgesics as ordered based on type and severity of pain and evaluate response  - Implement non-pharmacological measures as appropriate and evaluate response  - Consider cultural and social influences on pain and pain management  - Notify physician/advanced practitioner if interventions unsuccessful or patient reports new pain  - Educate patient/family on pain management process including their role and importance of  reporting pain   - Provide non-pharmacologic/complimentary pain relief interventions  Outcome: Progressing     Problem: INFECTION - ADULT  Goal: Absence or prevention of progression during hospitalization  Description: INTERVENTIONS:  - Assess and monitor for signs and symptoms of infection  - Monitor lab/diagnostic results  - Monitor all insertion sites, i.e. indwelling lines, tubes, and drains  - Monitor endotracheal if appropriate and nasal secretions for changes in amount and color  - [place] appropriate cooling/warming therapies per order  - Administer medications as ordered  - Instruct and encourage patient and family to use good hand hygiene technique  - Identify and instruct in appropriate isolation precautions for identified infection/condition  Outcome: Progressing     Problem: SAFETY ADULT  Goal: Patient will remain free of falls  Description: INTERVENTIONS:  - Educate patient/family on patient safety including physical limitations  - Instruct patient to call for assistance with activity   - Consider consulting OT/PT to assist with strengthening/mobility based on AM PAC & JH-HLM score  - Consult OT/PT to assist with strengthening/mobility   - Keep Call bell within reach  - Keep bed low and locked with side rails adjusted as appropriate  - Keep  care items and personal belongings within reach  - Initiate and maintain comfort rounds    - Consider moving patient to room near nurses station  Outcome: Progressing  Goal: Maintain or return to baseline ADL function  Description: INTERVENTIONS:  -  Assess patient's ability to carry out ADLs; assess patient's baseline for ADL function and identify physical deficits which impact ability to perform ADLs (bathing, care of mouth/teeth, toileting, grooming, dressing, etc.)  - Assess/evaluate cause of self-care deficits   - Assess range of motion  - Assess patient's mobility; develop plan if impaired  - Assess patient's need for assistive devices and provide as appropriate  - Encourage maximum independence but intervene and supervise when necessary  - Involve family in performance of ADLs  - Assess for home care needs following discharge   - Consider OT consult to assist with ADL evaluation and planning for discharge  - Provide patient education as appropriate  - Monitor functional capacity and physical performance, use of AM PAC & JH-HLM   - Monitor gait, balance and fatigue with ambulation    Outcome: Progressing  Goal: Maintains/Returns to pre admission functional level  Description: INTERVENTIONS:  - Perform AM-PAC 6 Click Basic Mobility/ Daily Activity assessment daily.  - Set and communicate daily mobility goal to care team and patient/family/caregiver.   - Collaborate with rehabilitation services on mobility goals if consulted  - Ambulate patient 3 times a day  - Out of bed to chair 3 times a day   - Out of bed for meals 3 times a day  - Out of bed for toileting  - Record patient progress and toleration of activity level   Outcome: Progressing     Problem: DISCHARGE PLANNING  Goal: Discharge to home or other facility with appropriate resources  Description: INTERVENTIONS:  - Identify barriers to discharge w/patient and caregiver  - Arrange for needed discharge resources and transportation as appropriate  -  Identify discharge learning needs (meds, wound care, etc.)  - Arrange for interpretive services to assist at discharge as needed  - Refer to Case Management Department for coordinating discharge planning if the patient needs post-hospital services based on physician/advanced practitioner order or complex needs related to functional status, cognitive ability, or social support system  Outcome: Progressing     Problem: Knowledge Deficit  Goal: Patient/family/caregiver demonstrates understanding of disease process, treatment plan, medications, and discharge instructions  Description: Complete learning assessment and assess knowledge base.  Interventions:  - Provide teaching at level of understanding  - Provide teaching via preferred learning methods  Outcome: Progressing     Problem: CARDIOVASCULAR - ADULT  Goal: Maintains optimal cardiac output and hemodynamic stability  Description: INTERVENTIONS:  - Monitor I/O, vital signs and rhythm  - Monitor for S/S and trends of decreased cardiac output  - Administer and titrate ordered vasoactive medications to optimize hemodynamic stability  - Assess quality of pulses, skin color and temperature  - Assess for signs of decreased coronary artery perfusion  - Instruct patient to report change in severity of symptoms  Outcome: Progressing  Goal: Absence of cardiac dysrhythmias or at baseline rhythm  Description: INTERVENTIONS:  - Continuous cardiac monitoring, vital signs, obtain 12 lead EKG if ordered  - Administer antiarrhythmic and heart rate control medications as ordered  - Monitor electrolytes and administer replacement therapy as ordered  Outcome: Progressing

## 2025-05-28 NOTE — ASSESSMENT & PLAN NOTE
Presented with fatigue and noted to be in atrial fibrillation with RVR  No known history of atrial fibrillation, KAREN, no recent illness, no alcohol use  Weaned off Cardizem drip, continue Lopressor 37.5 mg twice daily  Continues to remain in normal sinus rhythm  TSH within normal limits  BNP is elevated to 298 and chest x-ray with congestion,s/p IV Lasix x1 with improvement in SOB  Update echo with biatrial dilation, grade 1 diastolic dysfunction  Clinically euvolemic on exam  [id number]= 3, based on Eliquis postcardiac catheterization.  Tolerating well  Outpatient cardiology follow-up, referral sent

## 2025-05-28 NOTE — APP STUDENT NOTE
[facility] STUDENT  Inpatient Progress Note for TRAINING ONLY  Not Part of Legal Medical Record       Progress Note - Hyun Anthony 69 y.o. female MRN: [id number]    Unit/Bed#: E4 -01 Encounter: [id number]      Assessment & Plan:    New onset a-fib (HCC)  Presented to ED 5/24 with chest pressure, fatigue, numbness in hands and found to be in atrial fibrillation with RVR  No known history of a-fib, KAREN, recent illness, alcohol abuse   Weaned off Cardizem drip, continue Lopressor 37.5 mg twice daily  Rhythm self terminated, continues to be in sinus rhythm   Eliquis 5 mg twice daily for thromboembolic prophylaxis   Continue atorvastatin 80 mg and lopressor 25 mg twice daily   Per cardiology, plan for outpatient event recorder to assess further burden of atrial fibrillation     Coronary artery disease  S/p cardiac cath with mild diffuse RCA disease and luminal irregularities of LAD  Continue with high intensity statin     Chest pain  Patient reported symptoms of severe chest pressure and diaphoresis prior to presentation to Dr. Watson  Troponin last peaked at 387, EKG with nonspecific ST and T wave abnormalities at peak trop level with no EKG previously  Due to symptoms and above findings with risk factors including ongoing tobacco abuse and family history of CAD cardiology planning for cardiac catheterization Tuesday 5/27  Lipid panel: LDL 97, HDL 80, triglycerides 62, cholesterol 189  A1c 5.6  Currently on Eliquis, Lopressor, statin  Currently holding DAPT load per Dr. Watson  Currently chest pain-free    Essential hypertension  LVEF 75%, severe LVH  Grade 1 diastolic dysfunction with increased LVOT gradient 35 mmHg at rest, mild bilateral dilatation, AV sclerosis, severe MAC, mild MR/TR  Continue metoprolol. If further BP management necessary, increase metoprolol as per cardiology.  Per cardiology, would not reinitiate HCTZ and lisinopril     Hyperlipidemia  Continue with high intensity statin. On 10 mg rosuvastatin at  "home, on 80 mg atorvastatin here.     Tobacco use   Cessation advised. Continue with nicotine patch for nicotine replacement therapy.     Subjective:   Patient seated comfortably in bed in no acute distress. Says she has been sleeping and eating well. No acute events overnight. Denies chest pain, palpitations, or SOB. Denies lower extremity pain or swelling. Feels well post cardiac cath.     Objective:     Vitals: Blood pressure 150/83, pulse 56, temperature 98.2 °F (36.8 °C), temperature source Temporal, resp. rate 18, height 5' 2\" (1.575 m), weight 60.3 kg (133 lb), SpO2 97%.,Body mass index is 24.33 kg/m².      Intake/Output Summary (Last 24 hours) at 5/28/2025 1150  Last data filed at 5/28/2025 0900  Gross per 24 hour   Intake 1440 ml   Output --   Net 1440 ml       Physical Exam: Physical Exam  Constitutional:       General: She is not in acute distress.     Appearance: Normal appearance.   HENT:      Head: Normocephalic and atraumatic.     Eyes:      Extraocular Movements: Extraocular movements intact.      Pupils: Pupils are equal, round, and reactive to light.       Cardiovascular:      Rate and Rhythm: Normal rate and regular rhythm.      Heart sounds: Normal heart sounds. No murmur heard.     No friction rub. No gallop.   Pulmonary:      Effort: Pulmonary effort is normal.      Breath sounds: Normal breath sounds.   Abdominal:      General: Abdomen is flat.      Palpations: Abdomen is soft.     Musculoskeletal:      Cervical back: Normal range of motion and neck supple.     Skin:     General: Skin is warm and dry.     Neurological:      General: No focal deficit present.      Mental Status: She is alert and oriented to person, place, and time.     Psychiatric:         Mood and Affect: Mood normal.         Behavior: Behavior normal.          Invasive Devices       Peripheral Intravenous Line  Duration             Peripheral IV 05/25/25 Dorsal (posterior);Left Forearm 3 days                    Lab, Imaging " "and other studies: {Results Review Statement:19390::\"No pertinent imaging studies reviewed.\"}  VTE Pharmacologic Prophylaxis: VTE covered by:  apixaban, Oral, 5 mg at 05/28/25 0855     VTE Mechanical Prophylaxis: sequential compression device    "

## 2025-05-28 NOTE — TELEPHONE ENCOUNTER
----- Message from Arnulfo Garcia PA-C sent at 5/28/2025  3:58 PM EDT -----  Regarding: Establish outpatient care posthospitalization  Pt hospitalized in [place] and seen by cardiology.  Had a catheterization and new atrial fibrillationAt there request of Dr. Alexander a 2 week zio patch was ordered for her.Will you please (clerical team) contact her to arrange an appointment to establish care and review zio result ... 2-4 weeks should be fine.Thank you.

## 2025-05-28 NOTE — PLAN OF CARE
Problem: PAIN - ADULT  Goal: Verbalizes/displays adequate comfort level or baseline comfort level  Description: Interventions:  - Encourage patient to monitor pain and request assistance  - Assess pain using appropriate pain scale  - Administer analgesics as ordered based on type and severity of pain and evaluate response  - Implement non-pharmacological measures as appropriate and evaluate response  - Consider cultural and social influences on pain and pain management  - Notify physician/advanced practitioner if interventions unsuccessful or patient reports new pain  - Educate patient/family on pain management process including their role and importance of  reporting pain   - Provide non-pharmacologic/complimentary pain relief interventions  Outcome: Progressing     Problem: INFECTION - ADULT  Goal: Absence or prevention of progression during hospitalization  Description: INTERVENTIONS:  - Assess and monitor for signs and symptoms of infection  - Monitor lab/diagnostic results  - Monitor all insertion sites, i.e. indwelling lines, tubes, and drains  - Monitor endotracheal if appropriate and nasal secretions for changes in amount and color  - [place] appropriate cooling/warming therapies per order  - Administer medications as ordered  - Instruct and encourage patient and family to use good hand hygiene technique  - Identify and instruct in appropriate isolation precautions for identified infection/condition  Outcome: Progressing     Problem: SAFETY ADULT  Goal: Patient will remain free of falls  Description: INTERVENTIONS:  - Educate patient/family on patient safety including physical limitations  - Instruct patient to call for assistance with activity   - Consider consulting OT/PT to assist with strengthening/mobility based on AM PAC & JH-HLM score  - Consult OT/PT to assist with strengthening/mobility   - Keep Call bell within reach  - Keep bed low and locked with side rails adjusted as appropriate  - Keep  care items and personal belongings within reach  - Initiate and maintain comfort rounds    - Consider moving patient to room near nurses station  Outcome: Progressing  Goal: Maintain or return to baseline ADL function  Description: INTERVENTIONS:  -  Assess patient's ability to carry out ADLs; assess patient's baseline for ADL function and identify physical deficits which impact ability to perform ADLs (bathing, care of mouth/teeth, toileting, grooming, dressing, etc.)  - Assess/evaluate cause of self-care deficits   - Assess range of motion  - Assess patient's mobility; develop plan if impaired  - Assess patient's need for assistive devices and provide as appropriate  - Encourage maximum independence but intervene and supervise when necessary  - Involve family in performance of ADLs  - Assess for home care needs following discharge   - Consider OT consult to assist with ADL evaluation and planning for discharge  - Provide patient education as appropriate  - Monitor functional capacity and physical performance, use of AM PAC & JH-HLM   - Monitor gait, balance and fatigue with ambulation    Outcome: Progressing  Goal: Maintains/Returns to pre admission functional level  Description: INTERVENTIONS:  - Perform AM-PAC 6 Click Basic Mobility/ Daily Activity assessment daily.  - Set and communicate daily mobility goal to care team and patient/family/caregiver.   - Collaborate with rehabilitation services on mobility goals if consulted  - Ambulate patient 3 times a day  - Out of bed to chair 3 times a day   - Out of bed for meals 3 times a day  - Out of bed for toileting  - Record patient progress and toleration of activity level   Outcome: Progressing     Problem: DISCHARGE PLANNING  Goal: Discharge to home or other facility with appropriate resources  Description: INTERVENTIONS:  - Identify barriers to discharge w/patient and caregiver  - Arrange for needed discharge resources and transportation as appropriate  -  Identify discharge learning needs (meds, wound care, etc.)  - Arrange for interpretive services to assist at discharge as needed  - Refer to Case Management Department for coordinating discharge planning if the patient needs post-hospital services based on physician/advanced practitioner order or complex needs related to functional status, cognitive ability, or social support system  Outcome: Progressing     Problem: Knowledge Deficit  Goal: Patient/family/caregiver demonstrates understanding of disease process, treatment plan, medications, and discharge instructions  Description: Complete learning assessment and assess knowledge base.  Interventions:  - Provide teaching at level of understanding  - Provide teaching via preferred learning methods  Outcome: Progressing     Problem: CARDIOVASCULAR - ADULT  Goal: Maintains optimal cardiac output and hemodynamic stability  Description: INTERVENTIONS:  - Monitor I/O, vital signs and rhythm  - Monitor for S/S and trends of decreased cardiac output  - Administer and titrate ordered vasoactive medications to optimize hemodynamic stability  - Assess quality of pulses, skin color and temperature  - Assess for signs of decreased coronary artery perfusion  - Instruct patient to report change in severity of symptoms  Outcome: Progressing  Goal: Absence of cardiac dysrhythmias or at baseline rhythm  Description: INTERVENTIONS:  - Continuous cardiac monitoring, vital signs, obtain 12 lead EKG if ordered  - Administer antiarrhythmic and heart rate control medications as ordered  - Monitor electrolytes and administer replacement therapy as ordered  Outcome: Progressing

## 2025-05-28 NOTE — TELEPHONE ENCOUNTER
Order placed for Zio to be mailed to patient home.     Placed call to patient to review Zio instructions. No answer. Left vm for patient to return call.     Ok to discuss with CTS.

## 2025-05-28 NOTE — DISCHARGE INSTR - AVS FIRST PAGE
Hyun,     You were started on metoprolol tartrate twice daily.  You were also started on Eliquis twice daily.  These medications were sent to [facility].     The metoprolol is for your heart rate and blood pressure, the Eliquis is to prevent stroke    The cardiology team is recommending to hold your hydrochlorothiazide at this time.    Since you are starting on metoprolol, you can reduce your dose of lisinopril to once a day. Pending your cardiology evaluation, they can discuss dose adjustments.     Please see your family doctor in 1 to 2 weeks of your hospital stay    Maintain a low sodium diet. If you develop worsening shortness of breath, chest pain, heart palpitations or dizziness please return to the ED.    Keep a log of your blood pressure.

## 2025-05-28 NOTE — ASSESSMENT & PLAN NOTE
Patient reported symptoms of severe chest pressure and diaphoresis prior to presentation to Dr. Watson  Troponin last peaked at 387, EKG with nonspecific ST and T wave abnormalities at peak trop level with no EKG previously  Due to symptoms and above findings with risk factors including ongoing tobacco abuse and family history of CAD patient underwent cardiac catheterization 5/27/2025   No epicardial obstructive CAD, LV end-diastolic pressure normal  Suspect chest pain in setting of atrial fibrillation RVR, this has resolved since rate and rhythm control  No symptoms at rest or exertion    Resolved

## 2025-05-28 NOTE — ASSESSMENT & PLAN NOTE
PTA on lisinopril and HCTZ which were held in anticipation of cardiac cath  Due to 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 cardiology recommending to discontinue hydrochlorothiazide  Of note patient does have history of myalgias with amlodipine  Continue increased dose Lopressor 37.5 mg twice daily  Still with suboptimal BP control in the 150s, will continue with reduced dose lisinopril from 20 mg twice a day to 20 mg daily  Outpatient PCP follow-up in 1 week  OP Cardiology follow-up  Encourage patient to keep a log for blood pressure

## 2025-05-28 NOTE — DISCHARGE SUMMARY
Discharge Summary - Hospitalist   Name: Hyun Anthony 69 y.o. female I MRN: [id number]  Unit/Bed#: E4 -01 I Date of Admission: 5/24/2025   Date of Service: 5/28/2025 I Hospital Day: 4     Assessment & Plan  New onset a-fib (HCC)  Presented with fatigue and noted to be in atrial fibrillation with RVR  No known history of atrial fibrillation, KAREN, no recent illness, no alcohol use  Weaned off Cardizem drip, continue Lopressor 37.5 mg twice daily  Continues to remain in normal sinus rhythm  TSH within normal limits  BNP is elevated to 298 and chest x-ray with congestion,s/p IV Lasix x1 with improvement in SOB  Update echo with biatrial dilation, grade 1 diastolic dysfunction  Clinically euvolemic on exam  [id number]= 3, based on Eliquis postcardiac catheterization.  Tolerating well  Outpatient cardiology follow-up, referral sent  Chest pain  Patient reported symptoms of severe chest pressure and diaphoresis prior to presentation to Dr. Watson  Troponin last peaked at 387, EKG with nonspecific ST and T wave abnormalities at peak trop level with no EKG previously  Due to symptoms and above findings with risk factors including ongoing tobacco abuse and family history of CAD patient underwent cardiac catheterization 5/27/2025   No epicardial obstructive CAD, LV end-diastolic pressure normal  Suspect chest pain in setting of atrial fibrillation RVR, this has resolved since rate and rhythm control  No symptoms at rest or exertion    Resolved  Essential hypertension  PTA on lisinopril and HCTZ which were held in anticipation of cardiac cath  Due to 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 cardiology recommending to discontinue hydrochlorothiazide  Of note patient does have history of myalgias with amlodipine  Continue increased dose Lopressor 37.5 mg twice daily  Still with suboptimal BP control in the 150s, will continue with reduced dose lisinopril from 20 mg twice a day to 20 mg daily  Outpatient PCP follow-up in 1 week  OP  Cardiology follow-up  Encourage patient to keep a log for blood pressure  Hyperlipidemia  Continue home rosuvastatin  Tobacco use  Encourage smoking cessation     Medical Problems       Resolved Problems  Date Reviewed: 4/1/2025   None       Discharging Physician / Practitioner: Wander Cintron PA-C  PCP: REDD Wu  Admission Date:   Admission Orders (From admission, onward)       Ordered        05/24/25 1258  INPATIENT ADMISSION  Once                          Discharge Date: 05/28/25    Next Steps for Physician/AP Assuming Care:  Outpatient PCP follow-up 1 week  Outpatient cardiology follow-up    Test Results Pending at Discharge (will require follow up):  Further cardiac testing per outpatient cardiology    Medication Changes for Discharge & Rationale:   Hold HCTZ due to LVOT obstruction  Added metoprolol and Eliquis for atrial fibrillation    See after visit summary for reconciled discharge medications provided to patient and/or family.     Consultations During Hospital Stay:  Cardiology    Procedures Performed:   Cardiac catheterization 5/27/2025    Significant Findings / Test Results:   Cardiac catheterization  Result Date: 5/27/2025  Narrative: No epicardial Obstructive CAD Nl LVEDP Right Radial Artery Loop     Echo complete w/ contrast if indicated  Result Date: 5/27/2025  Narrative:   Left Ventricle: Left ventricular cavity size is normal. Wall thickness is severely increased. The left ventricular ejection fraction is 75%. Systolic function is vigorous. Wall motion is normal. Diastolic function is mildly abnormal, consistent with grade I (abnormal) relaxation. There is outflow tract dynamic obstruction at rest with a peak gradient of 35.0 mmHg, that does not seem to increase with valsalva.   Left Atrium: The atrium is mildly dilated.   Right Atrium: The atrium is mildly dilated.   Aortic Valve: There is aortic valve sclerosis.   Mitral Valve: The leaflets exhibit moderatesystolic anterior  "motion with septal contact. There is severe annular calcification. There is mild regurgitation.   Tricuspid Valve: There is mild regurgitation.   IVC/SVC: The inferior vena cava is dilated. Respirophasic changes were normal.     XR chest 1 view portable  Result Date: 5/24/2025  Impression: Mild pulmonary venous congestion with trace pleural effusions.     Hospital Course:   Hyun Anthony is a 69 y.o. female patient who originally presented to the hospital on 5/24/2025 due to fatigue and feeling that something was \"not right\". Patient was noted to be in atrial fibrillation with RVR in the emergency room and started on Cardizem and heparin drip.  Patient was started on Lopressor 25 mg twice daily and had dose increased to 37.5 mg twice daily with stable heart rate and blood pressure control.  Patient converted to normal sinus rhythm and remained in normal sinus rhythm with resumption of her symptoms.  Due to elevated BNP with chest x-ray congestion and dilated IVC patient underwent a dose of IV Lasix with improvement.  Due to elevated troponins and occurrence of chest pain prior to admission patient underwent cardiac catheterization on 5/27/2025 which did not display any obstructive CAD.    On day of discharge patient was feeling well.  She had resumption of all symptoms at rest and on exertion.  Patient's heart ultrasound was reviewed with cardiology.  Patient does have grade 1 diastolic dysfunction with LVOT.  She is euvolemic on exam.  She is recommended to hold her hydrochlorothiazide on discharge and continue her beta-blocker with Eliquis.  Patient is tolerating her new medications well will be discharged home with outpatient cardiology and PCP follow-up.    Discharge Day Visit / Exam:   Subjective: Patient seen and examined lying in bed.  States she feels well and is anxious to leave here.  She has work to do at home.  She denies any chest pain, shortness of breath, heart palpitations or lower extremity " "swelling.  No symptoms ambulating around the room.    Vitals: Blood Pressure: 150/83 (05/28/25 1043)  Pulse: 56 (05/28/25 1043)  Temperature: 98.2 °F (36.8 °C) (05/28/25 1043)  Temp Source: Temporal (05/28/25 1043)  Respirations: 18 (05/28/25 1043)  Height: 5' 2\" (157.5 cm) (05/26/25 1448)  Weight - Scale: 60.3 kg (133 lb) (05/26/25 1448)  SpO2: 97 % (05/28/25 1043)    Physical Exam  Vitals and nursing note reviewed.   Constitutional:       Appearance: Normal appearance. She is not ill-appearing or diaphoretic.     Cardiovascular:      Rate and Rhythm: Normal rate and regular rhythm.   Pulmonary:      Effort: Pulmonary effort is normal. No respiratory distress.      Breath sounds: Normal breath sounds.      Comments: Room air  Abdominal:      General: Bowel sounds are normal.      Palpations: Abdomen is soft.      Tenderness: There is no abdominal tenderness.     Musculoskeletal:      Right lower leg: No edema.      Left lower leg: No edema.     Skin:     General: Skin is warm and dry.     Neurological:      Mental Status: She is alert and oriented to person, place, and time. Mental status is at baseline.         Discharge instructions/Information to patient and family:   See after visit summary for information provided to patient and family.      Provisions for Follow-Up Care:  See after visit summary for information related to follow-up care and any pertinent home health orders.      Mobility at time of Discharge:   Basic Mobility Inpatient Raw Score: 24  JH-HLM Goal: 8: Walk 250 feet or more  JH-HLM Achieved: 8: Walk 250 feet ot more  HLM Goal achieved. Continue to encourage appropriate mobility.     Disposition:   Home    Planned Readmission: No    Administrative Statements   Discharge Statement:  I have spent a total time of 45 minutes in caring for this patient on the day of the visit/encounter.     **Please Note: This note may have been constructed using a voice recognition system**  "

## 2025-05-29 ENCOUNTER — TRANSITIONAL CARE MANAGEMENT (OUTPATIENT)
Dept: FAMILY MEDICINE CLINIC | Facility: CLINIC | Age: 70
End: 2025-05-29

## 2025-05-29 NOTE — UTILIZATION REVIEW
NOTIFICATION OF ADMISSION DISCHARGE   This is a Notification of Discharge from [facility]. Please be advised that this patient has been discharge from our facility. Below you will find the admission and discharge date and time including the patient’s disposition.   UTILIZATION REVIEW CONTACT:  Utilization Review Assistants  Network Utilization Review Department  Phone: [phone] x carefully listen to the prompts. All voicemails are confidential.  Email: [email or web address]     ADMISSION INFORMATION  PRESENTATION DATE: 5/24/2025 10:18 AM  OBERVATION ADMISSION DATE: N/A  INPATIENT ADMISSION DATE: 5/24/25 12:58 PM   DISCHARGE DATE: 5/28/2025  3:35 PM   DISPOSITION:Home/Self Care    Network Utilization Review Department  ATTENTION: Please call with any questions or concerns to [phone] and carefully listen to the prompts so that you are directed to the right person. All voicemails are confidential.   For Discharge needs, contact Care Management DC Support Team at [phone] opt. 2  Send all requests for admission clinical reviews, approved or denied determinations and any other requests to dedicated fax number below belonging to the campus where the patient is receiving treatment. List of dedicated fax numbers for the Facilities:  FACILITY NAME UR FAX NUMBER   ADMISSION DENIALS (Administrative/Medical Necessity) [phone]   DISCHARGE SUPPORT TEAM ([facility]) [phone]   PARENT CHILD HEALTH (Maternity/NICU/Pediatrics) [phone]   [facility] [phone]   [facility] [phone]   [facility] [phone]   [facility] [phone]   [facility] [phone]   [facility] [phone]   [facility] [phone]   [facility] [phone]   [facility]  [facility] [phone]   [facility] [phone]   [facility] [phone]   [facility] [phone]

## 2025-06-09 ENCOUNTER — OFFICE VISIT (OUTPATIENT)
Dept: FAMILY MEDICINE CLINIC | Facility: CLINIC | Age: 70
End: 2025-06-09
Payer: COMMERCIAL

## 2025-06-09 VITALS
DIASTOLIC BLOOD PRESSURE: 70 MMHG | HEIGHT: 62 IN | HEART RATE: 53 BPM | TEMPERATURE: 97.9 F | SYSTOLIC BLOOD PRESSURE: 120 MMHG | OXYGEN SATURATION: 99 % | BODY MASS INDEX: 22.82 KG/M2 | WEIGHT: 124 LBS

## 2025-06-09 DIAGNOSIS — I10 ESSENTIAL HYPERTENSION: ICD-10-CM

## 2025-06-09 DIAGNOSIS — R01.1 SYSTOLIC EJECTION MURMUR: ICD-10-CM

## 2025-06-09 DIAGNOSIS — M25.511 ACUTE PAIN OF RIGHT SHOULDER: ICD-10-CM

## 2025-06-09 DIAGNOSIS — I48.91 NEW ONSET A-FIB (HCC): Primary | ICD-10-CM

## 2025-06-09 PROCEDURE — 99495 TRANSJ CARE MGMT MOD F2F 14D: CPT | Performed by: NURSE PRACTITIONER

## 2025-06-09 NOTE — ASSESSMENT & PLAN NOTE
Echo done inpatient- mitral valve regurgitation seen. . There is severe annular calcification

## 2025-06-09 NOTE — PATIENT INSTRUCTIONS
Continue to follow with cardiology.     Stretching for shoulder.     Please call the office if you are experiencing any worsening of symptoms or no symptom improvement.

## 2025-06-09 NOTE — PROGRESS NOTES
Transition of Care Visit:  Name: Hyun Anthony      : 1955      MRN: [id number]  Encounter Provider: REDD Wu  Encounter Date: 2025   Encounter department: [facility]    Assessment & Plan  New onset a-fib (HCC)  Overall doing well-feeling better.   Continue with eliquis and metoprolol  Continue to follow with cardiology  Alvaroo patch currently on        Essential hypertension  Blood pressure stable/ well controlled.        Systolic ejection murmur  Echo done inpatient- mitral valve regurgitation seen. . There is severe annular calcification          Acute pain of right shoulder  No acute findings/ no injury. Will continue to monitor. Defer on imaging at this time. Red flag symptoms reviewed.   PT if no improvement.               History of Present Illness     Transitional Care Management Review:   Hyun Anthony is a 69 y.o. female here for TCM follow up.     During the TCM phone call patient stated:  TCM Call (since 2025)     Date and time call was made  2025 10:11 AM    Hospital care reviewed  Records reviewed    Patient was hospitialized at  [facility]    Date of Admission  25    Date of discharge  25    Diagnosis  New on set A fib    Disposition  Home    Were the patients medications reviewed and updated  Yes    Current Symptoms  None      TCM Call (since 2025)     Post hospital issues  None    Scheduled for follow up?  Yes    Patients specialists  Cardiologist    Did you obtain your prescribed medications  Yes    Do you need help managing your prescriptions or medications  No    Is transportation to your appointment needed  No    I have advised the patient to call PCP with any new or worsening symptoms  Nela Burrell MA    Living Arrangements  Family members; Spouse or Significiant other    Support System  None        Patient presents to the office today for hospital follow-up.  She was admitted to [facility] [address] on  "5/24 discharged 5/28.     Hospital Course:   \"Hyun Anthony is a 69 y.o. female patient who originally presented to the hospital on 5/24/2025 due to fatigue and feeling that something was \"not right\". Patient was noted to be in atrial fibrillation with RVR in the emergency room and started on Cardizem and heparin drip.  Patient was started on Lopressor 25 mg twice daily and had dose increased to 37.5 mg twice daily with stable heart rate and blood pressure control.  Patient converted to normal sinus rhythm and remained in normal sinus rhythm with resumption of her symptoms.  Due to elevated BNP with chest x-ray congestion and dilated IVC patient underwent a dose of IV Lasix with improvement.  Due to elevated troponins and occurrence of chest pain prior to admission patient underwent cardiac catheterization on 5/27/2025 which did not display any obstructive CAD.     On day of discharge patient was feeling well.  She had resumption of all symptoms at rest and on exertion.  Patient's heart ultrasound was reviewed with cardiology.  Patient does have grade 1 diastolic dysfunction with LVOT.  She is euvolemic on exam.  She is recommended to hold her hydrochlorothiazide on discharge and continue her beta-blocker with Eliquis.  Patient is tolerating her new medications well will be discharged home with outpatient cardiology and PCP follow-up.\"    cardiology appt for 6/30     Dizziness and lightheadedness is improving.     Right shoulder pain getting out of bed the other day- limited ROM in shoulder joint due to pain. Feels like she tweaked it. Didn't do anything / no injury. ROM intact but it does hurt.         Review of Systems   Constitutional:  Negative for chills and fever.   Eyes:  Negative for discharge.   Respiratory:  Negative for shortness of breath.    Cardiovascular:  Negative for chest pain.   Gastrointestinal:  Negative for constipation and diarrhea.   Genitourinary:  Negative for difficulty urinating. " "  Musculoskeletal:  Negative for joint swelling.   Skin:  Negative for rash.   Neurological:  Negative for headaches.   Hematological:  Negative for adenopathy.   Psychiatric/Behavioral:  The patient is not nervous/anxious.      Objective   /70   Pulse (!) 53   Temp 97.9 °F (36.6 °C)   Ht 5' 2\" (1.575 m)   Wt 56.2 kg (124 lb)   SpO2 99%   BMI 22.68 kg/m²     Physical Exam  Vitals and nursing note reviewed.   Constitutional:       General: She is not in acute distress.     Appearance: Normal appearance. She is well-developed. She is not diaphoretic.   HENT:      Head: Normocephalic and atraumatic.      Right Ear: External ear normal.      Left Ear: External ear normal.     Eyes:      General: Lids are normal.         Right eye: No discharge.         Left eye: No discharge.      Conjunctiva/sclera: Conjunctivae normal.       Cardiovascular:      Rate and Rhythm: Normal rate and regular rhythm.      Heart sounds: Murmur heard.   Pulmonary:      Effort: Pulmonary effort is normal. No respiratory distress.      Breath sounds: Normal breath sounds. No wheezing.     Musculoskeletal:         General: No deformity.     Skin:     General: Skin is warm and dry.     Neurological:      General: No focal deficit present.      Mental Status: She is alert and oriented to person, place, and time.     Psychiatric:         Speech: Speech normal.         Behavior: Behavior normal.         Thought Content: Thought content normal.         Judgment: Judgment normal.       Medications have been reviewed by provider in current encounter      "

## 2025-06-09 NOTE — ASSESSMENT & PLAN NOTE
Overall doing well-feeling better.   Continue with eliquis and metoprolol  Continue to follow with cardiology  Zio patch currently on

## 2025-06-26 ENCOUNTER — OFFICE VISIT (OUTPATIENT)
Dept: CARDIOLOGY CLINIC | Facility: CLINIC | Age: 70
End: 2025-06-26
Payer: COMMERCIAL

## 2025-06-26 VITALS
WEIGHT: 125 LBS | HEIGHT: 62 IN | BODY MASS INDEX: 23 KG/M2 | SYSTOLIC BLOOD PRESSURE: 160 MMHG | HEART RATE: 71 BPM | DIASTOLIC BLOOD PRESSURE: 72 MMHG | OXYGEN SATURATION: 98 %

## 2025-06-26 DIAGNOSIS — I10 HTN (HYPERTENSION), BENIGN: Primary | ICD-10-CM

## 2025-06-26 DIAGNOSIS — I48.91 NEW ONSET A-FIB (HCC): ICD-10-CM

## 2025-06-26 DIAGNOSIS — I48.91 ATRIAL FIBRILLATION WITH RAPID VENTRICULAR RESPONSE (HCC): ICD-10-CM

## 2025-06-26 DIAGNOSIS — F17.200 TOBACCO USE DISORDER: ICD-10-CM

## 2025-06-26 DIAGNOSIS — I10 ESSENTIAL HYPERTENSION: ICD-10-CM

## 2025-06-26 PROCEDURE — 99214 OFFICE O/P EST MOD 30 MIN: CPT | Performed by: INTERNAL MEDICINE

## 2025-06-26 RX ORDER — LOSARTAN POTASSIUM AND HYDROCHLOROTHIAZIDE 25; 100 MG/1; MG/1
1 TABLET ORAL DAILY
Qty: 90 TABLET | Refills: 3 | Status: SHIPPED | OUTPATIENT
Start: 2025-06-26

## 2025-06-26 RX ORDER — NICOTINE 21 MG/24HR
1 PATCH, TRANSDERMAL 24 HOURS TRANSDERMAL EVERY 24 HOURS
Qty: 28 PATCH | Refills: 0 | Status: SHIPPED | OUTPATIENT
Start: 2025-06-26

## 2025-06-26 RX ORDER — METOPROLOL TARTRATE 25 MG/1
37.5 TABLET, FILM COATED ORAL EVERY 12 HOURS SCHEDULED
Qty: 180 TABLET | Refills: 5 | Status: SHIPPED | OUTPATIENT
Start: 2025-06-26

## 2025-06-26 NOTE — PROGRESS NOTES
Cardiology             Hyun Mcdonaldr  1955  [id number]              Assessment/Plan:    Paroxysmal atrial fibrillation/flutter with spontaneous conversion to sinus rhythm during hospitalization 5/2025  Mild dynamic resting LVOT obstruction, peak gradient 35 mmHg without increase with Valsalva on echocardiogram 5/26/2025  Hypertension  Normal cardiac catheterization 5/27/2025  Dyslipidemia  Coronary disease      Regular rhythm on examination.  Recent ZIO with no recurrent atrial fibrillation.  Continue metoprolol and Eliquis  Blood pressure elevated at home.  Home blood pressure log reviewed, systolic readings up to the 190s at times.  Will discontinue lisinopril and start losartan/HCTZ in its place.  Explained the patient risk of dehydration may increase LVOT gradient.  She will maintain adequate hydration especially during the summer months.  She will let me know if she feels any more shortness of breath.  Continue metoprolol.  Amlodipine has caused myalgias in the remote past.  May consider trying nifedipine if we need additional blood pressure control.  Continue rosuvastatin for dyslipidemia        Follow-up in 1 month after CMP/CBC        Interval History:     This is a 69-year-old female hospitalized 5/2025 with new onset atrial fibrillation/flutter with rapid ventricular response along with symptoms of chest discomfort and diaphoresis.  High sensitive troponin was significantly elevated.  She spontaneously went back to sinus rhythm.  Cardiac catheterization on 5/27/2025 demonstrated no epicardial obstructive CAD.  LVEDP was normal.  She was maintained on metoprolol and Eliquis for A-fib.  She remained on statin therapy for dyslipidemia and amlodipine for hypertension.  Echocardiogram 5/26/2025 had revealed left ejection fraction 75% with dynamic LVOT obstruction with peak gradient 35 mmHg at rest which did not increase with Valsalva.  There was mild biatrial dilatation with moderate systolic  "anterior motion of the mitral valve with septal contact.  She subsequently underwent outpatient Zio on 5/28/2025 which revealed sinus rhythm and no evidence of recurrent atrial fibrillation or flutter.    She presents today for follow-up.  She complains of feeling fatigued.  She states home blood pressure readings have been quite elevated.                       Vitals:  Vitals:    06/26/25 1419   BP: 160/72   Pulse: 71   SpO2: 98%   Weight: 56.7 kg (125 lb)   Height: 5' 2\" (1.575 m)         Past Medical History[1]  Social History     Socioeconomic History   • Marital status: /Civil Union     Spouse name: Not on file   • Number of children: 2   • Years of education: Not on file   • Highest education level: Not on file   Occupational History   • Not on file   Tobacco Use   • Smoking status: Every Day     Current packs/day: 1.00     Types: Cigarettes   • Smokeless tobacco: Never   • Tobacco comments:     TOBACCO USE   Vaping Use   • Vaping status: Never Used   Substance and Sexual Activity   • Alcohol use: Not Currently     Comment: BEING A SOCIAL DRINKER   • Drug use: No   • Sexual activity: Yes   Other Topics Concern   • Not on file   Social History Narrative    ALWAYS USES SEAT BELT    CAFFEINE USE    Mandaeism     Social Drivers of Health     Financial Resource Strain: Medium Risk (3/6/2023)    Overall Financial Resource Strain (CARDIA)    • Difficulty of Paying Living Expenses: Somewhat hard   Food Insecurity: No Food Insecurity (5/24/2025)    Nursing - Inadequate Food Risk Classification    • Worried About Running Out of Food in the Last Year: Never true    • Ran Out of Food in the Last Year: Never true    • Ran Out of Food in the Last Year: Never true   Transportation Needs: No Transportation Needs (5/24/2025)    Nursing - Transportation Risk Classification    • Lack of Transportation: Not on file    • Lack of Transportation: No   Physical Activity: Not on file   Stress: Not on file   Social " Connections: Not on file   Intimate Partner Violence: Unknown (5/24/2025)    Nursing IPS    • Feels Physically and Emotionally Safe: Not on file    • Physically Hurt by Someone: Not on file    • Humiliated or Emotionally Abused by Someone: Not on file    • Physically Hurt by Someone: No    • Hurt or Threatened by Someone: No   Housing Stability: Unknown (5/24/2025)    Nursing: Inadequate Housing Risk Classification    • Has Housing: Not on file    • Worried About Losing Housing: Not on file    • Unable to Get Utilities: Not on file    • Unable to Pay for Housing in the Last Year: No    • Has Housing: No      Family History[2]  Past Surgical History[3]  Current Medications[4]        Review of Systems:  Review of Systems   Constitutional:  Positive for fatigue. Negative for activity change, fever and unexpected weight change.   HENT:  Negative for facial swelling, nosebleeds and voice change.    Respiratory:  Negative for chest tightness, shortness of breath and wheezing.    Cardiovascular:  Negative for chest pain, palpitations and leg swelling.   Gastrointestinal:  Negative for abdominal distention.   Genitourinary:  Negative for hematuria.   Musculoskeletal:  Negative for arthralgias.   Skin:  Negative for color change, pallor, rash and wound.   Neurological:  Negative for dizziness, seizures and syncope.   Psychiatric/Behavioral:  Negative for agitation.          Physical Exam:  Physical Exam  Vitals reviewed.   Constitutional:       Appearance: She is well-developed.   HENT:      Head: Normocephalic and atraumatic.     Cardiovascular:      Rate and Rhythm: Normal rate and regular rhythm.      Heart sounds: Normal heart sounds.   Pulmonary:      Effort: Pulmonary effort is normal.      Breath sounds: Normal breath sounds.   Abdominal:      Palpations: Abdomen is soft.     Musculoskeletal:         General: Normal range of motion.      Cervical back: Normal range of motion and neck supple.     Skin:     General:  Skin is warm and dry.     Neurological:      Mental Status: She is alert and oriented to person, place, and time.     Psychiatric:         Behavior: Behavior normal.         Thought Content: Thought content normal.         Judgment: Judgment normal.         This note was completed in part utilizing [employer] Direct Software.  Grammatical errors, random word insertions, spelling mistakes, and incomplete sentences can be an occasional consequence of this system secondary to software limitations, ambient noise, and hardware issues.  If you have any questions or concerns about the content, text, or information contained within the body of this dictation, please contact the provider for clarification.             [1]  Past Medical History:  Diagnosis Date   • Carotid artery stenosis    • Cyst of breast    • Dyspareunia in female    • Hyperlipidemia    • Hypertension    • Personal history of endometriosis     RESOLVED: 12/14/16   • Pre-diabetes     LAST ASSESSED: 12/14/16   [2]  Family History  Problem Relation Name Age of Onset   • Hypertension Mother          BENIGN ESSENTIAL   • Stroke Mother          March 26, 2018   • Hypertension Father          BENIGN ESSENTIAL   • Skin cancer Father     • Diverticulosis Father     • Diverticulitis Father     • Lung cancer Father     • Spina bifida Sister     • Colon cancer Maternal Grandfather     • Hypertension Brother          BENIGN ESSENTIAL   • No Known Problems Paternal Aunt     • No Known Problems Paternal Aunt     • Hyperlipidemia Family     • Hypertension Family     • Heart attack Family     • Breast cancer Neg Hx     [3]  Past Surgical History:  Procedure Laterality Date   • CARDIAC CATHETERIZATION N/A 5/27/2025    Procedure: Cardiac Catheterization;  Surgeon: Marcel Arredondo MD;  Location: [facility];  Service: Cardiology   • COLONOSCOPY      COMPLETE; 2009-2016-2021 [facility]   • COLPOSCOPY W/ BIOPSY / CURETTAGE      COLPOSCOPY CERVIX WITH BIOPSY(S) WITH  ENDOCERVICAL CURETTAGE; JANUARY 1981, JANUARY 02   • ENDOMETRIAL BIOPSY      BY SUCTION; A/24/99 WITH FOCAL CROWDING POSSIBLE SIMPLE HYPERPLASIA    • LAPAROSCOPIC TUBAL LIGATION Bilateral 11/1990 NOVEMBER 1990 WITH BILATERAL TUBAL ALONG WITH ELECTROCOAGULATION OF ENDOMETRIAL IMPLANT IN THE RIGHT UTEROSACRAL LIGAMENT AND RESECTION OF ANTERIOR ABDOMINAL WALL NEVUS    • MOUTH SURGERY     • THYROGLOSSAL DUCT EXCISION      CYST   • TONSILLECTOMY AND ADENOIDECTOMY     • TUBAL LIGATION     [4]    Current Outpatient Medications:   •  apixaban (Eliquis) 5 mg, Take 1 tablet (5 mg total) by mouth 2 (two) times a day, Disp: 180 tablet, Rfl: 5  •  Calcium Carb-Cholecalciferol 600-20 MG-MCG TABS, , Disp: , Rfl:   •  cholecalciferol (VITAMIN D3) 400 units tablet, Take 400 Units by mouth daily, Disp: , Rfl:   •  EPINEPHrine (EPIPEN) 0.3 mg/0.3 mL SOAJ, Inject 0.3 mL (0.3 mg total) into a muscle once for 1 dose, Disp: 0.6 mL, Rfl: 0  •  losartan-hydrochlorothiazide (HYZAAR) 100-25 MG per tablet, Take 1 tablet by mouth daily, Disp: 90 tablet, Rfl: 3  •  metoprolol tartrate (LOPRESSOR) 25 mg tablet, Take 1.5 tablets (37.5 mg total) by mouth every 12 (twelve) hours, Disp: 180 tablet, Rfl: 5  •  nicotine (NICODERM CQ) 14 mg/24hr TD 24 hr patch, Place 1 patch on the skin every 24 hours over 24 hours, Disp: 28 patch, Rfl: 0  •  rosuvastatin (CRESTOR) 10 MG tablet, Take 1 tablet (10 mg total) by mouth daily, Disp: 90 tablet, Rfl: 1

## 2025-07-18 ENCOUNTER — APPOINTMENT (OUTPATIENT)
Dept: LAB | Facility: CLINIC | Age: 70
End: 2025-07-18
Payer: COMMERCIAL

## 2025-07-18 DIAGNOSIS — I10 HTN (HYPERTENSION), BENIGN: ICD-10-CM

## 2025-07-18 DIAGNOSIS — I48.91 ATRIAL FIBRILLATION WITH RAPID VENTRICULAR RESPONSE (HCC): ICD-10-CM

## 2025-07-18 LAB
ALBUMIN SERPL BCG-MCNC: 4.4 G/DL (ref 3.5–5)
ALP SERPL-CCNC: 100 U/L (ref 34–104)
ALT SERPL W P-5'-P-CCNC: 18 U/L (ref 7–52)
ANION GAP SERPL CALCULATED.3IONS-SCNC: 8 MMOL/L (ref 4–13)
AST SERPL W P-5'-P-CCNC: 19 U/L (ref 13–39)
BASOPHILS # BLD AUTO: 0.03 THOUSANDS/ÂΜL (ref 0–0.1)
BASOPHILS NFR BLD AUTO: 0 % (ref 0–1)
BILIRUB SERPL-MCNC: 0.58 MG/DL (ref 0.2–1)
BUN SERPL-MCNC: 23 MG/DL (ref 5–25)
CALCIUM SERPL-MCNC: 9.7 MG/DL (ref 8.4–10.2)
CHLORIDE SERPL-SCNC: 102 MMOL/L (ref 96–108)
CO2 SERPL-SCNC: 24 MMOL/L (ref 21–32)
CREAT SERPL-MCNC: 0.62 MG/DL (ref 0.6–1.3)
EOSINOPHIL # BLD AUTO: 0.05 THOUSAND/ÂΜL (ref 0–0.61)
EOSINOPHIL NFR BLD AUTO: 1 % (ref 0–6)
ERYTHROCYTE [DISTWIDTH] IN BLOOD BY AUTOMATED COUNT: 12.6 % (ref 11.6–15.1)
GFR SERPL CREATININE-BSD FRML MDRD: 93 ML/MIN/1.73SQ M
GLUCOSE P FAST SERPL-MCNC: 115 MG/DL (ref 65–99)
HCT VFR BLD AUTO: 38.7 % (ref 34.8–46.1)
HGB BLD-MCNC: 13.1 G/DL (ref 11.5–15.4)
IMM GRANULOCYTES # BLD AUTO: 0.02 THOUSAND/UL (ref 0–0.2)
IMM GRANULOCYTES NFR BLD AUTO: 0 % (ref 0–2)
LYMPHOCYTES # BLD AUTO: 2.09 THOUSANDS/ÂΜL (ref 0.6–4.47)
LYMPHOCYTES NFR BLD AUTO: 27 % (ref 14–44)
MCH RBC QN AUTO: 32 PG (ref 26.8–34.3)
MCHC RBC AUTO-ENTMCNC: 33.9 G/DL (ref 31.4–37.4)
MCV RBC AUTO: 94 FL (ref 82–98)
MONOCYTES # BLD AUTO: 0.58 THOUSAND/ÂΜL (ref 0.17–1.22)
MONOCYTES NFR BLD AUTO: 8 % (ref 4–12)
NEUTROPHILS # BLD AUTO: 5 THOUSANDS/ÂΜL (ref 1.85–7.62)
NEUTS SEG NFR BLD AUTO: 64 % (ref 43–75)
NRBC BLD AUTO-RTO: 0 /100 WBCS
PLATELET # BLD AUTO: 242 THOUSANDS/UL (ref 149–390)
PMV BLD AUTO: 10.8 FL (ref 8.9–12.7)
POTASSIUM SERPL-SCNC: 4.2 MMOL/L (ref 3.5–5.3)
PROT SERPL-MCNC: 7 G/DL (ref 6.4–8.4)
RBC # BLD AUTO: 4.1 MILLION/UL (ref 3.81–5.12)
SODIUM SERPL-SCNC: 134 MMOL/L (ref 135–147)
WBC # BLD AUTO: 7.77 THOUSAND/UL (ref 4.31–10.16)

## 2025-07-18 PROCEDURE — 80053 COMPREHEN METABOLIC PANEL: CPT

## 2025-07-18 PROCEDURE — 85025 COMPLETE CBC W/AUTO DIFF WBC: CPT

## 2025-07-18 PROCEDURE — 36415 COLL VENOUS BLD VENIPUNCTURE: CPT

## 2025-07-23 ENCOUNTER — OFFICE VISIT (OUTPATIENT)
Dept: CARDIOLOGY CLINIC | Facility: CLINIC | Age: 70
End: 2025-07-23
Payer: COMMERCIAL

## 2025-07-23 VITALS
SYSTOLIC BLOOD PRESSURE: 174 MMHG | WEIGHT: 127 LBS | HEART RATE: 68 BPM | DIASTOLIC BLOOD PRESSURE: 60 MMHG | BODY MASS INDEX: 23.37 KG/M2 | HEIGHT: 62 IN

## 2025-07-23 DIAGNOSIS — I48.0 PAROXYSMAL ATRIAL FIBRILLATION (HCC): ICD-10-CM

## 2025-07-23 DIAGNOSIS — E78.5 DYSLIPIDEMIA: ICD-10-CM

## 2025-07-23 DIAGNOSIS — I10 ESSENTIAL HYPERTENSION: Primary | ICD-10-CM

## 2025-07-23 DIAGNOSIS — I48.91 ATRIAL FIBRILLATION WITH RAPID VENTRICULAR RESPONSE (HCC): ICD-10-CM

## 2025-07-23 PROCEDURE — 99214 OFFICE O/P EST MOD 30 MIN: CPT | Performed by: INTERNAL MEDICINE

## 2025-07-23 RX ORDER — METOPROLOL TARTRATE 50 MG
50 TABLET ORAL EVERY 12 HOURS SCHEDULED
Qty: 180 TABLET | Refills: 3 | Status: SHIPPED | OUTPATIENT
Start: 2025-07-23

## 2025-07-23 NOTE — PROGRESS NOTES
Cardiology             Hyun FRITZ Raj  1955  [id number]              Assessment/Plan:    Paroxysmal atrial fibrillation/flutter with spontaneous conversion to sinus rhythm during hospitalization 5/2025  Mild dynamic resting LVOT obstruction, peak gradient 35 mmHg without increase with Valsalva on echocardiogram 5/26/2025  Hypertension  Normal cardiac catheterization 5/27/2025  Dyslipidemia  Coronary disease      Regular rhythm on examination.  Prior Zio monitor with no recurrent atrial fibrillation.  Continue metoprolol and Eliquis.  Will raise metoprolol dose to 50 mg p.o. twice daily for better blood pressure control  Home blood pressure readings intermittently elevated.  Today's reading is high as well.  She does admit that stress can exacerbate her blood pressure and when she is with her 6-month-old grandson, her blood pressure seems very well-controlled.  Will increase metoprolol to 50 mg p.o. twice daily as above.  May consider adding nifedipine if we need additional blood pressure control in the future.  Amlodipine has caused myalgias in the remote past  Continue rosuvastatin for dyslipidemia 5/25/2025 revealing improved LDL cholesterol at 97 mg/dL  Recent blood work reviewed from 7/18/2025, stable hemoglobin, renal function, potassium        Follow-up in 4 months        Interval History:     This is a 69-year-old female hospitalized 5/2025 with new onset atrial fibrillation/flutter with rapid ventricular response along with symptoms of chest discomfort and diaphoresis.  High sensitive troponin was significantly elevated.  She spontaneously went back to sinus rhythm.  Cardiac catheterization on 5/27/2025 demonstrated no epicardial obstructive CAD.  LVEDP was normal.  She was maintained on metoprolol and Eliquis for A-fib.  She remained on statin therapy for dyslipidemia and amlodipine for hypertension.  Echocardiogram 5/26/2025 had revealed left ejection fraction 75% with dynamic LVOT obstruction  "with peak gradient 35 mmHg at rest which did not increase with Valsalva.  There was mild biatrial dilatation with moderate systolic anterior motion of the mitral valve with septal contact.  She subsequently underwent outpatient Zio on 5/28/2025 which revealed sinus rhythm and no evidence of recurrent atrial fibrillation or flutter.    During her subsequent visit on 6/26/2025, losartan/HCTZ was initiated in place of lisinopril for better blood pressure control.    She presents today for follow-up.  Labs on 7/18/2025 demonstrated stable renal function and potassium levels.  Sodium was minimally decreased at 134.  CBC showed stable hemoglobin.  She has no complaints at today's visit.  She states her blood pressure has been somewhat labile at home with readings as low as 110 and has a high as 150s.                       Vitals:  Vitals:    07/23/25 1300   BP: (!) 174/60   BP Location: Left arm   Patient Position: Sitting   Cuff Size: Standard   Pulse: 68   Weight: 57.6 kg (127 lb)   Height: 5' 2\" (1.575 m)         Past Medical History[1]  Social History     Socioeconomic History   • Marital status: /Civil Union     Spouse name: Not on file   • Number of children: 2   • Years of education: Not on file   • Highest education level: Not on file   Occupational History   • Not on file   Tobacco Use   • Smoking status: Every Day     Current packs/day: 1.00     Types: Cigarettes   • Smokeless tobacco: Never   • Tobacco comments:     TOBACCO USE   Vaping Use   • Vaping status: Never Used   Substance and Sexual Activity   • Alcohol use: Not Currently     Comment: BEING A SOCIAL DRINKER   • Drug use: No   • Sexual activity: Yes   Other Topics Concern   • Not on file   Social History Narrative    ALWAYS USES SEAT BELT    CAFFEINE USE    Scientologist     Social Drivers of Health     Financial Resource Strain: Medium Risk (3/6/2023)    Overall Financial Resource Strain (CARDIA)    • Difficulty of Paying Living Expenses: " Somewhat hard   Food Insecurity: No Food Insecurity (5/24/2025)    Nursing - Inadequate Food Risk Classification    • Worried About Running Out of Food in the Last Year: Never true    • Ran Out of Food in the Last Year: Never true    • Ran Out of Food in the Last Year: Never true   Transportation Needs: No Transportation Needs (5/24/2025)    Nursing - Transportation Risk Classification    • Lack of Transportation: Not on file    • Lack of Transportation: No   Physical Activity: Not on file   Stress: Not on file   Social Connections: Not on file   Intimate Partner Violence: Unknown (5/24/2025)    Nursing IPS    • Feels Physically and Emotionally Safe: Not on file    • Physically Hurt by Someone: Not on file    • Humiliated or Emotionally Abused by Someone: Not on file    • Physically Hurt by Someone: No    • Hurt or Threatened by Someone: No   Housing Stability: Unknown (5/24/2025)    Nursing: Inadequate Housing Risk Classification    • Has Housing: Not on file    • Worried About Losing Housing: Not on file    • Unable to Get Utilities: Not on file    • Unable to Pay for Housing in the Last Year: No    • Has Housing: No      Family History[1]  Past Surgical History[1]  Current Medications[1]        Review of Systems:  Review of Systems   Constitutional:  Negative for activity change, fever and unexpected weight change.   HENT:  Negative for facial swelling, nosebleeds and voice change.    Respiratory:  Negative for chest tightness, shortness of breath and wheezing.    Cardiovascular:  Negative for chest pain, palpitations and leg swelling.   Gastrointestinal:  Negative for abdominal distention.   Genitourinary:  Negative for hematuria.   Musculoskeletal:  Negative for arthralgias.   Skin:  Negative for color change, pallor, rash and wound.   Neurological:  Negative for dizziness, seizures, syncope and light-headedness.   Psychiatric/Behavioral:  Negative for agitation.          Physical Exam:  Physical Exam  Vitals  reviewed.   Constitutional:       Appearance: She is well-developed.   HENT:      Head: Normocephalic and atraumatic.     Cardiovascular:      Rate and Rhythm: Normal rate and regular rhythm.      Heart sounds: Normal heart sounds.   Pulmonary:      Effort: Pulmonary effort is normal.      Breath sounds: Normal breath sounds.   Abdominal:      Palpations: Abdomen is soft.     Musculoskeletal:         General: Normal range of motion.      Cervical back: Normal range of motion and neck supple.      Right lower leg: No edema.      Left lower leg: No edema.     Skin:     General: Skin is warm and dry.     Neurological:      Mental Status: She is alert and oriented to person, place, and time.     Psychiatric:         Behavior: Behavior normal.         Thought Content: Thought content normal.         Judgment: Judgment normal.         This note was completed in part utilizing [employer] Direct Software.  Grammatical errors, random word insertions, spelling mistakes, and incomplete sentences can be an occasional consequence of this system secondary to software limitations, ambient noise, and hardware issues.  If you have any questions or concerns about the content, text, or information contained within the body of this dictation, please contact the provider for clarification.                 [1]  Past Medical History:  Diagnosis Date   • Carotid artery stenosis    • Cyst of breast    • Dyspareunia in female    • Hyperlipidemia    • Hypertension    • Personal history of endometriosis     RESOLVED: 12/14/16   • Pre-diabetes     LAST ASSESSED: 12/14/16   [1]  Family History  Problem Relation Name Age of Onset   • Hypertension Mother          BENIGN ESSENTIAL   • Stroke Mother          March 26, 2018   • Hypertension Father          BENIGN ESSENTIAL   • Skin cancer Father     • Diverticulosis Father     • Diverticulitis Father     • Lung cancer Father     • Spina bifida Sister     • Colon cancer Maternal Grandfather     •  Hypertension Brother          BENIGN ESSENTIAL   • No Known Problems Paternal Aunt     • No Known Problems Paternal Aunt     • Hyperlipidemia Family     • Hypertension Family     • Heart attack Family     • Breast cancer Neg Hx     [1]  Past Surgical History:  Procedure Laterality Date   • CARDIAC CATHETERIZATION N/A 5/27/2025    Procedure: Cardiac Catheterization;  Surgeon: Marcel Arredondo MD;  Location: [facility];  Service: Cardiology   • COLONOSCOPY      COMPLETE; 2009-2016-2021 [facility]   • COLPOSCOPY W/ BIOPSY / CURETTAGE      COLPOSCOPY CERVIX WITH BIOPSY(S) WITH ENDOCERVICAL CURETTAGE; JANUARY 1981, JANUARY 02   • ENDOMETRIAL BIOPSY      BY SUCTION; A/24/99 WITH FOCAL CROWDING POSSIBLE SIMPLE HYPERPLASIA    • LAPAROSCOPIC TUBAL LIGATION Bilateral 11/1990 NOVEMBER 1990 WITH BILATERAL TUBAL ALONG WITH ELECTROCOAGULATION OF ENDOMETRIAL IMPLANT IN THE RIGHT UTEROSACRAL LIGAMENT AND RESECTION OF ANTERIOR ABDOMINAL WALL NEVUS    • MOUTH SURGERY     • THYROGLOSSAL DUCT EXCISION      CYST   • TONSILLECTOMY AND ADENOIDECTOMY     • TUBAL LIGATION     [1]    Current Outpatient Medications:   •  apixaban (Eliquis) 5 mg, Take 1 tablet (5 mg total) by mouth 2 (two) times a day, Disp: 180 tablet, Rfl: 5  •  cholecalciferol (VITAMIN D3) 400 units tablet, Take 400 Units by mouth daily, Disp: , Rfl:   •  EPINEPHrine (EPIPEN) 0.3 mg/0.3 mL SOAJ, Inject 0.3 mL (0.3 mg total) into a muscle once for 1 dose, Disp: 0.6 mL, Rfl: 0  •  losartan-hydrochlorothiazide (HYZAAR) 100-25 MG per tablet, Take 1 tablet by mouth daily, Disp: 90 tablet, Rfl: 3  •  metoprolol tartrate (LOPRESSOR) 50 mg tablet, Take 1 tablet (50 mg total) by mouth every 12 (twelve) hours, Disp: 180 tablet, Rfl: 3  •  rosuvastatin (CRESTOR) 10 MG tablet, Take 1 tablet (10 mg total) by mouth daily, Disp: 90 tablet, Rfl: 1  •  Calcium Carb-Cholecalciferol 600-20 MG-MCG TABS, , Disp: , Rfl:   •  nicotine (NICODERM CQ) 14 mg/24hr TD 24 hr patch, Place 1  patch on the skin every 24 hours over 24 hours (Patient not taking: Reported on 7/23/2025), Disp: 28 patch, Rfl: 0

## 2025-08-21 ENCOUNTER — VBI (OUTPATIENT)
Dept: ADMINISTRATIVE | Facility: OTHER | Age: 70
End: 2025-08-21

## (undated) DEVICE — CATH DIAG 5FR IMPULSE 100CM FL4

## (undated) DEVICE — DGW .035 FC J3MM 150CM TEF: Brand: EMERALD

## (undated) DEVICE — CATH DIAG 5FR .045 100CM FR4

## (undated) DEVICE — GLIDESHEATH BASIC HYDROPHILIC COATED INTRODUCER SHEATH: Brand: GLIDESHEATH

## (undated) DEVICE — TR BAND RADIAL ARTERY COMPRESSION DEVICE: Brand: TR BAND

## (undated) DEVICE — RADIFOCUS OPTITORQUE ANGIOGRAPHIC CATHETER: Brand: OPTITORQUE

## (undated) DEVICE — PINNACLE INTRODUCER SHEATH: Brand: PINNACLE

## (undated) DEVICE — GUIDEWIRE WHOLEY HI TORQUE INTERM MOD J .035 145CM

## (undated) DEVICE — DGW .035 FC J3MM 260CM TEF: Brand: EMERALD